# Patient Record
Sex: FEMALE | Race: WHITE | NOT HISPANIC OR LATINO | Employment: OTHER | URBAN - METROPOLITAN AREA
[De-identification: names, ages, dates, MRNs, and addresses within clinical notes are randomized per-mention and may not be internally consistent; named-entity substitution may affect disease eponyms.]

---

## 2017-03-22 ENCOUNTER — OFFICE VISIT (OUTPATIENT)
Dept: ORTHOPEDIC SURGERY | Facility: CLINIC | Age: 57
End: 2017-03-22

## 2017-03-22 DIAGNOSIS — M22.2X1 PATELLOFEMORAL STRESS SYNDROME OF RIGHT KNEE: ICD-10-CM

## 2017-03-22 DIAGNOSIS — M17.11 PRIMARY OSTEOARTHRITIS OF RIGHT KNEE: Primary | ICD-10-CM

## 2017-03-22 PROCEDURE — 20610 DRAIN/INJ JOINT/BURSA W/O US: CPT | Performed by: ORTHOPAEDIC SURGERY

## 2017-03-22 RX ORDER — MELOXICAM 7.5 MG/1
7.5 TABLET ORAL DAILY
Qty: 30 TABLET | Refills: 0 | Status: SHIPPED | OUTPATIENT
Start: 2017-03-22 | End: 2017-06-20 | Stop reason: SDUPTHER

## 2017-03-22 RX ADMIN — LIDOCAINE HYDROCHLORIDE 4 ML: 10 INJECTION, SOLUTION INFILTRATION; PERINEURAL at 15:26

## 2017-03-22 RX ADMIN — TRIAMCINOLONE ACETONIDE 80 MG: 40 INJECTION, SUSPENSION INTRA-ARTICULAR; INTRAMUSCULAR at 15:26

## 2017-03-22 RX ADMIN — BUPIVACAINE HYDROCHLORIDE 4 ML: 5 INJECTION, SOLUTION EPIDURAL; INTRACAUDAL at 15:26

## 2017-04-04 RX ORDER — BUPIVACAINE HYDROCHLORIDE 5 MG/ML
4 INJECTION, SOLUTION EPIDURAL; INTRACAUDAL
Status: COMPLETED | OUTPATIENT
Start: 2017-03-22 | End: 2017-03-22

## 2017-04-04 RX ORDER — LIDOCAINE HYDROCHLORIDE 10 MG/ML
4 INJECTION, SOLUTION INFILTRATION; PERINEURAL
Status: COMPLETED | OUTPATIENT
Start: 2017-03-22 | End: 2017-03-22

## 2017-04-04 RX ORDER — TRIAMCINOLONE ACETONIDE 40 MG/ML
80 INJECTION, SUSPENSION INTRA-ARTICULAR; INTRAMUSCULAR
Status: COMPLETED | OUTPATIENT
Start: 2017-03-22 | End: 2017-03-22

## 2017-04-19 ENCOUNTER — CLINICAL SUPPORT (OUTPATIENT)
Dept: ORTHOPEDIC SURGERY | Facility: CLINIC | Age: 57
End: 2017-04-19

## 2017-04-19 DIAGNOSIS — M17.11 PRIMARY OSTEOARTHRITIS OF RIGHT KNEE: Primary | ICD-10-CM

## 2017-04-19 PROCEDURE — 20610 DRAIN/INJ JOINT/BURSA W/O US: CPT | Performed by: ORTHOPAEDIC SURGERY

## 2017-04-19 NOTE — PROGRESS NOTES
Procedure   Large Joint Arthrocentesis  Date/Time: 4/19/2017 3:21 PM  Consent given by: patient  Site marked: site marked  Timeout: Immediately prior to procedure a time out was called to verify the correct patient, procedure, equipment, support staff and site/side marked as required   Supporting Documentation  Indications: pain   Procedure Details  Location: knee - R knee  Preparation: Patient was prepped and draped in the usual sterile fashion  Needle size: 22 G  Approach: superior  Medications administered: 30 mg Hyaluronan 30 MG/2ML  Patient tolerance: patient tolerated the procedure well with no immediate complications            Patient presents to clinic today for right knee viscosupplement injections.  This is the 1st injection of the series.  I explained details of injections as well as risks, benefits and alternatives with the patient today, had all questions answered, wished to proceed with injections.  I will see patient back in 1 week for repeat injection.  Patient was instructed to watch for signs or symptoms of infection including redness, swelling, warmth to the touch, or significant increased pain and to contact our office immediately if any of these issues were noted.

## 2017-04-26 ENCOUNTER — CLINICAL SUPPORT (OUTPATIENT)
Dept: ORTHOPEDIC SURGERY | Facility: CLINIC | Age: 57
End: 2017-04-26

## 2017-04-26 DIAGNOSIS — M17.11 PRIMARY OSTEOARTHRITIS OF RIGHT KNEE: Primary | ICD-10-CM

## 2017-04-26 DIAGNOSIS — M22.2X1 PATELLOFEMORAL STRESS SYNDROME OF RIGHT KNEE: ICD-10-CM

## 2017-04-26 PROCEDURE — 20610 DRAIN/INJ JOINT/BURSA W/O US: CPT | Performed by: ORTHOPAEDIC SURGERY

## 2017-04-26 NOTE — PROGRESS NOTES
Procedure   Large Joint Arthrocentesis  Date/Time: 4/26/2017 12:50 PM  Consent given by: patient  Site marked: site marked  Timeout: Immediately prior to procedure a time out was called to verify the correct patient, procedure, equipment, support staff and site/side marked as required   Supporting Documentation  Indications: pain   Procedure Details  Location: knee - R knee  Preparation: Patient was prepped and draped in the usual sterile fashion  Needle size: 22 G  Approach: superior  Medications administered: 30 mg Hyaluronan 30 MG/2ML  Patient tolerance: patient tolerated the procedure well with no immediate complications            Patient presents to clinic today for right knee viscosupplement injections.  This is the 2nd injection of the series.  I explained details of injections as well as risks, benefits and alternatives with the patient today, had all questions answered, wished to proceed with injections.  I will see patient back in 1 week for repeat injection.  Patient was instructed to watch for signs or symptoms of infection including redness, swelling, warmth to the touch, or significant increased pain and to contact our office immediately if any of these issues were noted.

## 2017-05-03 ENCOUNTER — CLINICAL SUPPORT (OUTPATIENT)
Dept: ORTHOPEDIC SURGERY | Facility: CLINIC | Age: 57
End: 2017-05-03

## 2017-05-03 DIAGNOSIS — M17.11 PRIMARY OSTEOARTHRITIS OF RIGHT KNEE: Primary | ICD-10-CM

## 2017-05-03 PROCEDURE — 20610 DRAIN/INJ JOINT/BURSA W/O US: CPT | Performed by: ORTHOPAEDIC SURGERY

## 2017-05-30 ENCOUNTER — APPOINTMENT (OUTPATIENT)
Dept: WOMENS IMAGING | Facility: HOSPITAL | Age: 57
End: 2017-05-30

## 2017-05-30 PROCEDURE — 77067 SCR MAMMO BI INCL CAD: CPT | Performed by: RADIOLOGY

## 2017-05-30 PROCEDURE — 77063 BREAST TOMOSYNTHESIS BI: CPT | Performed by: RADIOLOGY

## 2017-06-20 ENCOUNTER — OFFICE VISIT (OUTPATIENT)
Dept: ORTHOPEDIC SURGERY | Facility: CLINIC | Age: 57
End: 2017-06-20

## 2017-06-20 DIAGNOSIS — M17.11 PRIMARY OSTEOARTHRITIS OF RIGHT KNEE: Primary | ICD-10-CM

## 2017-06-20 PROCEDURE — 20610 DRAIN/INJ JOINT/BURSA W/O US: CPT | Performed by: ORTHOPAEDIC SURGERY

## 2017-06-20 RX ORDER — MELOXICAM 7.5 MG/1
7.5 TABLET ORAL DAILY
Qty: 30 TABLET | Refills: 0 | Status: SHIPPED | OUTPATIENT
Start: 2017-06-20 | End: 2018-03-29

## 2017-06-20 RX ADMIN — TRIAMCINOLONE ACETONIDE 80 MG: 40 INJECTION, SUSPENSION INTRA-ARTICULAR; INTRAMUSCULAR at 15:12

## 2017-06-20 RX ADMIN — LIDOCAINE HYDROCHLORIDE 4 ML: 10 INJECTION, SOLUTION EPIDURAL; INFILTRATION; INTRACAUDAL; PERINEURAL at 15:12

## 2017-06-20 RX ADMIN — BUPIVACAINE HYDROCHLORIDE 4 ML: 5 INJECTION, SOLUTION EPIDURAL; INTRACAUDAL at 15:12

## 2017-07-09 RX ORDER — TRIAMCINOLONE ACETONIDE 40 MG/ML
80 INJECTION, SUSPENSION INTRA-ARTICULAR; INTRAMUSCULAR
Status: COMPLETED | OUTPATIENT
Start: 2017-06-20 | End: 2017-06-20

## 2017-07-09 RX ORDER — LIDOCAINE HYDROCHLORIDE 10 MG/ML
4 INJECTION, SOLUTION EPIDURAL; INFILTRATION; INTRACAUDAL; PERINEURAL
Status: COMPLETED | OUTPATIENT
Start: 2017-06-20 | End: 2017-06-20

## 2017-07-09 RX ORDER — BUPIVACAINE HYDROCHLORIDE 5 MG/ML
4 INJECTION, SOLUTION EPIDURAL; INTRACAUDAL
Status: COMPLETED | OUTPATIENT
Start: 2017-06-20 | End: 2017-06-20

## 2017-08-29 ENCOUNTER — OFFICE VISIT (OUTPATIENT)
Dept: ORTHOPEDIC SURGERY | Facility: CLINIC | Age: 57
End: 2017-08-29

## 2017-08-29 DIAGNOSIS — M17.11 PRIMARY OSTEOARTHRITIS OF RIGHT KNEE: Primary | ICD-10-CM

## 2017-08-29 PROCEDURE — 20610 DRAIN/INJ JOINT/BURSA W/O US: CPT | Performed by: ORTHOPAEDIC SURGERY

## 2017-08-29 PROCEDURE — 99213 OFFICE O/P EST LOW 20 MIN: CPT | Performed by: ORTHOPAEDIC SURGERY

## 2017-08-29 RX ADMIN — TRIAMCINOLONE ACETONIDE 80 MG: 40 INJECTION, SUSPENSION INTRA-ARTICULAR; INTRAMUSCULAR at 12:06

## 2017-08-29 RX ADMIN — LIDOCAINE HYDROCHLORIDE 4 ML: 10 INJECTION, SOLUTION EPIDURAL; INFILTRATION; INTRACAUDAL; PERINEURAL at 12:06

## 2017-08-29 RX ADMIN — BUPIVACAINE HYDROCHLORIDE 4 ML: 5 INJECTION, SOLUTION EPIDURAL; INTRACAUDAL at 12:06

## 2017-09-07 RX ORDER — BUPIVACAINE HYDROCHLORIDE 5 MG/ML
4 INJECTION, SOLUTION EPIDURAL; INTRACAUDAL
Status: COMPLETED | OUTPATIENT
Start: 2017-08-29 | End: 2017-08-29

## 2017-09-07 RX ORDER — LIDOCAINE HYDROCHLORIDE 10 MG/ML
4 INJECTION, SOLUTION EPIDURAL; INFILTRATION; INTRACAUDAL; PERINEURAL
Status: COMPLETED | OUTPATIENT
Start: 2017-08-29 | End: 2017-08-29

## 2017-09-07 RX ORDER — TRIAMCINOLONE ACETONIDE 40 MG/ML
80 INJECTION, SUSPENSION INTRA-ARTICULAR; INTRAMUSCULAR
Status: COMPLETED | OUTPATIENT
Start: 2017-08-29 | End: 2017-08-29

## 2017-09-22 ENCOUNTER — TELEPHONE (OUTPATIENT)
Dept: GASTROENTEROLOGY | Facility: CLINIC | Age: 57
End: 2017-09-22

## 2017-09-22 RX ORDER — ONDANSETRON 4 MG/1
4 TABLET, ORALLY DISINTEGRATING ORAL EVERY 8 HOURS PRN
Qty: 25 TABLET | Refills: 3 | Status: SHIPPED | OUTPATIENT
Start: 2017-09-22 | End: 2019-01-03

## 2017-09-22 NOTE — TELEPHONE ENCOUNTER
"Called pt and pt states she has been doing well ,but does use the zofran 2-3x per year.  She is asking if she can have a refill of it to have on hand \"in case her stomach acts up\".  Advised would send message to Dr Arias.  Pt verb understanding.   "

## 2017-09-22 NOTE — TELEPHONE ENCOUNTER
----- Message from Trish Antonio sent at 2017  8:29 AM EDT -----  Regarding: PT CALLED - REFILL ON ZOFRAN   Contact: [phone]  PT REQUESTING REFILL.

## 2018-01-16 ENCOUNTER — OFFICE VISIT (OUTPATIENT)
Dept: ORTHOPEDIC SURGERY | Facility: CLINIC | Age: 58
End: 2018-01-16

## 2018-01-16 VITALS — HEIGHT: 67 IN | WEIGHT: 130 LBS | BODY MASS INDEX: 20.4 KG/M2

## 2018-01-16 DIAGNOSIS — M17.11 PRIMARY OSTEOARTHRITIS OF RIGHT KNEE: ICD-10-CM

## 2018-01-16 DIAGNOSIS — R52 PAIN: Primary | ICD-10-CM

## 2018-01-16 PROCEDURE — 20610 DRAIN/INJ JOINT/BURSA W/O US: CPT | Performed by: ORTHOPAEDIC SURGERY

## 2018-01-16 PROCEDURE — 99213 OFFICE O/P EST LOW 20 MIN: CPT | Performed by: ORTHOPAEDIC SURGERY

## 2018-01-16 PROCEDURE — 73562 X-RAY EXAM OF KNEE 3: CPT | Performed by: ORTHOPAEDIC SURGERY

## 2018-01-16 RX ADMIN — BUPIVACAINE HYDROCHLORIDE 4 ML: 5 INJECTION, SOLUTION EPIDURAL; INTRACAUDAL at 15:54

## 2018-01-16 RX ADMIN — LIDOCAINE HYDROCHLORIDE 4 ML: 10 INJECTION, SOLUTION EPIDURAL; INFILTRATION; INTRACAUDAL; PERINEURAL at 15:54

## 2018-01-16 RX ADMIN — TRIAMCINOLONE ACETONIDE 80 MG: 40 INJECTION, SUSPENSION INTRA-ARTICULAR; INTRAMUSCULAR at 15:54

## 2018-01-16 NOTE — PROGRESS NOTES
Subjective:     Patient ID: Zoila Delgado is a 57 y.o. female.    Chief Complaint:  Follow-up right knee pain, DJD  History of Present Illness  Zoila Delgado returns to clinic today for evaluation of right knee, knee had done very well for approximately 2-1/2-3 months, but over the last 2 weeks or so she's had increasing pain as well as significant swelling to the right knee that limits her flexion and mobility.  Pain localized primarily to the anterior and medial aspect of the right knee, denies any associated groin pain, denies radiation of pain.  Rates pain currently as a 6-7 out of 10 in aching in nature with occasional sharp pain noted.  Denies any edil locking of the knee.     Social History     Occupational History   • Not on file.     Social History Main Topics   • Smoking status: Never Smoker   • Smokeless tobacco: Never Used   • Alcohol use No   • Drug use: No   • Sexual activity: Defer      Past Medical History:   Diagnosis Date   • Ankle sprain    • Cervical disc disorder    • Diverticular disease    • Diverticulitis    • Dyspepsia    • Gastritis    • GERD (gastroesophageal reflux disease)    • HH (hiatus hernia)    • Hyperlipidemia    • Renal cyst     left   • Rotator cuff syndrome      Past Surgical History:   Procedure Laterality Date   • COLONOSCOPY  11/30/2015    tics,nibh   • ENDOMETRIAL ABLATION     • ENDOSCOPY  11/30/2015    HH, Z-line irregular, 42 cm. fromincisors, chronic inflammation   • TONSILECTOMY, ADENOIDECTOMY, BILATERAL MYRINGOTOMY AND TUBES         No family history on file.      Review of Systems   Constitutional: Negative for chills, diaphoresis, fever and unexpected weight change.   HENT: Negative for hearing loss, nosebleeds, sore throat and tinnitus.    Eyes: Negative for pain and visual disturbance.   Respiratory: Negative for cough, shortness of breath and wheezing.    Cardiovascular: Negative for chest pain and palpitations.   Gastrointestinal: Negative for abdominal pain,  "diarrhea, nausea and vomiting.   Endocrine: Negative for cold intolerance, heat intolerance and polydipsia.   Genitourinary: Negative for difficulty urinating, dysuria and hematuria.   Musculoskeletal: Positive for arthralgias and myalgias. Negative for joint swelling.        Rt knee pain   Skin: Negative for rash and wound.   Allergic/Immunologic: Negative for environmental allergies.   Neurological: Negative for dizziness, syncope and numbness.   Hematological: Does not bruise/bleed easily.   Psychiatric/Behavioral: Negative for dysphoric mood and sleep disturbance. The patient is not nervous/anxious.    All other systems reviewed and are negative.          Objective:  Vitals:    01/16/18 1517   Weight: 59 kg (130 lb)   Height: 170.2 cm (67\")     Last 2 weights    01/16/18  1517   Weight: 59 kg (130 lb)     Body mass index is 20.36 kg/(m^2).  General: No acute distress.  Resp: normal respiratory effort  Skin: no rashes or wounds; normal turgor  Psych: mood and affect appropriate; recent and remote memory intact         Ortho Exam    Right knee-1+ effusion noted, active range of motion 2-135°, 4+ out of 5 strength on flexion and extension, maximal tenderness palpation along medial joint line as well as medial and lateral patellar facet with positive active patellar compression test.  Mildly positive Lizz exam with pain along medial joint line, no click.  Stable to varus and valgus stress at 2 and 30°.  No right hip pain on logroll or Stinchfield exam.    Imaging:  Right Knee X-Ray  Indication: Pain    AP, Lateral, and [place] views    Findings:  No fracture  No bony lesion  Normal soft tissues  Moderate medial and patellofemoral compartment joint space narrowing with no evidence radiopaque intra-articular loose body    Compared to prior office x-rays.    Assessment:       1. Pain    2. Primary osteoarthritis of right knee          Plan:  Large Joint Arthrocentesis  Date/Time: 1/16/2018 3:54 PM  Consent given " by: patient  Site marked: site marked  Timeout: Immediately prior to procedure a time out was called to verify the correct patient, procedure, equipment, support staff and site/side marked as required   Supporting Documentation  Indications: pain   Procedure Details  Location: knee - R knee  Preparation: Patient was prepped and draped in the usual sterile fashion  Needle size: 22 G  Approach: superior  Medications administered: 4 mL lidocaine PF 1% 1 %; 4 mL bupivacaine (PF) 0.5 %; 80 mg triamcinolone acetonide 40 MG/ML  Patient tolerance: patient tolerated the procedure well with no immediate complications          RICHMOND query complete.          Discussed treatment options at length with patient at today's visit. Patient overall has done fairly well with activity modification, continue work on hip and core strengthening, and previous injections.  She does have significant recurrence swelling as well as pain at this point in time.  After reviewing options she elected today to proceed with intra-articular injection.    Zoila Delgado was in agreement with plan and had all questions answered.     Orders:  Orders Placed This Encounter   Procedures   • Large Joint Arthrocentesis   • XR Knee 3 View Right       Medications:  No orders of the defined types were placed in this encounter.      Followup:  No Follow-up on file.    Zoila was seen today for follow-up.    Diagnoses and all orders for this visit:    Pain  -     XR Knee 3 View Right    Primary osteoarthritis of right knee    Other orders  -     Large Joint Arthrocentesis        Dictated utilizing Dragon dictation

## 2018-01-25 RX ORDER — LIDOCAINE HYDROCHLORIDE 10 MG/ML
4 INJECTION, SOLUTION EPIDURAL; INFILTRATION; INTRACAUDAL; PERINEURAL
Status: COMPLETED | OUTPATIENT
Start: 2018-01-16 | End: 2018-01-16

## 2018-01-25 RX ORDER — BUPIVACAINE HYDROCHLORIDE 5 MG/ML
4 INJECTION, SOLUTION EPIDURAL; INTRACAUDAL
Status: COMPLETED | OUTPATIENT
Start: 2018-01-16 | End: 2018-01-16

## 2018-01-25 RX ORDER — TRIAMCINOLONE ACETONIDE 40 MG/ML
80 INJECTION, SUSPENSION INTRA-ARTICULAR; INTRAMUSCULAR
Status: COMPLETED | OUTPATIENT
Start: 2018-01-16 | End: 2018-01-16

## 2018-03-29 ENCOUNTER — OFFICE VISIT (OUTPATIENT)
Dept: ORTHOPEDIC SURGERY | Facility: CLINIC | Age: 58
End: 2018-03-29

## 2018-03-29 DIAGNOSIS — M17.11 PRIMARY OSTEOARTHRITIS OF RIGHT KNEE: Primary | ICD-10-CM

## 2018-03-29 PROCEDURE — 99213 OFFICE O/P EST LOW 20 MIN: CPT | Performed by: ORTHOPAEDIC SURGERY

## 2018-03-29 PROCEDURE — 20610 DRAIN/INJ JOINT/BURSA W/O US: CPT | Performed by: ORTHOPAEDIC SURGERY

## 2018-03-29 RX ADMIN — LIDOCAINE HYDROCHLORIDE 8 ML: 10 INJECTION, SOLUTION EPIDURAL; INFILTRATION; INTRACAUDAL; PERINEURAL at 15:59

## 2018-03-29 RX ADMIN — TRIAMCINOLONE ACETONIDE 80 MG: 40 INJECTION, SUSPENSION INTRA-ARTICULAR; INTRAMUSCULAR at 15:59

## 2018-04-16 RX ORDER — TRIAMCINOLONE ACETONIDE 40 MG/ML
80 INJECTION, SUSPENSION INTRA-ARTICULAR; INTRAMUSCULAR
Status: COMPLETED | OUTPATIENT
Start: 2018-03-29 | End: 2018-03-29

## 2018-04-16 RX ORDER — LIDOCAINE HYDROCHLORIDE 10 MG/ML
8 INJECTION, SOLUTION EPIDURAL; INFILTRATION; INTRACAUDAL; PERINEURAL
Status: COMPLETED | OUTPATIENT
Start: 2018-03-29 | End: 2018-03-29

## 2018-06-20 ENCOUNTER — APPOINTMENT (OUTPATIENT)
Dept: WOMENS IMAGING | Facility: HOSPITAL | Age: 58
End: 2018-06-20

## 2018-06-20 PROCEDURE — 77063 BREAST TOMOSYNTHESIS BI: CPT | Performed by: RADIOLOGY

## 2018-06-20 PROCEDURE — 77067 SCR MAMMO BI INCL CAD: CPT | Performed by: RADIOLOGY

## 2018-06-28 ENCOUNTER — OFFICE VISIT (OUTPATIENT)
Dept: ORTHOPEDIC SURGERY | Facility: CLINIC | Age: 58
End: 2018-06-28

## 2018-06-28 DIAGNOSIS — M17.11 PRIMARY OSTEOARTHRITIS OF RIGHT KNEE: Primary | ICD-10-CM

## 2018-06-28 PROCEDURE — 99213 OFFICE O/P EST LOW 20 MIN: CPT | Performed by: ORTHOPAEDIC SURGERY

## 2018-06-28 PROCEDURE — 20610 DRAIN/INJ JOINT/BURSA W/O US: CPT | Performed by: ORTHOPAEDIC SURGERY

## 2018-06-28 RX ADMIN — TRIAMCINOLONE ACETONIDE 80 MG: 40 INJECTION, SUSPENSION INTRA-ARTICULAR; INTRAMUSCULAR at 15:53

## 2018-06-28 RX ADMIN — LIDOCAINE HYDROCHLORIDE 8 ML: 10 INJECTION, SOLUTION EPIDURAL; INFILTRATION; INTRACAUDAL; PERINEURAL at 15:53

## 2018-07-02 RX ORDER — TRIAMCINOLONE ACETONIDE 40 MG/ML
80 INJECTION, SUSPENSION INTRA-ARTICULAR; INTRAMUSCULAR
Status: COMPLETED | OUTPATIENT
Start: 2018-06-28 | End: 2018-06-28

## 2018-07-02 RX ORDER — LIDOCAINE HYDROCHLORIDE 10 MG/ML
8 INJECTION, SOLUTION EPIDURAL; INFILTRATION; INTRACAUDAL; PERINEURAL
Status: COMPLETED | OUTPATIENT
Start: 2018-06-28 | End: 2018-06-28

## 2019-01-03 ENCOUNTER — OFFICE VISIT (OUTPATIENT)
Dept: ORTHOPEDIC SURGERY | Facility: CLINIC | Age: 59
End: 2019-01-03

## 2019-01-03 DIAGNOSIS — M17.11 PRIMARY OSTEOARTHRITIS OF RIGHT KNEE: Primary | ICD-10-CM

## 2019-01-03 PROCEDURE — 99213 OFFICE O/P EST LOW 20 MIN: CPT | Performed by: ORTHOPAEDIC SURGERY

## 2019-01-03 PROCEDURE — 20610 DRAIN/INJ JOINT/BURSA W/O US: CPT | Performed by: ORTHOPAEDIC SURGERY

## 2019-01-03 RX ORDER — TRIAMCINOLONE ACETONIDE 40 MG/ML
80 INJECTION, SUSPENSION INTRA-ARTICULAR; INTRAMUSCULAR
Status: COMPLETED | OUTPATIENT
Start: 2019-01-03 | End: 2019-01-03

## 2019-01-03 RX ORDER — LIDOCAINE HYDROCHLORIDE 20 MG/ML
8 INJECTION, SOLUTION EPIDURAL; INFILTRATION; INTRACAUDAL; PERINEURAL
Status: COMPLETED | OUTPATIENT
Start: 2019-01-03 | End: 2019-01-03

## 2019-01-03 RX ADMIN — TRIAMCINOLONE ACETONIDE 80 MG: 40 INJECTION, SUSPENSION INTRA-ARTICULAR; INTRAMUSCULAR at 13:42

## 2019-01-03 RX ADMIN — LIDOCAINE HYDROCHLORIDE 8 ML: 20 INJECTION, SOLUTION EPIDURAL; INFILTRATION; INTRACAUDAL; PERINEURAL at 13:42

## 2019-01-03 NOTE — PROGRESS NOTES
Subjective:     Patient ID: Zoila Delgado is a 58 y.o. female.    Chief Complaint:  Follow-up right knee pain, DJD  History of Present Illness  Zoila Delgado returns to clinic today for evaluation of right knee, states overall she did very well following her injection 6 months ago, over the last 4 weeks she has no sick gradual increase in pain over the anterior and medial aspect of her right knee with moderate tenderness along lateral joint line noted as well, denies a edil locking or catching, rates current level of pain as a 6 out of 10 in aching in nature.  Mild intermittent swelling does wax and wane, denies any radiation of pain, denies associated numbness or tingling.     Social History     Occupational History   • Not on file   Tobacco Use   • Smoking status: Never Smoker   • Smokeless tobacco: Never Used   Substance and Sexual Activity   • Alcohol use: No   • Drug use: No   • Sexual activity: Defer      Past Medical History:   Diagnosis Date   • Ankle sprain    • Cervical disc disorder    • Diverticular disease    • Diverticulitis    • Dyspepsia    • Gastritis    • GERD (gastroesophageal reflux disease)    • HH (hiatus hernia)    • Hyperlipidemia    • Renal cyst     left   • Rotator cuff syndrome      Past Surgical History:   Procedure Laterality Date   • COLONOSCOPY  11/30/2015    balbina mallory   • ENDOMETRIAL ABLATION     • ENDOSCOPY  11/30/2015    HH, Z-line irregular, 42 cm. fromincisors, chronic inflammation   • TONSILECTOMY, ADENOIDECTOMY, BILATERAL MYRINGOTOMY AND TUBES         Family History   Problem Relation Age of Onset   • No Known Problems Mother    • No Known Problems Father          Review of Systems   Constitutional: Negative for chills, diaphoresis, fever and unexpected weight change.   HENT: Negative for hearing loss, nosebleeds, sore throat and tinnitus.    Eyes: Negative for pain and visual disturbance.   Respiratory: Negative for cough, shortness of breath and wheezing.    Cardiovascular:  Negative for chest pain and palpitations.   Gastrointestinal: Negative for abdominal pain, diarrhea, nausea and vomiting.   Endocrine: Negative for cold intolerance, heat intolerance and polydipsia.   Genitourinary: Negative for difficulty urinating, dysuria and hematuria.   Musculoskeletal: Positive for arthralgias. Negative for joint swelling and myalgias.   Skin: Negative for rash and wound.   Allergic/Immunologic: Negative for environmental allergies.   Neurological: Negative for dizziness, syncope and numbness.   Hematological: Does not bruise/bleed easily.   Psychiatric/Behavioral: Negative for dysphoric mood and sleep disturbance. The patient is not nervous/anxious.            Objective:  There were no vitals filed for this visit.  There were no vitals filed for this visit.  There is no height or weight on file to calculate BMI.  General: No acute distress.  Resp: normal respiratory effort  Skin: no rashes or wounds; normal turgor  Psych: mood and affect appropriate; recent and remote memory intact          Ortho Exam     Right knee-active range of motion 0-135°, 4+ out of 5 strength on flexion and extension, mild effusion noted, maximal tenderness along medial and lateral patellar facet with moderate tenderness along lateral joint line, positive Morales exam with pain, no click, positive active patellar compression test.  Stable to varus and valgus stress at 0 and 30°.  Imaging:    Assessment:        1. Primary osteoarthritis of right knee           Plan:  Large Joint Arthrocentesis: R knee  Date/Time: 1/3/2019 1:42 PM  Consent given by: patient  Site marked: site marked  Timeout: Immediately prior to procedure a time out was called to verify the correct patient, procedure, equipment, support staff and site/side marked as required   Supporting Documentation  Indications: pain   Procedure Details  Location: knee - R knee  Preparation: Patient was prepped and draped in the usual sterile fashion  Needle size: 22  G  Approach: anterolateral  Medications administered: 8 mL lidocaine PF 2% 2 %; 80 mg triamcinolone acetonide 40 MG/ML  Patient tolerance: patient tolerated the procedure well with no immediate complications                Discussed treatment options at length with patient at today's visit.  Given significant recurrence of pain patient was to proceed with repeat injection today.    10 minutes out of 15 minutes face-to-face time was spent counseling patient extensively on exercise, opportunities for physical therapy and bracing, options in regards to underlying pathology including but not limited to surgical options including arthroplasty as well as injections.       Zoila Delgado was in agreement with plan and had all questions answered.     Orders:  Orders Placed This Encounter   Procedures   • Large Joint Arthrocentesis: R knee       Medications:  No orders of the defined types were placed in this encounter.      Followup:  Return if symptoms worsen or fail to improve.    Zoila was seen today for pain.    Diagnoses and all orders for this visit:    Primary osteoarthritis of right knee    Other orders  -     Large Joint Arthrocentesis: R knee          Dictated utilizing Dragon dictation

## 2019-02-08 ENCOUNTER — PREP FOR SURGERY (OUTPATIENT)
Dept: OTHER | Facility: HOSPITAL | Age: 59
End: 2019-02-08

## 2019-02-08 DIAGNOSIS — K22.70 BARRETT'S ESOPHAGUS WITHOUT DYSPLASIA: Primary | ICD-10-CM

## 2019-05-07 ENCOUNTER — ANESTHESIA (OUTPATIENT)
Dept: GASTROENTEROLOGY | Facility: HOSPITAL | Age: 59
End: 2019-05-07

## 2019-05-07 ENCOUNTER — ANESTHESIA EVENT (OUTPATIENT)
Dept: GASTROENTEROLOGY | Facility: HOSPITAL | Age: 59
End: 2019-05-07

## 2019-05-07 ENCOUNTER — HOSPITAL ENCOUNTER (OUTPATIENT)
Facility: HOSPITAL | Age: 59
Setting detail: HOSPITAL OUTPATIENT SURGERY
Discharge: HOME | End: 2019-05-07
Attending: INTERNAL MEDICINE | Admitting: INTERNAL MEDICINE

## 2019-05-07 VITALS
WEIGHT: 132.5 LBS | HEIGHT: 66 IN | TEMPERATURE: 98 F | DIASTOLIC BLOOD PRESSURE: 75 MMHG | RESPIRATION RATE: 16 BRPM | OXYGEN SATURATION: 98 % | BODY MASS INDEX: 21.29 KG/M2 | SYSTOLIC BLOOD PRESSURE: 105 MMHG | HEART RATE: 61 BPM

## 2019-05-07 DIAGNOSIS — K22.70 BARRETT'S ESOPHAGUS WITHOUT DYSPLASIA: ICD-10-CM

## 2019-05-07 PROCEDURE — 43239 EGD BIOPSY SINGLE/MULTIPLE: CPT | Performed by: INTERNAL MEDICINE

## 2019-05-07 PROCEDURE — 25010000002 PROPOFOL 10 MG/ML EMULSION: Performed by: ANESTHESIOLOGY

## 2019-05-07 PROCEDURE — S0260 H&P FOR SURGERY: HCPCS | Performed by: INTERNAL MEDICINE

## 2019-05-07 PROCEDURE — 88305 TISSUE EXAM BY PATHOLOGIST: CPT | Performed by: INTERNAL MEDICINE

## 2019-05-07 RX ORDER — LIDOCAINE HYDROCHLORIDE 20 MG/ML
INJECTION, SOLUTION INFILTRATION; PERINEURAL AS NEEDED
Status: DISCONTINUED | OUTPATIENT
Start: 2019-05-07 | End: 2019-05-07 | Stop reason: SURG

## 2019-05-07 RX ORDER — PROMETHAZINE HYDROCHLORIDE 25 MG/ML
12.5 INJECTION, SOLUTION INTRAMUSCULAR; INTRAVENOUS ONCE AS NEEDED
Status: DISCONTINUED | OUTPATIENT
Start: 2019-05-07 | End: 2019-05-07 | Stop reason: HOSPADM

## 2019-05-07 RX ORDER — SODIUM CHLORIDE 0.9 % (FLUSH) 0.9 %
3 SYRINGE (ML) INJECTION AS NEEDED
Status: DISCONTINUED | OUTPATIENT
Start: 2019-05-07 | End: 2019-05-07 | Stop reason: HOSPADM

## 2019-05-07 RX ORDER — PROPOFOL 10 MG/ML
VIAL (ML) INTRAVENOUS CONTINUOUS PRN
Status: DISCONTINUED | OUTPATIENT
Start: 2019-05-07 | End: 2019-05-07 | Stop reason: SURG

## 2019-05-07 RX ORDER — SODIUM CHLORIDE, SODIUM LACTATE, POTASSIUM CHLORIDE, CALCIUM CHLORIDE 600; 310; 30; 20 MG/100ML; MG/100ML; MG/100ML; MG/100ML
1000 INJECTION, SOLUTION INTRAVENOUS CONTINUOUS
Status: DISCONTINUED | OUTPATIENT
Start: 2019-05-07 | End: 2019-05-07 | Stop reason: HOSPADM

## 2019-05-07 RX ORDER — PROMETHAZINE HYDROCHLORIDE 25 MG/1
25 TABLET ORAL ONCE AS NEEDED
Status: DISCONTINUED | OUTPATIENT
Start: 2019-05-07 | End: 2019-05-07 | Stop reason: HOSPADM

## 2019-05-07 RX ORDER — PROPOFOL 10 MG/ML
VIAL (ML) INTRAVENOUS AS NEEDED
Status: DISCONTINUED | OUTPATIENT
Start: 2019-05-07 | End: 2019-05-07 | Stop reason: SURG

## 2019-05-07 RX ORDER — PROMETHAZINE HYDROCHLORIDE 25 MG/1
25 SUPPOSITORY RECTAL ONCE AS NEEDED
Status: DISCONTINUED | OUTPATIENT
Start: 2019-05-07 | End: 2019-05-07 | Stop reason: HOSPADM

## 2019-05-07 RX ORDER — ASCORBIC ACID 500 MG
500 TABLET ORAL 2 TIMES DAILY
COMMUNITY

## 2019-05-07 RX ADMIN — SODIUM CHLORIDE, POTASSIUM CHLORIDE, SODIUM LACTATE AND CALCIUM CHLORIDE 1000 ML: 600; 310; 30; 20 INJECTION, SOLUTION INTRAVENOUS at 07:41

## 2019-05-07 RX ADMIN — PROPOFOL 130 MG: 10 INJECTION, EMULSION INTRAVENOUS at 08:05

## 2019-05-07 RX ADMIN — PROPOFOL 200 MCG/KG/MIN: 10 INJECTION, EMULSION INTRAVENOUS at 08:05

## 2019-05-07 RX ADMIN — LIDOCAINE HYDROCHLORIDE 70 MG: 20 INJECTION, SOLUTION INFILTRATION; PERINEURAL at 08:04

## 2019-05-07 NOTE — ANESTHESIA POSTPROCEDURE EVALUATION
"Patient: Zoila Delgado    Procedure Summary     Date:  05/07/19 Room / Location:  [facility] ENDOSCOPY 10 /  [facility]    Anesthesia Start:  0802 Anesthesia Stop:  0814    Procedure:  ESOPHAGOGASTRODUODENOSCOPY WITH COLD BIOPSIES (N/A Esophagus) Diagnosis:       Monsivais's esophagus without dysplasia      (Monsivais's esophagus without dysplasia [K22.70])    Surgeon:  Archnaa Arias MD Provider:  Thomas Martin MD    Anesthesia Type:  MAC ASA Status:  2          Anesthesia Type: MAC  Last vitals  BP   104/71 (05/07/19 0826)   Temp   36.7 °C (98 °F) (05/07/19 0722)   Pulse   63 (05/07/19 0826)   Resp   16 (05/07/19 0826)     SpO2   99 % (05/07/19 0826)     Post Anesthesia Care and Evaluation    Patient location during evaluation: bedside  Patient participation: complete - patient participated  Level of consciousness: awake and alert  Pain management: adequate  Airway patency: patent  Anesthetic complications: No anesthetic complications  PONV Status: none  Cardiovascular status: acceptable  Respiratory status: acceptable  Hydration status: acceptable    Comments: /71 (BP Location: Left arm, Patient Position: Sitting)   Pulse 63   Temp 36.7 °C (98 °F) (Oral)   Resp 16   Ht 167.6 cm (66\")   Wt 60.1 kg (132 lb 8 oz)   SpO2 99%   BMI 21.39 kg/m²         "

## 2019-05-07 NOTE — H&P
[facility]  Pre Procedure History & Physical    Chief Complaint:   Gerd, foss's    Subjective     HPI:   58 yo with hx foss's esophagus on previous egd - last exam 2015.  gerd well controlled.    Past Medical History:   Past Medical History:   Diagnosis Date   • Ankle sprain    • Cervical disc disorder    • Diverticular disease    • Diverticulitis    • Dyspepsia    • Gastritis    • GERD (gastroesophageal reflux disease)    • HH (hiatus hernia)    • Hyperlipidemia    • Renal cyst     left   • Rotator cuff syndrome        Past Surgical History:  Past Surgical History:   Procedure Laterality Date   • COLONOSCOPY  11/30/2015    balbina mallory   • ENDOMETRIAL ABLATION     • ENDOSCOPY  11/30/2015    HH, Z-line irregular, 42 cm. fromincisors, chronic inflammation   • TONSILECTOMY, ADENOIDECTOMY, BILATERAL MYRINGOTOMY AND TUBES         Family History:  Family History   Problem Relation Age of Onset   • No Known Problems Mother    • No Known Problems Father        Social History:   reports that she has never smoked. She has never used smokeless tobacco. She reports that she does not drink alcohol or use drugs.    Medications:   Medications Prior to Admission   Medication Sig Dispense Refill Last Dose   • Calcium Carbonate (CALCIUM CHEW) 500 MG chewable tablet Chew 1 tablet every 6 (six) hours as needed.   5/6/2019 at Unknown time   • Inulin (FIBER CHOICE PO) Take  by mouth.   5/6/2019 at Unknown time   • Magnesium 100 MG tablet Take 1 tablet/day by mouth Daily.   5/6/2019 at Unknown time   • Multiple Vitamins-Calcium (VIACTIV MULTI-VITAMIN PO) Take  by mouth.   5/6/2019 at Unknown time   • Multiple Vitamins-Minerals (QC MULTI-MORENO PO) Take  by mouth.   5/6/2019 at Unknown time   • Omega-3 Fatty Acids (FISH OIL) 1000 MG capsule capsule Take  by mouth daily with breakfast.   5/6/2019 at Unknown time   • omeprazole (PriLOSEC) 20 MG capsule Take 20 mg by mouth daily.   5/6/2019 at Unknown time   •  "vitamin C (ASCORBIC ACID) 500 MG tablet Take 500 mg by mouth 2 (Two) Times a Day.   5/6/2019 at Unknown time       Allergies:  Patient has no known allergies.    ROS:    Pertinent items are noted in HPI, all other systems reviewed and negative     Objective     Blood pressure 105/74, pulse 64, temperature 98 °F (36.7 °C), temperature source Oral, resp. rate 17, height 167.6 cm (66\"), weight 60.1 kg (132 lb 8 oz), SpO2 99 %.    Physical Exam   Constitutional: Pt is oriented to person, place, and time and well-developed, well-nourished, and in no distress.   Mouth/Throat: Oropharynx is clear and moist.   Neck: Normal range of motion.   Cardiovascular: Normal rate, regular rhythm   Pulmonary/Chest: Effort normal   Abdominal: Soft. Nontender  Skin: Skin is warm and dry.   Psychiatric: Mood, memory, affect and judgment normal.     Assessment/Plan     Diagnosis:  Gerd, foss's esophagus    Anticipated Surgical Procedure:  egd with bx    The risks, benefits, and alternatives of this procedure have been discussed with the patient or the responsible party- the patient understands and agrees to proceed.                                                            "

## 2019-05-07 NOTE — ANESTHESIA PREPROCEDURE EVALUATION
Anesthesia Evaluation     Patient summary reviewed and Nursing notes reviewed   no history of anesthetic complications:  NPO Solid Status: > 8 hours  NPO Liquid Status: > 8 hours           Airway   Mallampati: II  TM distance: >3 FB  Neck ROM: full  No difficulty expected  Dental      Pulmonary    (-) sleep apnea, rhonchi, decreased breath sounds, wheezes, not a smoker  Cardiovascular   Exercise tolerance: good (4-7 METS)    Rhythm: regular  Rate: normal    (+) hyperlipidemia,   (-) hypertension, CAD, angina, SEGURA, murmur      Neuro/Psych  (-) seizures, CVA  GI/Hepatic/Renal/Endo    (+)  hiatal hernia, GERD,    (-)  obesity, no renal disease, diabetes    Musculoskeletal     Abdominal     Abdomen: soft.   Substance History      OB/GYN          Other   (+) arthritis                     Anesthesia Plan    ASA 2     MAC   total IV anesthesia  intravenous induction   Anesthetic plan, all risks, benefits, and alternatives have been provided, discussed and informed consent has been obtained with: patient.

## 2019-05-08 LAB
CYTO UR: NORMAL
LAB AP CASE REPORT: NORMAL
PATH REPORT.FINAL DX SPEC: NORMAL
PATH REPORT.GROSS SPEC: NORMAL

## 2019-05-10 ENCOUNTER — TELEPHONE (OUTPATIENT)
Dept: GASTROENTEROLOGY | Facility: CLINIC | Age: 59
End: 2019-05-10

## 2019-05-10 NOTE — TELEPHONE ENCOUNTER
Call to pt.  Advise per Dr Arias that mild chronic inflammation seen on bx.  No significant foss's change seen.  Repeat egd in 5 yrs.  Verb understanding.    EGD for 5/7/24 placed in recall.

## 2019-05-16 ENCOUNTER — CLINICAL SUPPORT (OUTPATIENT)
Dept: ORTHOPEDIC SURGERY | Facility: CLINIC | Age: 59
End: 2019-05-16

## 2019-05-16 DIAGNOSIS — M17.11 PRIMARY OSTEOARTHRITIS OF RIGHT KNEE: Primary | ICD-10-CM

## 2019-05-16 PROCEDURE — 20610 DRAIN/INJ JOINT/BURSA W/O US: CPT | Performed by: ORTHOPAEDIC SURGERY

## 2019-05-16 RX ORDER — TRIAMCINOLONE ACETONIDE 40 MG/ML
80 INJECTION, SUSPENSION INTRA-ARTICULAR; INTRAMUSCULAR
Status: COMPLETED | OUTPATIENT
Start: 2019-05-16 | End: 2019-05-16

## 2019-05-16 RX ORDER — LIDOCAINE HYDROCHLORIDE 10 MG/ML
8 INJECTION, SOLUTION EPIDURAL; INFILTRATION; INTRACAUDAL; PERINEURAL
Status: COMPLETED | OUTPATIENT
Start: 2019-05-16 | End: 2019-05-16

## 2019-05-16 RX ADMIN — LIDOCAINE HYDROCHLORIDE 8 ML: 10 INJECTION, SOLUTION EPIDURAL; INFILTRATION; INTRACAUDAL; PERINEURAL at 13:09

## 2019-05-16 RX ADMIN — TRIAMCINOLONE ACETONIDE 80 MG: 40 INJECTION, SUSPENSION INTRA-ARTICULAR; INTRAMUSCULAR at 13:09

## 2019-05-16 NOTE — PROGRESS NOTES
Large Joint Arthrocentesis: R knee  Date/Time: 5/16/2019 1:09 PM  Consent given by: patient  Site marked: site marked  Timeout: Immediately prior to procedure a time out was called to verify the correct patient, procedure, equipment, support staff and site/side marked as required   Supporting Documentation  Indications: pain   Procedure Details  Location: knee - R knee  Preparation: Patient was prepped and draped in the usual sterile fashion  Needle size: 22 G  Approach: superior  Medications administered: 8 mL lidocaine PF 1% 1 %; 80 mg triamcinolone acetonide 40 MG/ML  Patient tolerance: patient tolerated the procedure well with no immediate complications        CC: Right knee DJD    Patient presents today for intra-articular injection of the right knee.  She is tolerated these well, last was back in January.  She tolerated procedure well without difficulties.  Follow-up in 6 to 8 weeks with x-rays of right knee.  All questions answered

## 2019-06-25 ENCOUNTER — APPOINTMENT (OUTPATIENT)
Dept: WOMENS IMAGING | Facility: HOSPITAL | Age: 59
End: 2019-06-25

## 2019-06-25 PROCEDURE — 77063 BREAST TOMOSYNTHESIS BI: CPT | Performed by: RADIOLOGY

## 2019-06-25 PROCEDURE — 77067 SCR MAMMO BI INCL CAD: CPT | Performed by: RADIOLOGY

## 2019-06-27 ENCOUNTER — OFFICE VISIT (OUTPATIENT)
Dept: ORTHOPEDIC SURGERY | Facility: CLINIC | Age: 59
End: 2019-06-27

## 2019-06-27 VITALS — WEIGHT: 130 LBS | HEIGHT: 66 IN | BODY MASS INDEX: 20.89 KG/M2

## 2019-06-27 DIAGNOSIS — M17.11 PRIMARY OSTEOARTHRITIS OF RIGHT KNEE: ICD-10-CM

## 2019-06-27 DIAGNOSIS — R52 PAIN: Primary | ICD-10-CM

## 2019-06-27 DIAGNOSIS — M70.62 TROCHANTERIC BURSITIS OF LEFT HIP: ICD-10-CM

## 2019-06-27 PROCEDURE — 99214 OFFICE O/P EST MOD 30 MIN: CPT | Performed by: ORTHOPAEDIC SURGERY

## 2019-06-27 PROCEDURE — 20610 DRAIN/INJ JOINT/BURSA W/O US: CPT | Performed by: ORTHOPAEDIC SURGERY

## 2019-06-27 PROCEDURE — 73562 X-RAY EXAM OF KNEE 3: CPT | Performed by: ORTHOPAEDIC SURGERY

## 2019-06-27 RX ADMIN — TRIAMCINOLONE ACETONIDE 80 MG: 40 INJECTION, SUSPENSION INTRA-ARTICULAR; INTRAMUSCULAR at 08:59

## 2019-06-27 RX ADMIN — LIDOCAINE HYDROCHLORIDE 4 ML: 10 INJECTION, SOLUTION EPIDURAL; INFILTRATION; INTRACAUDAL; PERINEURAL at 08:59

## 2019-07-01 RX ORDER — LIDOCAINE HYDROCHLORIDE 10 MG/ML
4 INJECTION, SOLUTION EPIDURAL; INFILTRATION; INTRACAUDAL; PERINEURAL
Status: COMPLETED | OUTPATIENT
Start: 2019-06-27 | End: 2019-06-27

## 2019-07-01 RX ORDER — TRIAMCINOLONE ACETONIDE 40 MG/ML
80 INJECTION, SUSPENSION INTRA-ARTICULAR; INTRAMUSCULAR
Status: COMPLETED | OUTPATIENT
Start: 2019-06-27 | End: 2019-06-27

## 2019-07-15 ENCOUNTER — HOSPITAL ENCOUNTER (OUTPATIENT)
Dept: PHYSICAL THERAPY | Facility: HOSPITAL | Age: 59
Setting detail: THERAPIES SERIES
Discharge: HOME | End: 2019-07-15

## 2019-07-15 DIAGNOSIS — R53.1 WEAKNESS: ICD-10-CM

## 2019-07-15 DIAGNOSIS — R26.2 DIFFICULTY WALKING: ICD-10-CM

## 2019-07-15 DIAGNOSIS — M25.561 CHRONIC PAIN OF RIGHT KNEE: ICD-10-CM

## 2019-07-15 DIAGNOSIS — G89.29 CHRONIC PAIN OF RIGHT KNEE: ICD-10-CM

## 2019-07-15 DIAGNOSIS — M25.552 LEFT HIP PAIN: Primary | ICD-10-CM

## 2019-07-15 PROCEDURE — 97161 PT EVAL LOW COMPLEX 20 MIN: CPT

## 2019-07-15 PROCEDURE — 97110 THERAPEUTIC EXERCISES: CPT

## 2019-07-15 NOTE — THERAPY EVALUATION
Outpatient Physical Therapy Ortho Initial Evaluation  [facility]     Patient Name: Zoila Delgado  : 1960  MRN: [id number]  Today's Date: 7/15/2019      Visit Date: 07/15/2019    Patient Active Problem List   Diagnosis   • Patellofemoral pain syndrome   • Knee crepitus   • Chondromalacia, knee   • Gastroesophageal reflux disease without esophagitis   • Monsivais's esophagus without dysplasia   • Primary osteoarthritis of right knee   • Constipation   • Gastroparesis        Past Medical History:   Diagnosis Date   • Ankle sprain    • Cervical disc disorder    • Diverticular disease    • Diverticulitis    • Dyspepsia    • Gastritis    • GERD (gastroesophageal reflux disease)    • HH (hiatus hernia)    • Hyperlipidemia    • Renal cyst     left   • Rotator cuff syndrome         Past Surgical History:   Procedure Laterality Date   • COLONOSCOPY  2015    balbina mallory   • ENDOMETRIAL ABLATION     • ENDOSCOPY  2015    HH, Z-line irregular, 42 cm. fromincisors, chronic inflammation   • ENDOSCOPY N/A 2019    Procedure: ESOPHAGOGASTRODUODENOSCOPY WITH COLD BIOPSIES;  Surgeon: Archana Arias MD;  Location: [facility] ENDOSCOPY;  Service: Gastroenterology   • TONSILECTOMY, ADENOIDECTOMY, BILATERAL MYRINGOTOMY AND TUBES         Visit Dx:     ICD-10-CM ICD-9-CM   1. Left hip pain M25.552 719.45   2. Chronic pain of right knee M25.561 719.46    G89.29 338.29   3. Difficulty walking R26.2 719.7   4. Weakness R53.1 780.79         Patient History     Row Name 07/15/19 0800             History    Chief Complaint  Pain  -RS      Type of Pain  Hip pain;Knee pain  -RS      Date Current Problem(s) Began  05/15/19  -RS      Brief Description of Current Complaint  The pt is a 58 yo female who presents with L hip and R knee pain ( L hamstring) pain present a couple of months. She reports her hamstring and hip pain began during yoga when he was doing a runners stretch and she heard a pop then felt pain in her  hamstring. She enjoys cycling, gardening, hiking, walking, volunteering. She denies numbness or tingling in her leg, has numbness/ tingling in her feet at night. She has trouble with stairs/ hills with her L hamstring, her pain is worse with sitting.   -RS      Previous treatment for THIS PROBLEM  Injections;Massage  -RS      Patient/Caregiver Goals  Relieve pain;Return to prior level of function;Improve strength;Know what to do to help the symptoms  -RS      Hand Dominance  right-handed  -RS         Pain     Pain Location  Hip  -RS      Pain at Present  1  -RS      Pain at Best  0  -RS      Pain at Worst  8 pre injection  -RS      Is your sleep disturbed?  Yes  -RS      Difficulties at work?  retired  -RS      Difficulties with ADL's?  prolonged sitting  -RS      Difficulties with recreational activities?  unable to run, do yoga, hike hills  -RS         Daily Activities    Primary Language  English  -RS      How does patient learn best?  Reading  -RS      Pt Participated in POC and Goals  Yes  -RS         Safety    Are you being hurt, hit, or frightened by anyone at home or in your life?  No  -RS      Are you being neglected by a caregiver  No  -RS        User Key  (r) = Recorded By, (t) = Taken By, (c) = Cosigned By    Initials Name Provider Type    RS Maria Ines Carty, PT Physical Therapist          PT Ortho     Row Name 07/15/19 0800       Quarter Clearing    Quarter Clearing  Lower Quarter Clearing  -RS       DTR- Lower Quarter Clearing    Patellar tendon (L2-4)  Bilateral:;2- Normal response  -RS    Achilles tendon (S1-2)  Bilateral:;2- Normal response  -RS       Neural Tension Signs- Lower Quarter Clearing    Slump  Bilateral:;Negative  -RS       Lumbar ROM Screen- Lower Quarter Clearing    Lumbar Flexion  Impaired 75% WNL- no increased pain  -RS    Lumbar Extension  Normal  -RS    Lumbar Lateral Flexion  Normal  -RS       Special Tests/Palpation    Special Tests/Palpation  Lumbar/SI;Hip  -RS        Lumbosacral Accessory Motions    Lumbosacral Accessory Motions Tested?  Yes  -RS    PA Glide- L2  Left:;Hypomobile;Left pain  -RS    PA Glide- L3  Left:;Hypomobile;Left pain  -RS    PA Glide- L4  Left:;Hypomobile;Left pain  -RS       Lumbosacral Palpation    Lumbosacral Palpation?  Yes  -RS    Piriformis  Left:;Tender  -RS    Quadratus Lumborum  Left:;Tender  -RS    Hamstring  Left:;Tender  -RS       MMT (Manual Muscle Testing)    Rt Lower Ext  Rt Hip Extension;Rt Hip ABduction;Rt Knee Extension;Rt Knee Flexion;Rt Ankle Plantarflexion;Rt Ankle Dorsiflexion  -RS    Lt Lower Ext  Lt Hip Extension;Lt Hip ABduction;Lt Knee Extension;Lt Knee Flexion;Lt Ankle Plantarflexion;Lt Ankle Dorsiflexion  -RS       MMT Right Lower Ext    Rt Hip Extension MMT, Gross Movement  (4+/5) good plus  -RS    Rt Hip ABduction MMT, Gross Movement  (4/5) good  -RS    Rt Knee Extension MMT, Gross Movement  (5/5) normal  -RS    Rt Knee Flexion MMT, Gross Movement  (5/5) normal  -RS    Rt Ankle Plantarflexion MMT, Gross Movement  (5/5) normal  -RS    Rt Ankle Dorsiflexion MMT, Gross Movement  (5/5) normal  -RS       MMT Left Lower Ext    Lt Hip Extension MMT, Gross Movement  (4-/5) good minus  -RS    Lt Hip ABduction MMT, Gross Movement  (4-/5) good minus  -RS    Lt Knee Extension MMT, Gross Movement  (5/5) normal  -RS    Lt Knee Flexion MMT, Gross Movement  (4/5) good  -RS    Lt Ankle Plantarflexion MMT, Gross Movement  (5/5) normal  -RS    Lt Ankle Dorsiflexion MMT, Gross Movement  (5/5) normal  -RS       Sensation    Sensation WNL?  WNL  -RS      User Key  (r) = Recorded By, (t) = Taken By, (c) = Cosigned By    Initials Name Provider Type    RS Maria Ines Carty PT Physical Therapist                      Therapy Education  Education Details: Role of outpatient PT, POC, differential diagnosis, initial HEP, expectations, goals.  Given: HEP  Program: New  How Provided: Verbal, Demonstration, Written  Provided to: Patient  Level of  Understanding: Verbalized     PT OP Goals     Row Name 07/15/19 0900          PT Short Term Goals    STG Date to Achieve  07/30/19  -RS     STG 1  Patient will be independent with initial HEP.  -RS     STG 1 Progress  New  -RS     STG 2  The pt will report 50% reduction in pain during walking/ cycling for improved recreational activity performance.   -RS     STG 2 Progress  New  -RS        Long Term Goals    LTG Date to Achieve  09/08/19  -RS     LTG 1  Patient will be independent with progressive HEP for long term management of current condition.  -RS     LTG 1 Progress  New  -RS     LTG 2  Patient will score >/70/80 on the LEFS to indicate improved perceived performance with ADLs.  -RS     LTG 2 Progress  New  -RS     LTG 3  The pt will navigate a full flight of stairs reciprocally with pain no greater than 2/10 for improved community navigation.  -RS     LTG 3 Progress  New  -RS     LTG 4  The pt will demonstrate L hip and LLE strength to 5/5 for improved funcitonal strength.  -RS     LTG 4 Progress  New  -RS     LTG 5  The pt ezequiel return to premorbid gym routine with  and group fitness classes for return to [facility].  -RS     LTG 5 Progress  New  -RS        Time Calculation    PT Goal Re-Cert Due Date  10/13/19  -RS       User Key  (r) = Recorded By, (t) = Taken By, (c) = Cosigned By    Initials Name Provider Type    RS Maria Ines Carty, PT Physical Therapist          PT Assessment/Plan     Row Name 07/15/19 0900          PT Assessment    Functional Limitations  Impaired gait;Performance in leisure activities;Performance in sport activities  -RS     Impairments  Balance;Gait;Poor body mechanics;Pain;Muscle strength  -RS     Assessment Comments  Zoila Delgado is a 59 y.o. female referred to physical therapy for L hip pain/ right knee pain. She presents with a stable clinical presentation, along with no remarkable comorbidities or personal factors that may impact her progress in the plan of care. Pt presents  today with decreased L hamstring and hip strength, increased tenderness to palpation L QL, decreased pelvic girdle strength . her signs and symptoms are consistent with referring diagnosis. The previous impairments limit her ability to climb stairs/ hills, walk longer distance, perform gym routine. Pt will benefit from skilled PT to address the previous impairments and return to PLOF.  -RS     Please refer to paper survey for additional self-reported information  Yes  -RS     Rehab Potential  Good  -RS     Patient/caregiver participated in establishment of treatment plan and goals  Yes  -RS     Patient would benefit from skilled therapy intervention  Yes  -RS        PT Plan    PT Frequency  2x/week  -RS     Predicted Duration of Therapy Intervention (Therapy Eval)  8 weeks  -RS     Planned CPT's?  PT EVAL LOW COMPLEXITY: 81919;PT RE-EVAL: 49204;PT THER PROC EA 15 MIN: 07427;PT THER ACT EA 15 MIN: 35100;PT MANUAL THERAPY EA 15 MIN: 70308;PT NEUROMUSC RE-EDUCATION EA 15 MIN: 98542;PT GAIT TRAINING EA 15 MIN: 00766;PT SELF CARE/HOME MGMT/TRAIN EA 15: 44668;PT HOT OR COLD PACK TREAT MCARE;PT ELECTRICAL STIM UNATTEND: ;PT ULTRASOUND EA 15 MIN: 80128  -RS     Physical Therapy Interventions (Optional Details)  balance training;dry needling;home exercise program;lumbar stabilization;manual therapy techniques;stair training;strengthening;patient/family education  -RS     PT Plan Comments  Assess tolerance for initial HEP, progress as tolerated to include bike, eccentric hamstring strengthening, pelvic girdle strength, and single limb balance activities as tolerated.  -RS       User Key  (r) = Recorded By, (t) = Taken By, (c) = Cosigned By    Initials Name Provider Type    RS Maria Ines Carty, PT Physical Therapist            Exercises     Row Name 07/15/19 0900             Total Minutes    16570 - PT Therapeutic Exercise Minutes  15  -RS         Exercise 1    Exercise Name 1  PPT with TA  -RS      Cueing 1  Verbal  -RS       Reps 1  10  -RS      Time 1  5s  -RS         Exercise 2    Exercise Name 2  piriformis stretch  -RS      Cueing 2  Verbal  -RS      Reps 2  3  -RS      Time 2  20s  -RS         Exercise 3    Exercise Name 3  sidelying clamshell  -RS      Cueing 3  Verbal  -RS      Reps 3  20  -RS         Exercise 4    Exercise Name 4  bridge  -RS      Cueing 4  Verbal  -RS      Reps 4  10  -RS      Time 4  5s  -RS         Exercise 5    Exercise Name 5  90-90 HS stretch  -RS      Cueing 5  Verbal  -RS      Reps 5  3  -RS      Time 5  20s  -RS        User Key  (r) = Recorded By, (t) = Taken By, (c) = Cosigned By    Initials Name Provider Type    RS Maria Ines Carty, PT Physical Therapist                        Outcome Measure Options: Lower Extremity Functional Scale (LEFS), Knee Outcome Score- ADL(57/80)  Knee Outcome Score  Knee Outcome Score Comments: 57/80      Time Calculation:     Start Time: 0830  Stop Time: 0910  Time Calculation (min): 40 min     Therapy Charges for Today     Code Description Service Date Service Provider Modifiers Qty    14080404881 HC PT THER PROC EA 15 MIN 7/15/2019 Maria Ines Carty, PT GP 1    [id number]  PT EVAL LOW COMPLEXITY 2 7/15/2019 Maria Ines Carty, PT GP 1          PT G-Codes  Outcome Measure Options: Lower Extremity Functional Scale (LEFS), Knee Outcome Score- ADL(57/80)         Maria Ines Carty PT  7/15/2019

## 2019-07-24 ENCOUNTER — APPOINTMENT (OUTPATIENT)
Dept: PHYSICAL THERAPY | Facility: HOSPITAL | Age: 59
End: 2019-07-24

## 2019-07-26 ENCOUNTER — APPOINTMENT (OUTPATIENT)
Dept: PHYSICAL THERAPY | Facility: HOSPITAL | Age: 59
End: 2019-07-26

## 2019-07-31 ENCOUNTER — HOSPITAL ENCOUNTER (OUTPATIENT)
Dept: PHYSICAL THERAPY | Facility: HOSPITAL | Age: 59
Setting detail: THERAPIES SERIES
Discharge: HOME | End: 2019-07-31

## 2019-07-31 DIAGNOSIS — G89.29 CHRONIC PAIN OF RIGHT KNEE: ICD-10-CM

## 2019-07-31 DIAGNOSIS — R53.1 WEAKNESS: ICD-10-CM

## 2019-07-31 DIAGNOSIS — M25.561 CHRONIC PAIN OF RIGHT KNEE: ICD-10-CM

## 2019-07-31 DIAGNOSIS — R26.2 DIFFICULTY WALKING: ICD-10-CM

## 2019-07-31 DIAGNOSIS — M25.552 LEFT HIP PAIN: Primary | ICD-10-CM

## 2019-07-31 PROCEDURE — 97110 THERAPEUTIC EXERCISES: CPT

## 2019-08-02 ENCOUNTER — HOSPITAL ENCOUNTER (OUTPATIENT)
Dept: PHYSICAL THERAPY | Facility: HOSPITAL | Age: 59
Setting detail: THERAPIES SERIES
Discharge: HOME | End: 2019-08-02

## 2019-08-02 DIAGNOSIS — R26.2 DIFFICULTY WALKING: ICD-10-CM

## 2019-08-02 DIAGNOSIS — M25.552 LEFT HIP PAIN: Primary | ICD-10-CM

## 2019-08-02 DIAGNOSIS — R53.1 WEAKNESS: ICD-10-CM

## 2019-08-02 DIAGNOSIS — G89.29 CHRONIC PAIN OF RIGHT KNEE: ICD-10-CM

## 2019-08-02 DIAGNOSIS — M25.561 CHRONIC PAIN OF RIGHT KNEE: ICD-10-CM

## 2019-08-02 PROCEDURE — 97110 THERAPEUTIC EXERCISES: CPT | Performed by: PHYSICAL THERAPIST

## 2019-08-02 PROCEDURE — 97140 MANUAL THERAPY 1/> REGIONS: CPT | Performed by: PHYSICAL THERAPIST

## 2019-08-02 NOTE — THERAPY TREATMENT NOTE
Outpatient Physical Therapy Ortho Treatment Note  [facility]     Patient Name: Zoila Delgado  : 1960  MRN: [id number]  Today's Date: 2019      Visit Date: 2019    Visit Dx:    ICD-10-CM ICD-9-CM   1. Left hip pain M25.552 719.45   2. Chronic pain of right knee M25.561 719.46    G89.29 338.29   3. Difficulty walking R26.2 719.7   4. Weakness R53.1 780.79       Patient Active Problem List   Diagnosis   • Patellofemoral pain syndrome   • Knee crepitus   • Chondromalacia, knee   • Gastroesophageal reflux disease without esophagitis   • Monsivais's esophagus without dysplasia   • Primary osteoarthritis of right knee   • Constipation   • Gastroparesis        Past Medical History:   Diagnosis Date   • Ankle sprain    • Cervical disc disorder    • Diverticular disease    • Diverticulitis    • Dyspepsia    • Gastritis    • GERD (gastroesophageal reflux disease)    • HH (hiatus hernia)    • Hyperlipidemia    • Renal cyst     left   • Rotator cuff syndrome         Past Surgical History:   Procedure Laterality Date   • COLONOSCOPY  2015    balbina mallory   • ENDOMETRIAL ABLATION     • ENDOSCOPY  2015    HH, Z-line irregular, 42 cm. fromincisors, chronic inflammation   • ENDOSCOPY N/A 2019    Procedure: ESOPHAGOGASTRODUODENOSCOPY WITH COLD BIOPSIES;  Surgeon: Archana Arias MD;  Location: [facility];  Service: Gastroenterology   • TONSILECTOMY, ADENOIDECTOMY, BILATERAL MYRINGOTOMY AND TUBES                         PT Assessment/Plan     Row Name 19 1052          PT Assessment    Assessment Comments  Ms. Delgado returns today reporting no increase in pain following previous session.  She complains of pain with sitting.  In lieu of strengthening activities we worked on soft tissue work in her L glut/piriformis/proximal HS tissues which demonstrated moderate tautness/trigger points, which resolved somewhat post manual. She continues to be a good candidate for skilled physical  therapy.  She is going out of town and we will assess her condition and likely progress strengthening at that time.   -GJ        PT Plan    PT Plan Comments  Assess response to manual, progress HS strengthening and hip girdle strengthening as able.  consider SL RDL, manual resisted HS conc/ecc in supine with incresaed hip flex, 4 wahy hip with TB  -GJ       User Key  (r) = Recorded By, (t) = Taken By, (c) = Cosigned By    Initials Name Provider Type    Tr Shetty, PT Physical Therapist            Exercises     Row Name 08/02/19 0800 08/02/19 0750 08/02/19 0700       Subjective Comments    Subjective Comments  sitting is still uncomfortable.  I had no issues following last session.   -GJ  --  --       Subjective Pain    Pre-Treatment Pain Level  0  -GJ  --  --       Total Minutes    24884 - PT Therapeutic Exercise Minutes  --  12  -GJ  --    52090 - PT Manual Therapy Minutes  --  30  -GJ  --       Exercise 1    Exercise Name 1  --  --  recumbent bike  -GJ    Time 1  --  --  5 min  -GJ       Exercise 2    Exercise Name 2  --  --  piriformis stretch  -GJ    Cueing 2  --  --  Verbal  -GJ    Reps 2  --  --  3, each  -GJ    Time 2  --  --  20s  -GJ    Additional Comments  --  --  push/pull  -GJ       Exercise 5    Exercise Name 5  --  --  90-90 HS stretch  -GJ    Cueing 5  --  --  Verbal  -GJ    Reps 5  --  --  3  -GJ    Time 5  --  --  20s  -GJ      User Key  (r) = Recorded By, (t) = Taken By, (c) = Cosigned By    Initials Name Provider Type    Tr Shetty, PT Physical Therapist                      Manual Rx (last 36 hours)      Manual Treatments     Row Name 08/02/19 0750 08/02/19 0700          Total Minutes    49232 - PT Manual Therapy Minutes  30  -GJ  --        Manual Rx 1    Manual Rx 1 Location  --  STM L glut tissues, piriformis, proximal HS tissues,   -GJ     Manual Rx 1 Type  --  pt R SL with pillow between knees  -GJ       User Key  (r) = Recorded By, (t) = Taken By, (c) = Cosigned By     Initials Name Provider Type    Tr Shetty, PT Physical Therapist          PT OP Goals     Row Name 08/02/19 0700          PT Short Term Goals    STG Date to Achieve  07/30/19  -GJ     STG 1  Patient will be independent with initial HEP.  -GJ     STG 1 Progress  Met  -GJ     STG 2  The pt will report 50% reduction in pain during walking/ cycling for improved recreational activity performance.   -GJ     STG 2 Progress  Met  -GJ        Long Term Goals    LTG Date to Achieve  09/08/19  -GJ     LTG 1  Patient will be independent with progressive HEP for long term management of current condition.  -GJ     LTG 1 Progress  Ongoing  -GJ     LTG 2  Patient will score >/70/80 on the LEFS to indicate improved perceived performance with ADLs.  -GJ     LTG 2 Progress  Ongoing  -GJ     LTG 3  The pt will navigate a full flight of stairs reciprocally with pain no greater than 2/10 for improved community navigation.  -GJ     LTG 3 Progress  Ongoing  -GJ     LTG 4  The pt will demonstrate L hip and LLE strength to 5/5 for improved funcitonal strength.  -GJ     LTG 4 Progress  Ongoing  -GJ     LTG 5  The pt ezequiel return to premorbid gym routine with  and group fitness classes for return to [facility].  -GJ     LTG 5 Progress  Ongoing  -GJ       User Key  (r) = Recorded By, (t) = Taken By, (c) = Cosigned By    Initials Name Provider Type    Tr Shetty, PT Physical Therapist          Therapy Education  Education Details: educated in self trigger point release with tennis balls for her trip  Given: HEP, Symptoms/condition management, Pain management, Mobility training, Posture/body mechanics  Program: New, Reinforced, Progressed  How Provided: Verbal, Demonstration  Provided to: Patient  Level of Understanding: Teach back education performed, Verbalized, Demonstrated              Time Calculation:   Start Time: 0746  Stop Time: 0830  Time Calculation (min): 44 min  Therapy Charges for Today     Code Description Service  Date Service Provider Modifiers Qty    [id number]  PT THER PROC EA 15 MIN 8/2/2019 Tr Kahn, PT GP 1    [id number]  PT MANUAL THERAPY EA 15 MIN 8/2/2019 Tr Kahn, PT GP 2                    Tr Kahn, PT  8/2/2019

## 2019-08-07 ENCOUNTER — APPOINTMENT (OUTPATIENT)
Dept: PHYSICAL THERAPY | Facility: HOSPITAL | Age: 59
End: 2019-08-07

## 2019-08-09 ENCOUNTER — APPOINTMENT (OUTPATIENT)
Dept: PHYSICAL THERAPY | Facility: HOSPITAL | Age: 59
End: 2019-08-09

## 2019-08-14 ENCOUNTER — APPOINTMENT (OUTPATIENT)
Dept: PHYSICAL THERAPY | Facility: HOSPITAL | Age: 59
End: 2019-08-14

## 2019-08-16 ENCOUNTER — HOSPITAL ENCOUNTER (OUTPATIENT)
Dept: PHYSICAL THERAPY | Facility: HOSPITAL | Age: 59
Setting detail: THERAPIES SERIES
Discharge: HOME | End: 2019-08-16

## 2019-08-16 DIAGNOSIS — M25.552 LEFT HIP PAIN: Primary | ICD-10-CM

## 2019-08-16 PROCEDURE — 97110 THERAPEUTIC EXERCISES: CPT | Performed by: PHYSICAL THERAPIST

## 2019-08-16 NOTE — THERAPY TREATMENT NOTE
Outpatient Physical Therapy Ortho Treatment Note  [facility]     Patient Name: Zoila Delgado  : 1960  MRN: [id number]  Today's Date: 2019      Visit Date: 2019    Visit Dx:    ICD-10-CM ICD-9-CM   1. Left hip pain M25.552 719.45       Patient Active Problem List   Diagnosis   • Patellofemoral pain syndrome   • Knee crepitus   • Chondromalacia, knee   • Gastroesophageal reflux disease without esophagitis   • Monsivais's esophagus without dysplasia   • Primary osteoarthritis of right knee   • Constipation   • Gastroparesis        Past Medical History:   Diagnosis Date   • Ankle sprain    • Cervical disc disorder    • Diverticular disease    • Diverticulitis    • Dyspepsia    • Gastritis    • GERD (gastroesophageal reflux disease)    • HH (hiatus hernia)    • Hyperlipidemia    • Renal cyst     left   • Rotator cuff syndrome         Past Surgical History:   Procedure Laterality Date   • COLONOSCOPY  2015    mohamud,nibh   • ENDOMETRIAL ABLATION     • ENDOSCOPY  2015    HH, Z-line irregular, 42 cm. fromincisors, chronic inflammation   • ENDOSCOPY N/A 2019    Procedure: ESOPHAGOGASTRODUODENOSCOPY WITH COLD BIOPSIES;  Surgeon: Archana Arias MD;  Location: [facility] ENDOSCOPY;  Service: Gastroenterology   • TONSILECTOMY, ADENOIDECTOMY, BILATERAL MYRINGOTOMY AND TUBES                         PT Assessment/Plan     Row Name 19 1024 19 1020       PT Assessment    Assessment Comments  Ms. Delgado wished to proceed with trial of DDN.  We needled her L glut medius/piriformis tissue with several moderate LTR's.  NO immediate adverse response.   -GJ  Ms. Delgado returns following her trip out [place].  OVeall she reports improvement of her L HS injury, just slow.  Most aggravating wtih prolonged sitting and with start up of hiking/stairs.  We progressed her strengthening program slightly today.  She reported cramping in her L gastroc with HS isometrics and L hip adductors with gastroc  stretch.  Overall, Ms. Delgado is progressing toward functional goals.  I believe she is likely over doing daily activities that may slow her progress.   -GJ       PT Plan    PT Plan Comments  assess repsonse to DDN of L glut med/piriformis tissue  -GJ  assess reponse to resuming strengthening activities.  Progress program with 4 way hip, manual resisted HS con/ecc in supine  -GJ      User Key  (r) = Recorded By, (t) = Taken By, (c) = Cosigned By    Initials Name Provider Type    Tr Shetty, PT Physical Therapist            Exercises     Row Name 08/16/19 0744 08/16/19 0700          Subjective Comments    Subjective Comments  --  i had pain with prolonged sitting, i do think the massage helped.  I would have pain with starting hiking.  I 'm getting bbetter, just slowly. I do have an appointment to get a shot in my R knee next week  -GJ        Subjective Pain    Pre-Treatment Pain Level  --  0  -GJ        Total Minutes    04689 - PT Therapeutic Exercise Minutes  30  -GJ  --        Exercise 1    Exercise Name 1  --  eliptical  -GJ     Time 1  --  5 min  -GJ        Exercise 4    Exercise Name 4  --  bridge on swiss ball  -GJ     Cueing 4  --  Verbal  -GJ     Sets 4  --  2  -GJ     Reps 4  --  10  -GJ     Time 4  --  5s  -GJ     Additional Comments  --  legs straight  -GJ        Exercise 7    Exercise Name 7  --  HS iso on swiss ball  -GJ     Cueing 7  --  Verbal;Demo  -GJ     Reps 7  --  5  -GJ     Time 7  --  30s   -GJ     Additional Comments  --  pt started to develolp gastroc cramping with this activity, reports this is not abnormal  -GJ        Exercise 8    Exercise Name 8  --  prone HS curl  -GJ     Cueing 8  --  Verbal;Demo  -GJ     Sets 8  --  2  -GJ     Reps 8  --  10  -GJ     Time 8  --  3#  -GJ     Additional Comments  --  5 second eccentric phasae  -GJ        Exercise 9    Exercise Name 9  --  SL hip abd  -GJ     Cueing 9  --  Verbal  -GJ     Sets 9  --  2  -GJ     Reps 9  --  10  -GJ     Time 9  --   2#  -GJ        Exercise 10    Exercise Name 10  --  HL hip add  -GJ     Cueing 10  --  Verbal  -GJ     Sets 10  --  2  -GJ     Reps 10  --  10  -GJ     Time 10  --  5s  -GJ        Exercise 11    Exercise Name 11  --  AR Press  -GJ     Cueing 11  --  Verbal  -GJ     Sets 11  --  2  -GJ     Reps 11  --  10  -GJ     Time 11  --  RTB  -GJ     Additional Comments  --  with TA activation   -GJ        Exercise 13    Exercise Name 13  --  L SL RDL  -GJ     Cueing 13  --  Verbal;Demo  -GJ     Reps 13  --  10  -GJ        Exercise 14    Exercise Name 14  --  standing HS curl, bilatearl   -GJ     Cueing 14  --  Verbal;Demo  -GJ     Sets 14  --  2  -GJ     Reps 14  --  10  -GJ     Additional Comments  --  2#  -GJ        Exercise 15    Exercise Name 15  --  gastroc stretch, froms tep  -GJ     Cueing 15  --  Verbal;Demo  -GJ     Reps 15  --  3  -GJ     Time 15  --  20 s  -GJ     Additional Comments  --  this created cramping of L hip adductor muscles  -       User Key  (r) = Recorded By, (t) = Taken By, (c) = Cosigned By    Initials Name Provider Type     Tr Kahn, PT Physical Therapist                      Manual Rx (last 36 hours)      Manual Treatments     Row Name 08/16/19 0900             Manual Rx 2    Manual Rx 2 Location  intramuscular manual therapy   - Assessed pt. for Dry Needling and intramuscular manual therapy. Discussed risks/benefits of Dry Needling with pt, including but not limited to muscle soreness, bruising, vasovagal response, pneumothorax, nerve injury. Patient signed written consent to proceed with treatment.    Patient position during treatment: R SLwith pillow between knees  Muscles treated: L glut medius, L piriformis tissue  Response to treatment: several moderate LTR's. No immediate adverse response.     palpation used before, during, and after to facilitate assessment Clean needle technique observed at all times, precautions for lung fields, neurovascular structures observed.    DDN  performed by Tr Kahn II, PT, DPT     Manual Rx 2 Grade  L glut med/piriformis tissue  -GJ        User Key  (r) = Recorded By, (t) = Taken By, (c) = Cosigned By    Initials Name Provider Type    Tr Shetty, PT Physical Therapist          PT OP Goals     Row Name 08/16/19 0700          PT Short Term Goals    STG Date to Achieve  07/30/19  -GJ     STG 1  Patient will be independent with initial HEP.  -GJ     STG 1 Progress  Met  -GJ     STG 2  The pt will report 50% reduction in pain during walking/ cycling for improved recreational activity performance.   -GJ     STG 2 Progress  Met  -GJ        Long Term Goals    LTG Date to Achieve  09/08/19  -GJ     LTG 1  Patient will be independent with progressive HEP for long term management of current condition.  -GJ     LTG 1 Progress  Ongoing  -GJ     LTG 2  Patient will score >/70/80 on the LEFS to indicate improved perceived performance with ADLs.  -GJ     LTG 2 Progress  Ongoing  -GJ     LTG 3  The pt will navigate a full flight of stairs reciprocally with pain no greater than 2/10 for improved community navigation.  -GJ     LTG 3 Progress  Partially Met  -GJ     LTG 3 Progress Comments  reportintg pain with start up, will continue to assess  -GJ     LTG 4  The pt will demonstrate L hip and LLE strength to 5/5 for improved funcitonal strength.  -GJ     LTG 4 Progress  Ongoing  -GJ     LTG 5  The pt ezequiel return to premorbid gym routine with  and group fitness classes for return to [employer].  -GJ     LTG 5 Progress  Ongoing  -GJ       User Key  (r) = Recorded By, (t) = Taken By, (c) = Cosigned By    Initials Name Provider Type    Tr Shetty, PT Physical Therapist          Therapy Education  Education Details: reviewed risks/benefits of DDN, pt. gave verbal/written consent              Time Calculation:   Start Time: 0810  Stop Time: 0830  Time Calculation (min): 20 min  Therapy Charges for Today     Code Description Service Date Service Provider  Modifiers Qty    19964357696  PT SELF PAY DRY NEEDLING SESSION 8/16/2019 Tr Kahn, PT  1                    Tr Kahn, PT  8/16/2019

## 2019-08-16 NOTE — THERAPY TREATMENT NOTE
Outpatient Physical Therapy Ortho Treatment Note  [facility]     Patient Name: Zoila Delgado  : 1960  MRN: [id number]  Today's Date: 2019      Visit Date: 2019    Visit Dx:    ICD-10-CM ICD-9-CM   1. Left hip pain M25.552 719.45       Patient Active Problem List   Diagnosis   • Patellofemoral pain syndrome   • Knee crepitus   • Chondromalacia, knee   • Gastroesophageal reflux disease without esophagitis   • Monsivais's esophagus without dysplasia   • Primary osteoarthritis of right knee   • Constipation   • Gastroparesis        Past Medical History:   Diagnosis Date   • Ankle sprain    • Cervical disc disorder    • Diverticular disease    • Diverticulitis    • Dyspepsia    • Gastritis    • GERD (gastroesophageal reflux disease)    • HH (hiatus hernia)    • Hyperlipidemia    • Renal cyst     left   • Rotator cuff syndrome         Past Surgical History:   Procedure Laterality Date   • COLONOSCOPY  2015    mohamud,nirc   • ENDOMETRIAL ABLATION     • ENDOSCOPY  2015    HH, Z-line irregular, 42 cm. fromincisors, chronic inflammation   • ENDOSCOPY N/A 2019    Procedure: ESOPHAGOGASTRODUODENOSCOPY WITH COLD BIOPSIES;  Surgeon: Archana Arias MD;  Location: [facility] ENDOSCOPY;  Service: Gastroenterology   • TONSILECTOMY, ADENOIDECTOMY, BILATERAL MYRINGOTOMY AND TUBES                         PT Assessment/Plan     Row Name 19 1020          PT Assessment    Assessment Comments  Ms. Delgado returns following her trip out [place].  OVeall she reports improvement of her L HS injury, just slow.  Most aggravating wtih prolonged sitting and with start up of hiking/stairs.  We progressed her strengthening program slightly today.  She reported cramping in her L gastroc with HS isometrics and L hip adductors with gastroc stretch.  Overall, Ms. Delgado is progressing toward functional goals.  I believe she is likely over doing daily activities that may slow her progress.   -GJ        PT Plan     PT Plan Comments  assess reponse to resuming strengthening activities.  Progress program with 4 way hip, manual resisted HS con/ecc in supine  -GJ       User Key  (r) = Recorded By, (t) = Taken By, (c) = Cosigned By    Initials Name Provider Type    Tr Shetty, PT Physical Therapist            Exercises     Row Name 08/16/19 0744 08/16/19 0700          Subjective Comments    Subjective Comments  --  i had pain with prolonged sitting, i do think the massage helped.  I would have pain with starting hiking.  I 'm getting bbetter, just slowly. I do have an appointment to get a shot in my R knee next week  -GJ        Subjective Pain    Pre-Treatment Pain Level  --  0  -GJ        Total Minutes    03644 - PT Therapeutic Exercise Minutes  30  -GJ  --        Exercise 1    Exercise Name 1  --  eliptical  -GJ     Time 1  --  5 min  -GJ        Exercise 4    Exercise Name 4  --  bridge on swiss ball  -GJ     Cueing 4  --  Verbal  -GJ     Sets 4  --  2  -GJ     Reps 4  --  10  -GJ     Time 4  --  5s  -GJ     Additional Comments  --  legs straight  -GJ        Exercise 7    Exercise Name 7  --  HS iso on swiss ball  -GJ     Cueing 7  --  Verbal;Demo  -GJ     Reps 7  --  5  -GJ     Time 7  --  30s   -GJ     Additional Comments  --  pt started to develolp gastroc cramping with this activity, reports this is not abnormal  -GJ        Exercise 8    Exercise Name 8  --  prone HS curl  -GJ     Cueing 8  --  Verbal;Demo  -GJ     Sets 8  --  2  -GJ     Reps 8  --  10  -GJ     Time 8  --  3#  -GJ     Additional Comments  --  5 second eccentric phasae  -GJ        Exercise 9    Exercise Name 9  --  SL hip abd  -GJ     Cueing 9  --  Verbal  -GJ     Sets 9  --  2  -GJ     Reps 9  --  10  -GJ     Time 9  --  2#  -GJ        Exercise 10    Exercise Name 10  --  HL hip add  -GJ     Cueing 10  --  Verbal  -GJ     Sets 10  --  2  -GJ     Reps 10  --  10  -GJ     Time 10  --  5s  -GJ        Exercise 11    Exercise Name 11  --  AR Press  -GJ      Cueing 11  --  Verbal  -GJ     Sets 11  --  2  -GJ     Reps 11  --  10  -GJ     Time 11  --  RTB  -GJ     Additional Comments  --  with TA activation   -GJ        Exercise 13    Exercise Name 13  --  L SL RDL  -GJ     Cueing 13  --  Verbal;Demo  -GJ     Reps 13  --  10  -GJ        Exercise 14    Exercise Name 14  --  standing HS curl, bilatearl   -GJ     Cueing 14  --  Verbal;Demo  -GJ     Sets 14  --  2  -GJ     Reps 14  --  10  -GJ     Additional Comments  --  2#  -GJ        Exercise 15    Exercise Name 15  --  gastroc stretch, froms tep  -GJ     Cueing 15  --  Verbal;Demo  -GJ     Reps 15  --  3  -GJ     Time 15  --  20 s  -GJ     Additional Comments  --  this created cramping of L hip adductor muscles  -GJ       User Key  (r) = Recorded By, (t) = Taken By, (c) = Cosigned By    Initials Name Provider Type    Tr Shetty, PT Physical Therapist                       PT OP Goals     Row Name 08/16/19 0700          PT Short Term Goals    STG Date to Achieve  07/30/19  -GJ     STG 1  Patient will be independent with initial HEP.  -GJ     STG 1 Progress  Met  -GJ     STG 2  The pt will report 50% reduction in pain during walking/ cycling for improved recreational activity performance.   -GJ     STG 2 Progress  Met  -GJ        Long Term Goals    LTG Date to Achieve  09/08/19  -GJ     LTG 1  Patient will be independent with progressive HEP for long term management of current condition.  -GJ     LTG 1 Progress  Ongoing  -GJ     LTG 2  Patient will score >/70/80 on the LEFS to indicate improved perceived performance with ADLs.  -GJ     LTG 2 Progress  Ongoing  -GJ     LTG 3  The pt will navigate a full flight of stairs reciprocally with pain no greater than 2/10 for improved community navigation.  -GJ     LTG 3 Progress  Partially Met  -GJ     LTG 3 Progress Comments  reportintg pain with start up, will continue to assess  -GJ     LTG 4  The pt will demonstrate L hip and LLE strength to 5/5 for improved funcitonal  strength.  -     LTG 4 Progress  Ongoing  -     LTG 5  The pt ezequiel return to premorbid gym routine with  and group fitness classes for return to [facility].  -     LTG 5 Progress  Ongoing  -       User Key  (r) = Recorded By, (t) = Taken By, (c) = Cosigned By    Initials Name Provider Type     Tr Kahn, PT Physical Therapist          Therapy Education  Education Details: discussed activity modification and cautioned pt. to not over do it today while helping friend trim shrubs/bushes.  Discussed progress to date, physiology and healing of tissues, specificually relevant time frames.   Given: HEP, Symptoms/condition management, Pain management, Mobility training, Posture/body mechanics  Program: Reinforced, New  How Provided: Verbal, Demonstration  Provided to: Patient  Level of Understanding: Verbalized, Teach back education performed              Time Calculation:   Start Time: 0740  Stop Time: 0810  Time Calculation (min): 30 min  Therapy Charges for Today     Code Description Service Date Service Provider Modifiers Qty    59743079408 HC PT THER PROC EA 15 MIN 8/16/2019 Tr Kahn, PT GP 2                    Tr Kahn, PT  8/16/2019

## 2019-08-22 ENCOUNTER — HOSPITAL ENCOUNTER (OUTPATIENT)
Dept: PHYSICAL THERAPY | Facility: HOSPITAL | Age: 59
Setting detail: THERAPIES SERIES
Discharge: HOME | End: 2019-08-22

## 2019-08-22 ENCOUNTER — CLINICAL SUPPORT (OUTPATIENT)
Dept: ORTHOPEDIC SURGERY | Facility: CLINIC | Age: 59
End: 2019-08-22

## 2019-08-22 VITALS — BODY MASS INDEX: 20.89 KG/M2 | WEIGHT: 130 LBS | HEIGHT: 66 IN

## 2019-08-22 DIAGNOSIS — M25.552 LEFT HIP PAIN: Primary | ICD-10-CM

## 2019-08-22 DIAGNOSIS — M17.11 PRIMARY OSTEOARTHRITIS OF RIGHT KNEE: Primary | ICD-10-CM

## 2019-08-22 DIAGNOSIS — G89.29 CHRONIC PAIN OF RIGHT KNEE: ICD-10-CM

## 2019-08-22 DIAGNOSIS — M25.561 CHRONIC PAIN OF RIGHT KNEE: ICD-10-CM

## 2019-08-22 DIAGNOSIS — R53.1 WEAKNESS: ICD-10-CM

## 2019-08-22 DIAGNOSIS — R26.2 DIFFICULTY WALKING: ICD-10-CM

## 2019-08-22 PROCEDURE — 20610 DRAIN/INJ JOINT/BURSA W/O US: CPT | Performed by: ORTHOPAEDIC SURGERY

## 2019-08-22 PROCEDURE — 97110 THERAPEUTIC EXERCISES: CPT

## 2019-08-22 RX ORDER — LIDOCAINE HYDROCHLORIDE 10 MG/ML
8 INJECTION, SOLUTION EPIDURAL; INFILTRATION; INTRACAUDAL; PERINEURAL
Status: COMPLETED | OUTPATIENT
Start: 2019-08-22 | End: 2019-08-22

## 2019-08-22 RX ORDER — TRIAMCINOLONE ACETONIDE 40 MG/ML
80 INJECTION, SUSPENSION INTRA-ARTICULAR; INTRAMUSCULAR
Status: COMPLETED | OUTPATIENT
Start: 2019-08-22 | End: 2019-08-22

## 2019-08-22 RX ADMIN — TRIAMCINOLONE ACETONIDE 80 MG: 40 INJECTION, SUSPENSION INTRA-ARTICULAR; INTRAMUSCULAR at 15:25

## 2019-08-22 RX ADMIN — LIDOCAINE HYDROCHLORIDE 8 ML: 10 INJECTION, SOLUTION EPIDURAL; INFILTRATION; INTRACAUDAL; PERINEURAL at 15:25

## 2019-08-22 NOTE — PROGRESS NOTES
Procedure   Large Joint Arthrocentesis: R knee  Date/Time: 8/22/2019 3:25 PM  Consent given by: patient  Site marked: site marked  Timeout: Immediately prior to procedure a time out was called to verify the correct patient, procedure, equipment, support staff and site/side marked as required   Supporting Documentation  Indications: pain   Procedure Details  Location: knee - R knee  Preparation: Patient was prepped and draped in the usual sterile fashion  Needle size: 22 G  Approach: superior (LATERAL)  Medications administered: 8 mL lidocaine PF 1% 1 %; 80 mg triamcinolone acetonide 40 MG/ML  Patient tolerance: patient tolerated the procedure well with no immediate complications          Cc: Right knee DJD    Patient presents to clinic today for right knee intra-articular cortisone injection(s). I explained details of injections as well as risks, benefits and alternatives with the patient today, had all questions answered, wished to proceed with injections. Patient was instructed to watch for signs or symptoms of infection including redness, swelling, warmth to the touch, or significant increased pain and to contact our office immediately if any of these issues were noted.

## 2019-08-22 NOTE — THERAPY TREATMENT NOTE
Outpatient Physical Therapy Ortho Treatment Note  [facility]     Patient Name: Zoila Delgado  : 1960  MRN: [id number]  Today's Date: 2019      Visit Date: 2019    Visit Dx:    ICD-10-CM ICD-9-CM   1. Left hip pain M25.552 719.45   2. Chronic pain of right knee M25.561 719.46    G89.29 338.29   3. Difficulty walking R26.2 719.7   4. Weakness R53.1 780.79       Patient Active Problem List   Diagnosis   • Patellofemoral pain syndrome   • Knee crepitus   • Chondromalacia, knee   • Gastroesophageal reflux disease without esophagitis   • Monsivais's esophagus without dysplasia   • Primary osteoarthritis of right knee   • Constipation   • Gastroparesis        Past Medical History:   Diagnosis Date   • Ankle sprain    • Cervical disc disorder    • Diverticular disease    • Diverticulitis    • Dyspepsia    • Gastritis    • GERD (gastroesophageal reflux disease)    • HH (hiatus hernia)    • Hyperlipidemia    • Renal cyst     left   • Rotator cuff syndrome         Past Surgical History:   Procedure Laterality Date   • COLONOSCOPY  2015    balbina mallory   • ENDOMETRIAL ABLATION     • ENDOSCOPY  2015    HH, Z-line irregular, 42 cm. fromincisors, chronic inflammation   • ENDOSCOPY N/A 2019    Procedure: ESOPHAGOGASTRODUODENOSCOPY WITH COLD BIOPSIES;  Surgeon: Archana Arias MD;  Location: [facility] ENDOSCOPY;  Service: Gastroenterology   • TONSILECTOMY, ADENOIDECTOMY, BILATERAL MYRINGOTOMY AND TUBES                         PT Assessment/Plan     Row Name 19 1300          PT Assessment    Assessment Comments  Ms. Delgado returns today reporting pain relief following DDN. Continued today with core stability, hip abductor and adductor strengthening . Pt requires frequent cues for form on SL RDL. Reports muscle fatigue at end of session today.  -CA        PT Plan    PT Plan Comments  Assess response to last session. Consider addition of nordic HS curl.  -CA       User Key  (r) = Recorded By,  "(t) = Taken By, (c) = Cosigned By    Initials Name Provider Type    CA Alexa Maynard, PT Physical Therapist            Exercises     Row Name 08/22/19 1300             Subjective Comments    Subjective Comments  feeling good today, had some soreness after the dry needling but then it felt better  -CA         Subjective Pain    Able to rate subjective pain?  yes  -CA      Pre-Treatment Pain Level  0  -CA         Total Minutes    36356 - PT Therapeutic Exercise Minutes  40  -CA         Exercise 1    Exercise Name 1  eliptical  -CA      Time 1  5 min  -CA         Exercise 2    Exercise Name 2  B side plank  -CA      Sets 2  3  -CA      Reps 2  10s ea  -CA      Additional Comments  bottom knee bend and abducting into table, top leg straight maintaining neutral hip abd  -CA         Exercise 3    Exercise Name 3  hip abd iso into wall  -CA      Cueing 3  Verbal  -CA      Reps 3  5 B  -CA      Time 3  10s  -CA      Additional Comments  \"storks\"  -CA         Exercise 4    Exercise Name 4  bridge w/ HS curl on swiss ball  -CA      Cueing 4  Verbal  -CA      Sets 4  2  -CA      Reps 4  10  -CA      Additional Comments  legs straight, maintain bridge, roll ball in/out, and then come down to table  -CA         Exercise 5    Exercise Name 5  manual resisted HS curl in supine with full hip flexion conc and ecc  -CA      Cueing 5  Verbal  -CA      Reps 5  10  -CA      Time 5  5s ecc phase  -CA         Exercise 6    Exercise Name 6  coppenhagen plank  -CA      Cueing 6  Verbal  -CA      Reps 6  2 B  -CA      Time 6  10s  -CA         Exercise 7    Exercise Name 7  banded side steps  -CA      Cueing 7  Verbal  -CA      Reps 7  3 laps at ballet bar  -CA      Time 7  RTB  -CA         Exercise 11    Exercise Name 11  AR Press  -CA      Cueing 11  Verbal  -CA      Sets 11  2  -CA      Reps 11  10  -CA      Time 11  RTB  -CA      Additional Comments  therapist applied lateral to medial force applied at iliac crest  -CA         Exercise " 12    Exercise Name 12  prone quad/hip flex stretch w/ strap  -CA      Cueing 12  Verbal  -CA      Reps 12  3  -CA      Time 12  20s  -CA         Exercise 13    Exercise Name 13  SL RDL  -CA      Cueing 13  Verbal;Demo  -CA      Reps 13  10 B  -CA      Additional Comments  holding ballet bar  -CA         Exercise 14    Exercise Name 14  standing HS curl, bilatearl   -CA      Cueing 14  Verbal;Demo  -CA      Sets 14  2  -CA      Reps 14  10  -CA      Time 14  2#  -CA        User Key  (r) = Recorded By, (t) = Taken By, (c) = Cosigned By    Initials Name Provider Type    CA Alexa Maynard, PT Physical Therapist                                          Time Calculation:   Start Time: 1300  Stop Time: 1340  Time Calculation (min): 40 min  Total Timed Code Minutes- PT: 40 minute(s)  Therapy Charges for Today     Code Description Service Date Service Provider Modifiers Qty    16262227919 HC PT THER PROC EA 15 MIN 8/22/2019 Alexa Maynard, PT GP 3                    Alexa Maynard, PT  8/22/2019

## 2019-08-30 ENCOUNTER — APPOINTMENT (OUTPATIENT)
Dept: PHYSICAL THERAPY | Facility: HOSPITAL | Age: 59
End: 2019-08-30

## 2019-09-04 ENCOUNTER — HOSPITAL ENCOUNTER (OUTPATIENT)
Dept: PHYSICAL THERAPY | Facility: HOSPITAL | Age: 59
Setting detail: THERAPIES SERIES
Discharge: HOME | End: 2019-09-04

## 2019-09-04 DIAGNOSIS — R26.2 DIFFICULTY WALKING: ICD-10-CM

## 2019-09-04 DIAGNOSIS — G89.29 CHRONIC PAIN OF RIGHT KNEE: ICD-10-CM

## 2019-09-04 DIAGNOSIS — M25.561 CHRONIC PAIN OF RIGHT KNEE: ICD-10-CM

## 2019-09-04 DIAGNOSIS — M25.552 LEFT HIP PAIN: Primary | ICD-10-CM

## 2019-09-04 DIAGNOSIS — R53.1 WEAKNESS: ICD-10-CM

## 2019-09-04 PROCEDURE — 97110 THERAPEUTIC EXERCISES: CPT | Performed by: PHYSICAL THERAPIST

## 2019-09-04 NOTE — THERAPY PROGRESS REPORT/RE-CERT
Outpatient Physical Therapy Ortho Progress Note  [facility]     Patient Name: Zoila Delgado  : 1960  MRN: [id number]  Today's Date: 2019      Visit Date: 2019    Visit Dx:    ICD-10-CM ICD-9-CM   1. Left hip pain M25.552 719.45   2. Chronic pain of right knee M25.561 719.46    G89.29 338.29   3. Difficulty walking R26.2 719.7   4. Weakness R53.1 780.79       Patient Active Problem List   Diagnosis   • Patellofemoral pain syndrome   • Knee crepitus   • Chondromalacia, knee   • Gastroesophageal reflux disease without esophagitis   • Monsivais's esophagus without dysplasia   • Primary osteoarthritis of right knee   • Constipation   • Gastroparesis        Past Medical History:   Diagnosis Date   • Ankle sprain    • Cervical disc disorder    • Diverticular disease    • Diverticulitis    • Dyspepsia    • Gastritis    • GERD (gastroesophageal reflux disease)    • HH (hiatus hernia)    • Hyperlipidemia    • Renal cyst     left   • Rotator cuff syndrome         Past Surgical History:   Procedure Laterality Date   • COLONOSCOPY  2015    balbina mallory   • ENDOMETRIAL ABLATION     • ENDOSCOPY  2015    HH, Z-line irregular, 42 cm. fromincisors, chronic inflammation   • ENDOSCOPY N/A 2019    Procedure: ESOPHAGOGASTRODUODENOSCOPY WITH COLD BIOPSIES;  Surgeon: Archana Arias MD;  Location: [facility];  Service: Gastroenterology   • TONSILECTOMY, ADENOIDECTOMY, BILATERAL MYRINGOTOMY AND TUBES         PT Ortho     Row Name 19 6795       Subjective Comments    Subjective Comments  Feeling much better.  Sitting is greatest difficulty- sits on cushion in car for distances but sits on couch painfree now.Able to work out with , walk without pain.  Reports running about 1 block but not interested in resuming further running due to history of knee pain.  -JS       Subjective Pain    Able to rate subjective pain?  yes  -JS       MMT Right Lower Ext    Rt Hip Extension MMT Gross  Movement  (4+/5) good plus  -JS    Rt Hip ABduction MMT, Gross Movement  (4/5) good  -JS    Rt Knee Extension MMT, Gross Movement  (5/5) normal  -JS    Rt Knee Flexion MMT, Gross Movement  (5/5) normal  -JS    Rt Ankle Plantarflexion MMT, Gross Movement  (5/5) normal  -JS    Rt Ankle Dorsiflexion MMT, Gross Movement  (5/5) normal  -JS       MMT Left Lower Ext    Lt Hip Extension MMT, Gross Movement  (4/5) good  -JS    Lt Hip ABduction MMT, Gross Movement  (4-/5) good minus  -JS    Lt Knee Extension MMT, Gross Movement  (5/5) normal  -JS    Lt Knee Flexion MMT, Gross Movement  (5/5) normal pulling L proximal hamstring with resistance  -JS    Lt Ankle Plantarflexion MMT, Gross Movement  (5/5) normal  -JS    Lt Ankle Dorsiflexion MMT, Gross Movement  (5/5) normal  -JS       Flexibility    Flexibility Tested?  Lower Extremity  -JS       Lower Extremity Flexibility    Hamstrings  Right:;Left:;WNL;Moderately limited  -JS      User Key  (r) = Recorded By, (t) = Taken By, (c) = Cosigned By    Initials Name Provider Type    Cecilia Hoffman, PT Physical Therapist                      PT Assessment/Plan     Row Name 09/04/19 1045          PT Assessment    Assessment Comments  Zoila Delgado has been seen for 7 physical therapy sessions for L hip, R knee pain.  Treatment has included therapeutic exercise. Progress to physical therapy goals is good.  She will benefit from 1-2 visits of continued skilled physical therapy to address remaining impairments and functional limitations.  -JS     Please refer to paper survey for additional self-reported information  Yes  -JS     Rehab Potential  Good  -JS     Patient/caregiver participated in establishment of treatment plan and goals  Yes  -JS     Patient would benefit from skilled therapy intervention  Yes  -JS        PT Plan    Predicted Duration of Therapy Intervention (Therapy Eval)  1-2 visits  -JS     PT Plan Comments  Continue PT x 1-2 visits to ensure independence in HEP,  progression of exercises and progress toward goals.  -JS       User Key  (r) = Recorded By, (t) = Taken By, (c) = Cosigned By    Initials Name Provider Type    JS Cecilia Patel, PT Physical Therapist            OP Exercises     Row Name 09/04/19 1045             Subjective Comments    Subjective Comments  Feeling much better.  Sitting is greatest difficulty- sits on cushion in car for distances but sits on couch painfree now.Able to work out with , walk without pain.  Reports running about 1 block but not interested in resuming further running due to history of knee pain.  -JS         Subjective Pain    Able to rate subjective pain?  yes  -JS         Total Minutes    79875 - PT Therapeutic Exercise Minutes  45 including education/review of current gym ex  -JS         Exercise 1    Exercise Name 1  eliptical  -JS      Cueing 1  Verbal  -JS      Reps 1  10  -JS      Time 1  5 min  -JS         Exercise 2    Exercise Name 2  B side plank  -JS      Cueing 2  Verbal  -JS      Sets 2  3  -JS      Reps 2  10s ea  -JS         Exercise 4    Exercise Name 4  bridge w/ HS curl on swiss ball  -JS      Cueing 4  Verbal  -JS      Sets 4  2  -JS      Reps 4  10  -JS      Time 4  5s  -JS      Additional Comments  legs straight, maintain bridge roll in & out, then return to table  -JS         Exercise 6    Exercise Name 6  coppenhagen plank  -JS      Cueing 6  Verbal  -JS      Sets 6  2  -JS      Reps 6  2 B  -JS      Time 6  10s  -JS         Exercise 7    Exercise Name 7  banded side steps  -JS      Cueing 7  Verbal  -JS      Reps 7  3 laps at ballet bar  -JS      Time 7  GTB  -JS         Exercise 15    Exercise Name 15  gastroc stretch, from step  -JS      Cueing 15  Verbal;Demo  -JS      Reps 15  3  -JS      Time 15  20 s  -JS         Exercise 16    Exercise Name 16  monster walk with green theraband  -JS      Reps 16  3 laps 8' each forward & backward  -JS        User Key  (r) = Recorded By, (t) = Taken By, (c) = Cosigned By     Initials Name Provider Type    Cecilia Hoffman, PT Physical Therapist                       PT OP Goals     Row Name 09/04/19 1045          PT Short Term Goals    STG Date to Achieve  07/30/19  -JS     STG 1  Patient will be independent with initial HEP.  -JS     STG 1 Progress  Met  -JS     STG 2  The pt will report 50% reduction in pain during walking/ cycling for improved recreational activity performance.   -JS     STG 2 Progress  Met  -JS     STG 2 Progress Comments  Currently 90% improved  -JS        Long Term Goals    LTG Date to Achieve  09/08/19  -JS     LTG 1  Patient will be independent with progressive HEP for long term management of current condition.  -JS     LTG 1 Progress  Ongoing;Progressing  -JS     LTG 1 Progress Comments  Ongoing for progression of exercises  -JS     LTG 2  Patient will score >/70/80 on the LEFS to indicate improved perceived performance with ADLs.  -JS     LTG 2 Progress  Met  -JS     LTG 2 Progress Comments  Currently 77/80  -JS     LTG 3  The pt will navigate a full flight of stairs reciprocally with pain no greater than 2/10 for improved community navigation.  -JS     LTG 3 Progress  Met  -JS     LTG 4  The pt will demonstrate L hip and LLE strength to 5/5 for improved funcitonal strength.  -JS     LTG 4 Progress  Partially Met;Ongoing  -JS     LTG 4 Progress Comments  knee strength 5/5, glut med 4-5 & glut max 4/5  -JS     LTG 5  The pt ezequiel return to premorbid gym routine with  and group fitness classes for return to [facility].  -JS     LTG 5 Progress  Partially Met;Ongoing  -JS     LTG 5 Progress Comments  Has returned to work with .  Will reassess return to fitness classes  -       User Key  (r) = Recorded By, (t) = Taken By, (c) = Cosigned By    Initials Name Provider Type    Cecilia Hoffman, PT Physical Therapist          Therapy Education  Education Details: Reviewed HEP, appropriate gym ex & progression of standing theraband exercises  Given: HEP  Program:  Reinforced, New(denies need for written instruction)  How Provided: Verbal  Provided to: Patient  Level of Understanding: Teach back education performed, Demonstrated, Verbalized              Time Calculation:   Start Time: 1045  Stop Time: 1130  Time Calculation (min): 45 min  Therapy Charges for Today     Code Description Service Date Service Provider Modifiers Qty    02875764725 HC PT THER PROC EA 15 MIN 9/4/2019 Cecilia Patel, PT GP 3                    Cecilia Patel PT  9/4/2019

## 2019-09-06 ENCOUNTER — APPOINTMENT (OUTPATIENT)
Dept: PHYSICAL THERAPY | Facility: HOSPITAL | Age: 59
End: 2019-09-06

## 2019-09-09 ENCOUNTER — HOSPITAL ENCOUNTER (OUTPATIENT)
Dept: PHYSICAL THERAPY | Facility: HOSPITAL | Age: 59
Setting detail: THERAPIES SERIES
Discharge: HOME | End: 2019-09-09

## 2019-09-09 DIAGNOSIS — M25.561 CHRONIC PAIN OF RIGHT KNEE: ICD-10-CM

## 2019-09-09 DIAGNOSIS — G89.29 CHRONIC PAIN OF RIGHT KNEE: ICD-10-CM

## 2019-09-09 DIAGNOSIS — R53.1 WEAKNESS: ICD-10-CM

## 2019-09-09 DIAGNOSIS — M25.552 LEFT HIP PAIN: Primary | ICD-10-CM

## 2019-09-09 DIAGNOSIS — R26.2 DIFFICULTY WALKING: ICD-10-CM

## 2019-09-09 PROCEDURE — 97110 THERAPEUTIC EXERCISES: CPT

## 2019-09-09 NOTE — THERAPY DISCHARGE NOTE
Outpatient Physical Therapy Ortho Treatment Note/Discharge Summary  [facility]     Patient Name: Zoila Delgado  : 1960  MRN: [id number]  Today's Date: 2019      Visit Date: 2019    Visit Dx:    ICD-10-CM ICD-9-CM   1. Left hip pain M25.552 719.45   2. Chronic pain of right knee M25.561 719.46    G89.29 338.29   3. Difficulty walking R26.2 719.7   4. Weakness R53.1 780.79       Patient Active Problem List   Diagnosis   • Patellofemoral pain syndrome   • Knee crepitus   • Chondromalacia, knee   • Gastroesophageal reflux disease without esophagitis   • Monsivais's esophagus without dysplasia   • Primary osteoarthritis of right knee   • Constipation   • Gastroparesis        Past Medical History:   Diagnosis Date   • Ankle sprain    • Cervical disc disorder    • Diverticular disease    • Diverticulitis    • Dyspepsia    • Gastritis    • GERD (gastroesophageal reflux disease)    • HH (hiatus hernia)    • Hyperlipidemia    • Renal cyst     left   • Rotator cuff syndrome         Past Surgical History:   Procedure Laterality Date   • COLONOSCOPY  2015    balbina mallory   • ENDOMETRIAL ABLATION     • ENDOSCOPY  2015    HH, Z-line irregular, 42 cm. fromincisors, chronic inflammation   • ENDOSCOPY N/A 2019    Procedure: ESOPHAGOGASTRODUODENOSCOPY WITH COLD BIOPSIES;  Surgeon: Archana Arias MD;  [facility]: [facility] ENDOSCOPY;  Service: Gastroenterology   • TONSILECTOMY, ADENOIDECTOMY, BILATERAL MYRINGOTOMY AND TUBES         PT Ortho     Row Name 19 0900       MMT Right Lower Ext    Rt Hip Extension MMT, Gross Movement  (5/5) normal  -RS    Rt Hip ABduction MMT, Gross Movement  (5/5) normal  -RS    Rt Knee Extension MMT, Gross Movement  (5/5) normal  -RS    Rt Knee Flexion MMT, Gross Movement  (5/5) normal  -RS    Rt Ankle Plantarflexion MMT, Gross Movement  (5/5) normal  -RS    Rt Ankle Dorsiflexion MMT, Gross Movement  (5/5) normal  -RS       MMT Left Lower Ext    Lt Hip Extension MMT,  Gross Movement  (5/5) normal  -RS    Lt Hip ABduction MMT, Gross Movement  (5/5) normal  -RS    Lt Knee Extension MMT, Gross Movement  (5/5) normal  -RS    Lt Knee Flexion MMT, Gross Movement  (5/5) normal  -RS    Lt Ankle Plantarflexion MMT, Gross Movement  (5/5) normal  -RS    Lt Ankle Dorsiflexion MMT, Gross Movement  (5/5) normal  -RS      User Key  (r) = Recorded By, (t) = Taken By, (c) = Cosigned By    Initials Name Provider Type    RS Maria Ines Carty, PT Physical Therapist                              OP Exercises     Row Name 09/09/19 0900             Subjective Comments    Subjective Comments  Pt reports feeling a l;ot better, thinks she can be done with PT, [place].  -RS         Subjective Pain    Able to rate subjective pain?  yes  -RS      Pre-Treatment Pain Level  0  -RS         Total Minutes    98228 - PT Therapeutic Exercise Minutes  33  -RS         Exercise 1    Exercise Name 1  eliptical  -RS      Cueing 1  Verbal  -RS      Reps 1  --  -RS      Time 1  5 min  -RS         Exercise 2    Exercise Name 2  B side plank  -RS      Cueing 2  Verbal  -RS      Sets 2  3  -RS      Reps 2  10s ea  -RS         Exercise 3    Exercise Name 3  hip abd iso into wall  -RS      Cueing 3  Verbal  -RS      Reps 3  5 B  -RS      Time 3  10s  -RS         Exercise 4    Exercise Name 4  bridge w/ HS curl on swiss ball  -RS      Cueing 4  Verbal  -RS      Sets 4  2  -RS      Reps 4  10  -RS      Time 4  5s  -RS      Additional Comments  legs straight, maintain bridge roll in & out, then return to table  -RS         Exercise 6    Exercise Name 6  coppenhagen plank  -RS      Cueing 6  Verbal  -RS      Sets 6  2  -RS      Reps 6  2 B  -RS      Time 6  10s  -RS         Exercise 7    Exercise Name 7  banded side steps  -RS      Cueing 7  Verbal  -RS      Reps 7  3 laps at ballet bar  -RS      Time 7  GTB  -RS         Exercise 15    Exercise Name 15  gastroc stretch, from step  -RS      Cueing 15   Verbal;Demo  -RS      Reps 15  3  -RS      Time 15  20 s  -RS         Exercise 16    Exercise Name 16  monster walk with green theraband  -RS      Reps 16  3 laps 8' each forward & backward  -RS        User Key  (r) = Recorded By, (t) = Taken By, (c) = Cosigned By    Initials Name Provider Type    RS Maria Ines Carty PT Physical Therapist                         PT OP Goals     Row Name 09/09/19 1000          PT Short Term Goals    STG Date to Achieve  07/30/19  -RS     STG 1  Patient will be independent with initial HEP.  -RS     STG 1 Progress  Met  -RS     STG 2  The pt will report 50% reduction in pain during walking/ cycling for improved recreational activity performance.   -RS     STG 2 Progress  Met  -RS        Long Term Goals    LTG Date to Achieve  09/08/19  -RS     LTG 1  Patient will be independent with progressive HEP for long term management of current condition.  -RS     LTG 1 Progress  Met  -RS     LTG 2  Patient will score >/70/80 on the LEFS to indicate improved perceived performance with ADLs.  -RS     LTG 2 Progress  Met  -RS     LTG 3  The pt will navigate a full flight of stairs reciprocally with pain no greater than 2/10 for improved community navigation.  -RS     LTG 3 Progress  Met  -RS     LTG 4  The pt will demonstrate L hip and LLE strength to 5/5 for improved funcitonal strength.  -RS     LTG 4 Progress  Met  -RS     LTG 5  The pt ezequiel return to premorbid gym routine with  and group fitness classes for return to [facility].  -RS     LTG 5 Progress  Met  -RS       User Key  (r) = Recorded By, (t) = Taken By, (c) = Cosigned By    Initials Name Provider Type    Maria Ines Cordova PT Physical Therapist          Therapy Education  Education Details: DC plan, HEP, receoomendations for gym              Time Calculation:   Start Time: 1000  Stop Time: 1035  Time Calculation (min): 35 min  Therapy Charges for Today     Code Description Service Date Service Provider Modifiers Qty     [id number]  PT THER PROC EA 15 MIN 9/9/2019 Maria Ines Carty, PT GP 2                OP PT Discharge Summary  Date of Discharge: 09/09/19  Reason for Discharge: All goals achieved  Outcomes Achieved: Able to achieve all goals within established timeline  Discharge Destination: Home with home program  Discharge Instructions/Additional Comments: Ms. Delgado is discharged from skilled PT after 7 sessions for hip pain. She reports return to all premorbid function with little to no pain. She is able to sit in the car up to 2 hours without pain and walks/hikes/ works out at the gym 6 days per week without increased pain. She demonstrates improved LE strength as well as decreased TTP compared to start of PT. She has met all functional goals. SHe is appropriate for and agreeable to DC/ Reviewed DC plan and HEP with good understanding.       Maria Ines Carty, PT  9/9/2019

## 2019-09-12 ENCOUNTER — APPOINTMENT (OUTPATIENT)
Dept: PHYSICAL THERAPY | Facility: HOSPITAL | Age: 59
End: 2019-09-12

## 2019-11-13 ENCOUNTER — OFFICE VISIT (OUTPATIENT)
Dept: GASTROENTEROLOGY | Facility: CLINIC | Age: 59
End: 2019-11-13

## 2019-11-13 VITALS
BODY MASS INDEX: 21.02 KG/M2 | TEMPERATURE: 98.2 F | DIASTOLIC BLOOD PRESSURE: 82 MMHG | HEIGHT: 66 IN | SYSTOLIC BLOOD PRESSURE: 130 MMHG | WEIGHT: 130.8 LBS

## 2019-11-13 DIAGNOSIS — K59.09 OTHER CONSTIPATION: ICD-10-CM

## 2019-11-13 DIAGNOSIS — R20.2 NUMBNESS AND TINGLING OF UPPER AND LOWER EXTREMITIES OF BOTH SIDES: ICD-10-CM

## 2019-11-13 DIAGNOSIS — R20.0 NUMBNESS AND TINGLING OF UPPER AND LOWER EXTREMITIES OF BOTH SIDES: ICD-10-CM

## 2019-11-13 DIAGNOSIS — R10.13 EPIGASTRIC PAIN: Primary | ICD-10-CM

## 2019-11-13 PROCEDURE — 99214 OFFICE O/P EST MOD 30 MIN: CPT | Performed by: INTERNAL MEDICINE

## 2019-11-13 NOTE — PROGRESS NOTES
Subjective   Chief Complaint   Patient presents with   • Abdominal Pain& bloating   • Heartburn       Zoila Delgado is a  59 y.o. female here for a follow up visit for abdominal pain and bloating, heartburn.     Symptoms seem to be progressively worsening over the past year.  She complains of bloating.  Intolerance of multiple foods which cause worsening heartburn.  She moved her omeprazole to the evening and she still has issues.  She is avoiding a lot of foods as a result.  She reports that she often times has to sit up to fall asleep at night and then wakes in the middle the night with a lot of bowel noise and discomfort.  There is no cramping.  She definitely feels better when she has a bowel movement.  She is traveling more frequently because she is been retired for about a year.  She goes out of town she becomes significantly constipated which impairs her to the point that she can enjoy her vacation.  She takes 2 fiber twice daily.  She is eating a high-fiber diet and she is very active.  She reports that at night she has numbness and tingling of her upper and lower extremities associated with the abdominal discomfort    Patient was last seen in May 2019 for regular surveillance endoscopy due to history of Monsivais's esophagus.  She had 1 cm salmon-colored mucosa as well as a small hiatal hernia.  Biopsies did not show intestinal metaplasia.  Mild chronic inflammation was seen.  She is on daily omeprazole.  Intestinal metaplasia was seen on her EGD in 2015.  HPI  Past Medical History:   Diagnosis Date   • Ankle sprain    • Cervical disc disorder    • Diverticular disease    • Diverticulitis    • Dyspepsia    • Gastritis    • GERD (gastroesophageal reflux disease)    • HH (hiatus hernia)    • Hyperlipidemia    • Renal cyst     left   • Rotator cuff syndrome      Past Surgical History:   Procedure Laterality Date   • COLONOSCOPY  11/30/2015    balbina mallory   • ENDOMETRIAL ABLATION     • ENDOSCOPY  11/30/2015    MARCELLA  Z-line irregular, 42 cm. fromincisors, chronic inflammation   • ENDOSCOPY N/A 5/7/2019    Monsivais's, HH   • TONSILECTOMY, ADENOIDECTOMY, BILATERAL MYRINGOTOMY AND TUBES         Current Outpatient Medications:   •  Calcium Carbonate (CALCIUM CHEW) 500 MG chewable tablet, Chew 1 tablet every 6 (six) hours as needed., Disp: , Rfl:   •  Inulin (FIBER CHOICE PO), Take  by mouth., Disp: , Rfl:   •  Magnesium 100 MG tablet, Take 1 tablet/day by mouth Daily., Disp: , Rfl:   •  Multiple Vitamins-Calcium (VIACTIV MULTI-VITAMIN PO), Take  by mouth., Disp: , Rfl:   •  Multiple Vitamins-Minerals (QC MULTI-MORENO PO), Take  by mouth., Disp: , Rfl:   •  Omega-3 Fatty Acids (FISH OIL) 1000 MG capsule capsule, Take  by mouth daily with breakfast., Disp: , Rfl:   •  omeprazole (PriLOSEC) 20 MG capsule, Take 20 mg by mouth daily., Disp: , Rfl:   •  vitamin C (ASCORBIC ACID) 500 MG tablet, Take 500 mg by mouth 2 (Two) Times a Day., Disp: , Rfl:   PRN Meds:.  No Known Allergies  Social History     Socioeconomic History   • Marital status: Single     Spouse name: Not on file   • Number of children: Not on file   • Years of education: Not on file   • Highest education level: Not on file   Tobacco Use   • Smoking status: Never Smoker   • Smokeless tobacco: Never Used   Substance and Sexual Activity   • Alcohol use: No   • Drug use: No   • Sexual activity: Defer     Family History   Problem Relation Age of Onset   • No Known Problems Mother    • No Known Problems Father      Review of Systems   Constitutional: Negative for appetite change and unexpected weight change.   Gastrointestinal: Positive for abdominal distention, abdominal pain and constipation.   Psychiatric/Behavioral: Positive for sleep disturbance.   All other systems reviewed and are negative.    Vitals:    11/13/19 1352   BP: 130/82   Temp: 98.2 °F (36.8 °C)         11/13/19  1352   Weight: 59.3 kg (130 lb 12.8 oz)       Objective   Physical Exam   Constitutional: She appears  well-developed and well-nourished.   HENT:   Head: Normocephalic and atraumatic.   Eyes: No scleral icterus.   Abdominal: Soft. She exhibits no distension and no mass. There is no tenderness.   Neurological: She is alert.   Skin: Skin is warm and dry.   Psychiatric: She has a normal mood and affect.     No images are attached to the encounter.    Assessment/Plan   Diagnoses and all orders for this visit:    Epigastric pain  -     CBC & Differential    Other constipation  -     TSH  -     Basic Metabolic Panel  -     Calcium    Numbness and tingling of upper and lower extremities of both sides  -     Vitamin B12  -     Basic Metabolic Panel      Plan:  · Given her symptoms, we will check some labs today including her calcium level and B12  · She definitely exhibits some symptoms of constipation-we will start a fiber supplement daily.  Recommend using MiraLAX when she travels.  She will contact me in a few weeks and let me know how she is doing if she is not improving.  May need to consider stronger medication if no significant improvement.  · I suspect that her heartburn is worse related to her lower GI issues-if she does not continue to improve we will consider alternative PPI.  She had a fairly normal EGD earlier this year.

## 2019-11-14 LAB
BASOPHILS # BLD AUTO: (no result) 10*3/UL
BASOPHILS # BLD MANUAL: 0 10*3/MM3 (ref 0–0.2)
BASOPHILS NFR BLD MANUAL: 0 % (ref 0–1.5)
BUN SERPL-MCNC: 16 MG/DL (ref 8–23)
BUN/CREAT SERPL: 20.5 (ref 7–25)
CALCIUM SERPL-MCNC: 9.7 MG/DL (ref 8.6–10.5)
CHLORIDE SERPL-SCNC: 101 MMOL/L (ref 98–107)
CO2 SERPL-SCNC: 26.2 MMOL/L (ref 22–29)
CREAT SERPL-MCNC: 0.78 MG/DL (ref 0.57–1)
DIFFERENTIAL COMMENT: NORMAL
EOSINOPHIL # BLD AUTO: (no result) 10*3/UL
EOSINOPHIL # BLD MANUAL: 0.07 10*3/MM3 (ref 0–0.4)
EOSINOPHIL NFR BLD AUTO: (no result) %
EOSINOPHIL NFR BLD MANUAL: 1 % (ref 0.3–6.2)
ERYTHROCYTE [DISTWIDTH] IN BLOOD BY AUTOMATED COUNT: 11.9 % (ref 12.3–15.4)
GLUCOSE SERPL-MCNC: 107 MG/DL (ref 65–99)
HCT VFR BLD AUTO: 43.9 % (ref 34–46.6)
HGB BLD-MCNC: 13.9 G/DL (ref 12–15.9)
LYMPHOCYTES # BLD AUTO: (no result) 10*3/UL
LYMPHOCYTES # BLD MANUAL: 1.97 10*3/MM3 (ref 0.7–3.1)
LYMPHOCYTES NFR BLD AUTO: (no result) %
LYMPHOCYTES NFR BLD MANUAL: 30 % (ref 19.6–45.3)
MCH RBC QN AUTO: 31.9 PG (ref 26.6–33)
MCHC RBC AUTO-ENTMCNC: 31.7 G/DL (ref 31.5–35.7)
MCV RBC AUTO: 100.7 FL (ref 79–97)
MONOCYTES # BLD MANUAL: 0.59 10*3/MM3 (ref 0.1–0.9)
MONOCYTES NFR BLD AUTO: (no result) %
MONOCYTES NFR BLD MANUAL: 9 % (ref 5–12)
NEUTROPHILS # BLD MANUAL: 3.94 10*3/MM3 (ref 1.7–7)
NEUTROPHILS NFR BLD AUTO: (no result) %
NEUTROPHILS NFR BLD MANUAL: 60 % (ref 42.7–76)
PLATELET # BLD AUTO: 245 10*3/MM3 (ref 140–450)
PLATELET BLD QL SMEAR: NORMAL
POTASSIUM SERPL-SCNC: 4.2 MMOL/L (ref 3.5–5.2)
RBC # BLD AUTO: 4.36 10*6/MM3 (ref 3.77–5.28)
RBC MORPH BLD: NORMAL
SODIUM SERPL-SCNC: 142 MMOL/L (ref 136–145)
TSH SERPL DL<=0.005 MIU/L-ACNC: 2.87 UIU/ML (ref 0.27–4.2)
VIT B12 SERPL-MCNC: 753 PG/ML (ref 211–946)
WBC # BLD AUTO: 6.56 10*3/MM3 (ref 3.4–10.8)

## 2019-11-19 ENCOUNTER — TELEPHONE (OUTPATIENT)
Dept: GASTROENTEROLOGY | Facility: CLINIC | Age: 59
End: 2019-11-19

## 2019-11-19 NOTE — TELEPHONE ENCOUNTER
Called pt and advised per Dr Arias that her recent labs show normal thyroid , b12, electrolytes and blood counts. Pt verb understanding.

## 2019-11-19 NOTE — TELEPHONE ENCOUNTER
----- Message from Archana Arias MD sent at 11/18/2019  7:32 PM EST -----  Recent labs show normal thyroid, B12, electrolytes and blood counts

## 2020-03-31 ENCOUNTER — CLINICAL SUPPORT (OUTPATIENT)
Dept: ORTHOPEDIC SURGERY | Facility: CLINIC | Age: 60
End: 2020-03-31

## 2020-03-31 DIAGNOSIS — M17.11 PRIMARY OSTEOARTHRITIS OF RIGHT KNEE: ICD-10-CM

## 2020-03-31 DIAGNOSIS — M70.62 TROCHANTERIC BURSITIS OF LEFT HIP: ICD-10-CM

## 2020-03-31 DIAGNOSIS — R52 PAIN: Primary | ICD-10-CM

## 2020-03-31 PROCEDURE — 20610 DRAIN/INJ JOINT/BURSA W/O US: CPT | Performed by: ORTHOPAEDIC SURGERY

## 2020-03-31 RX ORDER — TRIAMCINOLONE ACETONIDE 40 MG/ML
80 INJECTION, SUSPENSION INTRA-ARTICULAR; INTRAMUSCULAR
Status: COMPLETED | OUTPATIENT
Start: 2020-03-31 | End: 2020-03-31

## 2020-03-31 RX ORDER — LIDOCAINE HYDROCHLORIDE 10 MG/ML
8 INJECTION, SOLUTION EPIDURAL; INFILTRATION; INTRACAUDAL; PERINEURAL
Status: COMPLETED | OUTPATIENT
Start: 2020-03-31 | End: 2020-03-31

## 2020-03-31 RX ORDER — LIDOCAINE HYDROCHLORIDE 10 MG/ML
4 INJECTION, SOLUTION EPIDURAL; INFILTRATION; INTRACAUDAL; PERINEURAL
Status: COMPLETED | OUTPATIENT
Start: 2020-03-31 | End: 2020-03-31

## 2020-03-31 RX ADMIN — TRIAMCINOLONE ACETONIDE 80 MG: 40 INJECTION, SUSPENSION INTRA-ARTICULAR; INTRAMUSCULAR at 08:35

## 2020-03-31 RX ADMIN — LIDOCAINE HYDROCHLORIDE 8 ML: 10 INJECTION, SOLUTION EPIDURAL; INFILTRATION; INTRACAUDAL; PERINEURAL at 08:32

## 2020-03-31 RX ADMIN — LIDOCAINE HYDROCHLORIDE 4 ML: 10 INJECTION, SOLUTION EPIDURAL; INFILTRATION; INTRACAUDAL; PERINEURAL at 08:35

## 2020-03-31 RX ADMIN — TRIAMCINOLONE ACETONIDE 80 MG: 40 INJECTION, SUSPENSION INTRA-ARTICULAR; INTRAMUSCULAR at 08:32

## 2020-03-31 NOTE — PROGRESS NOTES
Large Joint Arthrocentesis: R knee  Date/Time: 3/31/2020 8:32 AM  Consent given by: patient  Site marked: site marked  Timeout: Immediately prior to procedure a time out was called to verify the correct patient, procedure, equipment, support staff and site/side marked as required   Supporting Documentation  Indications: pain   Procedure Details  Location: knee - R knee  Preparation: Patient was prepped and draped in the usual sterile fashion  Needle size: 22 G  Approach: superior  Medications administered: 8 mL lidocaine PF 1% 1 %; 80 mg triamcinolone acetonide 40 MG/ML  Patient tolerance: patient tolerated the procedure well with no immediate complications    Large Joint Arthrocentesis: L hip joint  Date/Time: 3/31/2020 8:35 AM  Consent given by: patient  Site marked: site marked  Timeout: Immediately prior to procedure a time out was called to verify the correct patient, procedure, equipment, support staff and site/side marked as required   Supporting Documentation  Indications: pain   Procedure Details  Location: hip - L hip joint  Preparation: Patient was prepped and draped in the usual sterile fashion  Needle size: 22 G  Approach: superior (lateral)  Medications administered: 4 mL lidocaine PF 1% 1 %; 80 mg triamcinolone acetonide 40 MG/ML  Patient tolerance: patient tolerated the procedure well with no immediate complications        Cc: Right knee DJD, left hip trochanteric bursitis    Patient presents to clinic today for right knee intra-articular cortisone injection in left hip trochanteric bursa injection. I explained details of injections as well as risks, benefits and alternatives with the patient today, had all questions answered, wished to proceed with injections. Patient was instructed to watch for signs or symptoms of infection including redness, swelling, warmth to the touch, or significant increased pain and to contact our office immediately if any of these issues were noted.

## 2020-07-01 ENCOUNTER — APPOINTMENT (OUTPATIENT)
Dept: WOMENS IMAGING | Facility: HOSPITAL | Age: 60
End: 2020-07-01

## 2020-07-01 PROCEDURE — 77067 SCR MAMMO BI INCL CAD: CPT | Performed by: RADIOLOGY

## 2020-07-01 PROCEDURE — 77063 BREAST TOMOSYNTHESIS BI: CPT | Performed by: RADIOLOGY

## 2020-07-02 ENCOUNTER — CLINICAL SUPPORT (OUTPATIENT)
Dept: ORTHOPEDIC SURGERY | Facility: CLINIC | Age: 60
End: 2020-07-02

## 2020-07-02 VITALS — BODY MASS INDEX: 20.4 KG/M2 | WEIGHT: 130 LBS | HEIGHT: 67 IN

## 2020-07-02 DIAGNOSIS — M22.2X1 PATELLOFEMORAL PAIN SYNDROME OF RIGHT KNEE: Primary | ICD-10-CM

## 2020-07-02 DIAGNOSIS — M17.11 PRIMARY OSTEOARTHRITIS OF RIGHT KNEE: ICD-10-CM

## 2020-07-02 PROCEDURE — 20610 DRAIN/INJ JOINT/BURSA W/O US: CPT | Performed by: ORTHOPAEDIC SURGERY

## 2020-07-02 RX ORDER — LIDOCAINE HYDROCHLORIDE 10 MG/ML
8 INJECTION, SOLUTION EPIDURAL; INFILTRATION; INTRACAUDAL; PERINEURAL
Status: COMPLETED | OUTPATIENT
Start: 2020-07-02 | End: 2020-07-02

## 2020-07-02 RX ORDER — TRIAMCINOLONE ACETONIDE 40 MG/ML
80 INJECTION, SUSPENSION INTRA-ARTICULAR; INTRAMUSCULAR
Status: COMPLETED | OUTPATIENT
Start: 2020-07-02 | End: 2020-07-02

## 2020-07-02 RX ADMIN — LIDOCAINE HYDROCHLORIDE 8 ML: 10 INJECTION, SOLUTION EPIDURAL; INFILTRATION; INTRACAUDAL; PERINEURAL at 12:57

## 2020-07-02 RX ADMIN — TRIAMCINOLONE ACETONIDE 80 MG: 40 INJECTION, SUSPENSION INTRA-ARTICULAR; INTRAMUSCULAR at 12:57

## 2020-07-02 NOTE — PROGRESS NOTES
Cc: Right knee DJD    Patient presents to clinic today for right knee intra-articular cortisone injection(s). I explained details of injections as well as risks, benefits and alternatives with the patient today, had all questions answered, wished to proceed with injections. Patient was instructed to watch for signs or symptoms of infection including redness, swelling, warmth to the touch, or significant increased pain and to contact our office immediately if any of these issues were noted.    Large Joint Arthrocentesis: R knee  Date/Time: 7/2/2020 12:57 PM  Consent given by: patient  Site marked: site marked  Timeout: Immediately prior to procedure a time out was called to verify the correct patient, procedure, equipment, support staff and site/side marked as required   Supporting Documentation  Indications: pain   Procedure Details  Location: knee - R knee  Preparation: Patient was prepped and draped in the usual sterile fashion  Needle size: 22 G  Approach: superior (LATERAL)  Medications administered: 80 mg triamcinolone acetonide 40 MG/ML; 8 mL lidocaine PF 1% 1 %  Patient tolerance: patient tolerated the procedure well with no immediate complications

## 2020-09-24 ENCOUNTER — CLINICAL SUPPORT (OUTPATIENT)
Dept: ORTHOPEDIC SURGERY | Facility: CLINIC | Age: 60
End: 2020-09-24

## 2020-09-24 VITALS — BODY MASS INDEX: 20.4 KG/M2 | HEIGHT: 67 IN | WEIGHT: 130 LBS

## 2020-09-24 DIAGNOSIS — M17.11 PRIMARY OSTEOARTHRITIS OF RIGHT KNEE: Primary | ICD-10-CM

## 2020-09-24 PROCEDURE — 20610 DRAIN/INJ JOINT/BURSA W/O US: CPT | Performed by: ORTHOPAEDIC SURGERY

## 2020-09-24 RX ORDER — LIDOCAINE HYDROCHLORIDE 10 MG/ML
8 INJECTION, SOLUTION EPIDURAL; INFILTRATION; INTRACAUDAL; PERINEURAL
Status: COMPLETED | OUTPATIENT
Start: 2020-09-24 | End: 2020-09-24

## 2020-09-24 RX ORDER — TRIAMCINOLONE ACETONIDE 40 MG/ML
80 INJECTION, SUSPENSION INTRA-ARTICULAR; INTRAMUSCULAR
Status: COMPLETED | OUTPATIENT
Start: 2020-09-24 | End: 2020-09-24

## 2020-09-24 RX ADMIN — LIDOCAINE HYDROCHLORIDE 8 ML: 10 INJECTION, SOLUTION EPIDURAL; INFILTRATION; INTRACAUDAL; PERINEURAL at 13:59

## 2020-09-24 RX ADMIN — TRIAMCINOLONE ACETONIDE 80 MG: 40 INJECTION, SUSPENSION INTRA-ARTICULAR; INTRAMUSCULAR at 13:59

## 2020-09-24 NOTE — PROGRESS NOTES
Procedure   Large Joint Arthrocentesis: R knee  Date/Time: 9/24/2020 1:59 PM  Consent given by: patient  Site marked: site marked  Timeout: Immediately prior to procedure a time out was called to verify the correct patient, procedure, equipment, support staff and site/side marked as required   Supporting Documentation  Indications: pain   Procedure Details  Location: knee - R knee  Preparation: Patient was prepped and draped in the usual sterile fashion  Needle size: 22 G  Approach: superior (LATERAL)  Medications administered: 8 mL lidocaine PF 1% 1 %; 80 mg triamcinolone acetonide 40 MG/ML  Patient tolerance: patient tolerated the procedure well with no immediate complications            Cc: Right knee DJD    Patient presents to clinic today for right knee intra-articular cortisone injection(s). I explained details of injections as well as risks, benefits and alternatives with the patient today, had all questions answered, wished to proceed with injections. Patient was instructed to watch for signs or symptoms of infection including redness, swelling, warmth to the touch, or significant increased pain and to contact our office immediately if any of these issues were noted.

## 2021-03-03 ENCOUNTER — IMMUNIZATION (OUTPATIENT)
Dept: VACCINE CLINIC | Facility: HOSPITAL | Age: 61
End: 2021-03-03

## 2021-03-03 PROCEDURE — 0001A: CPT | Performed by: INTERNAL MEDICINE

## 2021-03-03 PROCEDURE — 91300 HC SARSCOV02 VAC 30MCG/0.3ML IM: CPT | Performed by: INTERNAL MEDICINE

## 2021-03-18 ENCOUNTER — CLINICAL SUPPORT (OUTPATIENT)
Dept: ORTHOPEDIC SURGERY | Facility: CLINIC | Age: 61
End: 2021-03-18

## 2021-03-18 VITALS — HEIGHT: 67 IN | BODY MASS INDEX: 20.4 KG/M2 | WEIGHT: 130 LBS

## 2021-03-18 DIAGNOSIS — M17.11 PRIMARY OSTEOARTHRITIS OF RIGHT KNEE: Primary | ICD-10-CM

## 2021-03-18 PROCEDURE — 20610 DRAIN/INJ JOINT/BURSA W/O US: CPT | Performed by: ORTHOPAEDIC SURGERY

## 2021-03-18 RX ORDER — LIDOCAINE HYDROCHLORIDE 10 MG/ML
8 INJECTION, SOLUTION EPIDURAL; INFILTRATION; INTRACAUDAL; PERINEURAL
Status: DISCONTINUED | OUTPATIENT
Start: 2021-03-18 | End: 2021-03-18 | Stop reason: HOSPADM

## 2021-03-18 RX ORDER — TRIAMCINOLONE ACETONIDE 40 MG/ML
80 INJECTION, SUSPENSION INTRA-ARTICULAR; INTRAMUSCULAR
Status: DISCONTINUED | OUTPATIENT
Start: 2021-03-18 | End: 2021-03-18 | Stop reason: HOSPADM

## 2021-03-18 RX ADMIN — LIDOCAINE HYDROCHLORIDE 8 ML: 10 INJECTION, SOLUTION EPIDURAL; INFILTRATION; INTRACAUDAL; PERINEURAL at 15:38

## 2021-03-18 RX ADMIN — TRIAMCINOLONE ACETONIDE 80 MG: 40 INJECTION, SUSPENSION INTRA-ARTICULAR; INTRAMUSCULAR at 15:38

## 2021-03-18 NOTE — PROGRESS NOTES
Procedure   Large Joint Arthrocentesis: R knee  Date/Time: 3/18/2021 3:38 PM  Consent given by: patient  Site marked: site marked  Timeout: Immediately prior to procedure a time out was called to verify the correct patient, procedure, equipment, support staff and site/side marked as required   Supporting Documentation  Indications: pain   Procedure Details  Location: knee - R knee  Preparation: Patient was prepped and draped in the usual sterile fashion  Needle size: 22 G  Approach: superior (LATERAL)  Medications administered: 80 mg triamcinolone acetonide 40 MG/ML; 8 mL lidocaine PF 1% 1 %  Patient tolerance: patient tolerated the procedure well with no immediate complications              Cc: Right knee DJD    Patient presents to clinic today for right knee intra-articular cortisone injection(s). I explained details of injections as well as risks, benefits and alternatives with the patient today, had all questions answered, wished to proceed with injections. Patient was instructed to watch for signs or symptoms of infection including redness, swelling, warmth to the touch, or significant increased pain and to contact our office immediately if any of these issues were noted.

## 2021-03-24 ENCOUNTER — IMMUNIZATION (OUTPATIENT)
Dept: VACCINE CLINIC | Facility: HOSPITAL | Age: 61
End: 2021-03-24

## 2021-03-24 PROCEDURE — 91300 HC SARSCOV02 VAC 30MCG/0.3ML IM: CPT | Performed by: INTERNAL MEDICINE

## 2021-03-24 PROCEDURE — 0002A: CPT | Performed by: INTERNAL MEDICINE

## 2021-05-18 ENCOUNTER — OFFICE VISIT (OUTPATIENT)
Dept: CARDIOLOGY | Facility: CLINIC | Age: 61
End: 2021-05-18

## 2021-05-18 VITALS
HEIGHT: 67 IN | BODY MASS INDEX: 20.7 KG/M2 | WEIGHT: 131.9 LBS | HEART RATE: 68 BPM | DIASTOLIC BLOOD PRESSURE: 82 MMHG | SYSTOLIC BLOOD PRESSURE: 122 MMHG

## 2021-05-18 DIAGNOSIS — E78.5 ELEVATED LIPIDS: ICD-10-CM

## 2021-05-18 DIAGNOSIS — R00.2 PALPITATIONS: Primary | ICD-10-CM

## 2021-05-18 DIAGNOSIS — R03.0 ELEVATED BLOOD PRESSURE READING WITHOUT DIAGNOSIS OF HYPERTENSION: ICD-10-CM

## 2021-05-18 DIAGNOSIS — R07.89 CHEST PAIN, ATYPICAL: ICD-10-CM

## 2021-05-18 PROCEDURE — 99204 OFFICE O/P NEW MOD 45 MIN: CPT | Performed by: INTERNAL MEDICINE

## 2021-05-18 PROCEDURE — 93000 ELECTROCARDIOGRAM COMPLETE: CPT | Performed by: INTERNAL MEDICINE

## 2021-05-18 RX ORDER — CHLORAL HYDRATE 500 MG
CAPSULE ORAL DAILY
COMMUNITY
Start: 2021-04-01 | End: 2022-11-03

## 2021-05-18 RX ORDER — UBIDECARENONE 30 MG
CAPSULE ORAL DAILY
COMMUNITY
Start: 2021-02-01

## 2021-05-18 NOTE — PROGRESS NOTES
Date of Office Visit: 21  Encounter Provider: Marty Alvarez MD  Place of Service: [facility] CARDIOLOGY  Patient Name: Zoila Delgado  :1960    Chief Complaint   Patient presents with   • Establish Care   :     HPI:     Ms. Delgado is 61 y.o. and presents today for the evaluation of palpitations and atypical chest pain.    She is a very healthy woman; she has never had a diagnosis of hypertension or diabetes. She has no family history of heart disease in her first degree relatives. She has not smoked. She has had mildly elevated LDL, but her HDL is high so she's not been on a statin. She's very physically active.    She reports intermittent palpitations that are brief and feel like a flip flop; they especially occur in the evenings at rest. She has not had sustained symptoms or tachycardia. She denies lightheadedness or syncope. She has noted that her BP has been mildly elevated over the last two years as well, 130s/80s. She has a lot of GI symptoms, and sometimes wakes up at night with chest tightness. She has not had exertional chest pain or dyspnea. She denies leg swelling or orthopnea.     Past Medical History:   Diagnosis Date   • Ankle sprain    • Monsivais esophagus    • Cervical disc disorder    • Diverticular disease    • Diverticulitis    • Dyspepsia    • Gastritis    • GERD (gastroesophageal reflux disease)    • HH (hiatus hernia)    • Hyperlipidemia    • Perirectal abscess    • Renal cyst     left   • Rotator cuff syndrome      Past Surgical History:   Procedure Laterality Date   • COLONOSCOPY  2015    tics,nibh   • ENDOMETRIAL ABLATION     • ENDOSCOPY  2015    HH, Z-line irregular, 42 cm. fromincisors, chronic inflammation   • ENDOSCOPY N/A 2019    Monsivais's, HH   • TONSILECTOMY, ADENOIDECTOMY, BILATERAL MYRINGOTOMY AND TUBES       Social History     Socioeconomic History   • Marital status: Single     Spouse name: Not on file   • Number of  "children: Not on file   • Years of education: Not on file   • Highest education level: Not on file   Tobacco Use   • Smoking status: Never Smoker   • Smokeless tobacco: Never Used   Vaping Use   • Vaping Use: Never used   Substance and Sexual Activity   • Alcohol use: No   • Drug use: No   • Sexual activity: Defer       Family History   Problem Relation Age of Onset   • No Known Problems Mother    • No Known Problems Father    • Breast cancer Sister    • Stroke Maternal Grandmother        Review of Systems   Constitutional: Positive for malaise/fatigue.   Cardiovascular: Positive for chest pain and palpitations.   Gastrointestinal: Positive for bloating, abdominal pain, change in bowel habit, constipation, heartburn and hemorrhoids.   All other systems reviewed and are negative.      No Known Allergies      Current Outpatient Medications:   •  Calcium Carbonate (CALCIUM CHEW) 500 MG chewable tablet, Chew 1 tablet every 6 (six) hours as needed., Disp: , Rfl:   •  Inulin (FIBER CHOICE PO), Take  by mouth., Disp: , Rfl:   •  Magnesium 100 MG tablet, Take 1 tablet/day by mouth Daily., Disp: , Rfl:   •  Multiple Vitamins-Minerals (QC MULTI-MORENO PO), Take  by mouth., Disp: , Rfl:   •  Omega-3 Fatty Acids (fish oil) 1000 MG capsule capsule, Daily., Disp: , Rfl:   •  omeprazole (PriLOSEC) 20 MG capsule, Take 20 mg by mouth daily., Disp: , Rfl:   •  Potassium Gluconate 550 MG tablet, Daily., Disp: , Rfl:   •  vitamin C (ASCORBIC ACID) 500 MG tablet, Take 500 mg by mouth 2 (Two) Times a Day., Disp: , Rfl:       Objective:     Vitals:    05/18/21 1122 05/18/21 1202   BP: 138/80 122/82   BP Location: Left arm    Pulse: 68    Weight: 59.8 kg (131 lb 14.4 oz)    Height: 168.9 cm (66.5\")      Body mass index is 20.97 kg/m².    Vitals reviewed.   Constitutional:       Appearance: Healthy appearance. Well-developed and not in distress.   Eyes:      Conjunctiva/sclera: Conjunctivae normal.      Pupils: Pupils are equal, round, and " reactive to light.   HENT:      Head: Normocephalic.      Nose: Nose normal.         Comments: Masked  Neck:      Vascular: No JVD. JVD normal.      Lymphadenopathy: No cervical adenopathy.   Pulmonary:      Effort: Pulmonary effort is normal.      Breath sounds: Normal breath sounds.   Cardiovascular:      Normal rate. Regular rhythm.      Murmurs: There is no murmur.   Pulses:     Intact distal pulses.   Edema:     Peripheral edema absent.   Abdominal:      Palpations: Abdomen is soft.      Tenderness: There is no abdominal tenderness.   Musculoskeletal: Normal range of motion.      Cervical back: Normal range of motion. Skin:     General: Skin is warm and dry.      Findings: No rash.   Neurological:      General: No focal deficit present.      Mental Status: Alert and oriented to person, place, and time.      Cranial Nerves: No cranial nerve deficit.   Psychiatric:         Behavior: Behavior normal.         Thought Content: Thought content normal.         Judgment: Judgment normal.         ECG 12 Lead    Date/Time: 5/18/2021 12:34 PM  Performed by: Marty Alvarez MD  Authorized by: Marty Alvarez MD   Comparison: compared with previous ECG   Similar to previous ECG  Rhythm: sinus rhythm  Conduction: conduction normal  ST Segments: ST segments normal  T Waves: T waves normal  QRS axis: normal  Other: no other findings    Clinical impression: normal ECG            Assessment:       Diagnosis Plan   1. Palpitations  Adult Transthoracic Echo Complete W/ Cont if Necessary Per Protocol    Treadmill Stress Test   2. Chest pain, atypical  Adult Transthoracic Echo Complete W/ Cont if Necessary Per Protocol    Treadmill Stress Test   3. Elevated lipids     4. Elevated blood pressure reading without diagnosis of hypertension            Plan:       1. Her palpitations sound most consistent with benign premature ventricular contractions. I'll check an echo; as long as that's normal, I don't get too stressed about these. I did  recommend that she switch from magnesium stearate to magnesium oxide, 400-800mg daily.   I don't feel she needs a Holter.    2. Her pain is nocturnal and occurs when lying flat. I suspect it's due to a GI cause.  However, her LDL is elevated (157, HDL 62).  I will get a treadmill stress test. She may benefit from CACS in the future.    3.  I rechecked her BP and got 122/82, which is borderline elevated. It's been 130s/80s at other times.  She may eventually need a tiny dose of an antihypertensive. She is already doing everything from a lifestyle standpoint (she is very fit and eats well and is active).    Sincerely,       Marty Alvarez MD

## 2021-06-17 ENCOUNTER — OFFICE VISIT (OUTPATIENT)
Dept: ORTHOPEDIC SURGERY | Facility: CLINIC | Age: 61
End: 2021-06-17

## 2021-06-17 VITALS — WEIGHT: 131 LBS | BODY MASS INDEX: 20.56 KG/M2 | HEIGHT: 67 IN

## 2021-06-17 DIAGNOSIS — M25.561 CHRONIC PAIN OF RIGHT KNEE: Primary | ICD-10-CM

## 2021-06-17 DIAGNOSIS — M19.042 ARTHRITIS OF FINGER OF LEFT HAND: ICD-10-CM

## 2021-06-17 DIAGNOSIS — M17.11 PRIMARY OSTEOARTHRITIS OF RIGHT KNEE: ICD-10-CM

## 2021-06-17 DIAGNOSIS — M19.041 ARTHRITIS OF FINGER OF RIGHT HAND: ICD-10-CM

## 2021-06-17 DIAGNOSIS — G89.29 CHRONIC PAIN OF RIGHT KNEE: Primary | ICD-10-CM

## 2021-06-17 PROCEDURE — 73562 X-RAY EXAM OF KNEE 3: CPT | Performed by: ORTHOPAEDIC SURGERY

## 2021-06-17 PROCEDURE — 20610 DRAIN/INJ JOINT/BURSA W/O US: CPT | Performed by: ORTHOPAEDIC SURGERY

## 2021-06-17 PROCEDURE — 99213 OFFICE O/P EST LOW 20 MIN: CPT | Performed by: ORTHOPAEDIC SURGERY

## 2021-06-17 RX ADMIN — LIDOCAINE HYDROCHLORIDE 8 ML: 10 INJECTION, SOLUTION EPIDURAL; INFILTRATION; INTRACAUDAL; PERINEURAL at 14:48

## 2021-06-17 RX ADMIN — TRIAMCINOLONE ACETONIDE 80 MG: 40 INJECTION, SUSPENSION INTRA-ARTICULAR; INTRAMUSCULAR at 14:48

## 2021-06-17 NOTE — PROGRESS NOTES
Subjective:     Patient ID: Zoila Delgado is a 61 y.o. female.    Chief Complaint: Right knee pain, follow-up   Last cortisone injection right knee 3/18/2021    History of Present Illness  Zoila Delgado returns to clinic today for evaluation of right knee pain status post cortisone injection. The injection provided 70% relief persisting for 8 weeks. She has some intermittent knee pain and swelling. Patient reports some potentially diminishing returns with cortisone injections. The pain is localized to the anterolateral and medial with radiation into the tibia. Her pain is rated 4/10 in severity today and is aching and sharp in quality.  The pain is aggravated by stairs, squatting, and kneeling but alleviated by rest and injections. Denies swelling, tingling, or numbness. Denies injury. She reports a new problem of right hand long finger pain without injury. Denies triggering or locking. Pain is localized along the right long finger MCP joint.        Social History     Occupational History   • Occupation: RETIRED    Tobacco Use   • Smoking status: Never Smoker   • Smokeless tobacco: Never Used   Vaping Use   • Vaping Use: Never used   Substance and Sexual Activity   • Alcohol use: No   • Drug use: No   • Sexual activity: Defer      Past Medical History:   Diagnosis Date   • Ankle sprain    • Monsivais esophagus    • Cervical disc disorder    • Diverticular disease    • Diverticulitis    • Dyspepsia    • Gastritis    • GERD (gastroesophageal reflux disease)    • HH (hiatus hernia)    • Hyperlipidemia    • Perirectal abscess    • Renal cyst     left   • Rotator cuff syndrome      Past Surgical History:   Procedure Laterality Date   • COLONOSCOPY  11/30/2015    tics,nibh   • ENDOMETRIAL ABLATION     • ENDOSCOPY  11/30/2015    HH, Z-line irregular, 42 cm. fromincisors, chronic inflammation   • ENDOSCOPY N/A 5/7/2019    Monsivais's, HH   • TONSILECTOMY, ADENOIDECTOMY, BILATERAL MYRINGOTOMY AND TUBES         Family History  "  Problem Relation Age of Onset   • No Known Problems Mother    • No Known Problems Father    • Breast cancer Sister    • Stroke Maternal Grandmother          Review of Systems        Objective:  Vitals:    06/17/21 1332   Weight: 59.4 kg (131 lb)   Height: 168.9 cm (66.5\")         06/17/21  1332   Weight: 59.4 kg (131 lb)     Body mass index is 20.83 kg/m².  General: No acute distress.  Resp: normal respiratory effort  Skin: no rashes or wounds; normal turgor  Psych: mood and affect appropriate; recent and remote memory intact          Ortho Exam     Right Knee-    ROM 2-135 degrees  4+/5 on flexion  4+/5 on extension  Maximal tenderness lateral patellofemoral joint    Effusion- moderate   Stable opening on varus and valgus stress at 0 and 30  Active patellar compression test- positive     Positive sensation light tough all distributions symmetric to contralateral side  Brisk cap refill all digits  1+ dorsalis pedis pulse          Imaging:  Right Knee X-Ray  Indication: Pain    AP, Lateral, and Erda views    Findings:  No fracture  No bony lesion  Normal soft tissues  Moderate medial and lateral compartment joint space narrowing with some evidence reactive osteophyte formation, advanced patellofemoral compartment joint space narrowing with bone-on-bone articulation of the lateral patellofemoral joint.    Compared to prior office x-rays      Assessment:        1. Chronic pain of right knee    2. Primary osteoarthritis of right knee           Plan:  Large Joint Arthrocentesis: R knee  Date/Time: 6/17/2021 2:48 PM  Consent given by: patient  Site marked: site marked  Timeout: Immediately prior to procedure a time out was called to verify the correct patient, procedure, equipment, support staff and site/side marked as required   Supporting Documentation  Indications: pain   Procedure Details  Location: knee - R knee  Preparation: Patient was prepped and draped in the usual sterile fashion  Needle size: 22 " G  Approach: SUPERIOR LATERAL.  Medications administered: 8 mL lidocaine PF 1% 1 %; 80 mg triamcinolone acetonide 40 MG/ML  Patient tolerance: patient tolerated the procedure well with no immediate complications                  1. Discussed treatment options at length with patient at today's visit including bracing, oral anti-inflammatories, cortisone injections, visco injections, or total knee arthroplasty.   2. Patient would like to proceed with cortisone injection today to the right knee. Recommended limited use of affected extremity for the next 24 hours to only essential activites other than work on general active and passive motion. Recommended supplementing with ice and soft tissue massage. Discussed with patient that they should see results in 5-7 days, if no improvement in 5-6 weeks I have asked them to call the office to review other options. Patient should call office immediately if they notice redness, warmth, fevers, chills, or residual numbness or tingling for greater than 6 hours after injection.   3. Submit for visco injection of the right knee.  4. Referred for right long finger pain evaluation with hand surgery  5. Follow-up with visco injections once available.      Zoila Delgado was in agreement with plan and had all questions answered.     Orders:  Orders Placed This Encounter   Procedures   • XR Knee 3+ View With [place] Right       Medications:  No orders of the defined types were placed in this encounter.      Followup:  Return for visco injection once available.    Diagnoses and all orders for this visit:    1. Chronic pain of right knee (Primary)  -     XR Knee 3+ View With [place] Right    2. Primary osteoarthritis of right knee        SCRIBE ATTESTATION:  Alex HAN, attest that all medical record entries for this patient were documented by me acting as a medical scribe for Mariano Yee MD.    PROVIDER ATTESTATION:  Mariano HAN MD, personally performed the services  described in this documentation. All medical record entries made by the scribe were at my direction and in my presence. I have reviewed the chart and discharge instructions and agree that the record reflects my personal performance and is accurate and complete.  Mariano Yee MD.    Electronically signed: Mariano Yee MD 6/17/2021 14:37 EDT       Dictated utilizing Dragon dictation

## 2021-06-21 RX ORDER — TRIAMCINOLONE ACETONIDE 40 MG/ML
80 INJECTION, SUSPENSION INTRA-ARTICULAR; INTRAMUSCULAR
Status: COMPLETED | OUTPATIENT
Start: 2021-06-17 | End: 2021-06-17

## 2021-06-21 RX ORDER — LIDOCAINE HYDROCHLORIDE 10 MG/ML
8 INJECTION, SOLUTION EPIDURAL; INFILTRATION; INTRACAUDAL; PERINEURAL
Status: COMPLETED | OUTPATIENT
Start: 2021-06-17 | End: 2021-06-17

## 2021-07-06 ENCOUNTER — HOSPITAL ENCOUNTER (OUTPATIENT)
Dept: CARDIOLOGY | Facility: HOSPITAL | Age: 61
Discharge: HOME | End: 2021-07-06

## 2021-07-06 VITALS
BODY MASS INDEX: 20.9 KG/M2 | DIASTOLIC BLOOD PRESSURE: 84 MMHG | WEIGHT: 130.07 LBS | SYSTOLIC BLOOD PRESSURE: 128 MMHG | HEART RATE: 68 BPM | HEIGHT: 66 IN

## 2021-07-06 DIAGNOSIS — R07.89 CHEST PAIN, ATYPICAL: ICD-10-CM

## 2021-07-06 DIAGNOSIS — R00.2 PALPITATIONS: ICD-10-CM

## 2021-07-06 LAB
BH CV ECHO MEAS - AO MAX PG: 5 MMHG
BH CV ECHO MEAS - AO MEAN PG (FULL): 2 MMHG
BH CV ECHO MEAS - AO MEAN PG: 3 MMHG
BH CV ECHO MEAS - AO ROOT AREA (BSA CORRECTED): 1.9
BH CV ECHO MEAS - AO ROOT AREA: 8 CM^2
BH CV ECHO MEAS - AO ROOT DIAM: 3.2 CM
BH CV ECHO MEAS - AO V2 MAX: 112 CM/SEC
BH CV ECHO MEAS - AO V2 MEAN: 77.8 CM/SEC
BH CV ECHO MEAS - AO V2 VTI: 23.9 CM
BH CV ECHO MEAS - ASC AORTA: 3 CM
BH CV ECHO MEAS - AVA(I,A): 2 CM^2
BH CV ECHO MEAS - AVA(I,D): 2 CM^2
BH CV ECHO MEAS - BSA(HAYCOCK): 1.7 M^2
BH CV ECHO MEAS - BSA: 1.7 M^2
BH CV ECHO MEAS - BZI_BMI: 20.9 KILOGRAMS/M^2
BH CV ECHO MEAS - BZI_METRIC_HEIGHT: 168 CM
BH CV ECHO MEAS - BZI_METRIC_WEIGHT: 59 KG
BH CV ECHO MEAS - EDV(CUBED): 78.4 ML
BH CV ECHO MEAS - EDV(MOD-SP2): 91.9 ML
BH CV ECHO MEAS - EDV(MOD-SP4): 97.9 ML
BH CV ECHO MEAS - EDV(TEICH): 82.2 ML
BH CV ECHO MEAS - EF(CUBED): 74 %
BH CV ECHO MEAS - EF(MOD-BP): 59.9 %
BH CV ECHO MEAS - EF(MOD-SP2): 64.9 %
BH CV ECHO MEAS - EF(MOD-SP4): 60.3 %
BH CV ECHO MEAS - EF(TEICH): 66.2 %
BH CV ECHO MEAS - ESV(CUBED): 20.3 ML
BH CV ECHO MEAS - ESV(MOD-SP2): 32.3 ML
BH CV ECHO MEAS - ESV(MOD-SP4): 38.9 ML
BH CV ECHO MEAS - ESV(TEICH): 27.8 ML
BH CV ECHO MEAS - FS: 36.2 %
BH CV ECHO MEAS - IVS/LVPW: 1.1
BH CV ECHO MEAS - IVSD: 0.84 CM
BH CV ECHO MEAS - LAT PEAK E' VEL: 9.3 CM/SEC
BH CV ECHO MEAS - LV DIASTOLIC VOL/BSA (35-75): 58.7 ML/M^2
BH CV ECHO MEAS - LV MASS(C)D: 105.3 GRAMS
BH CV ECHO MEAS - LV MASS(C)DI: 63.1 GRAMS/M^2
BH CV ECHO MEAS - LV MAX PG: 2.6 MMHG
BH CV ECHO MEAS - LV MEAN PG: 1 MMHG
BH CV ECHO MEAS - LV SYSTOLIC VOL/BSA (12-30): 23.3 ML/M^2
BH CV ECHO MEAS - LV V1 MAX: 80.1 CM/SEC
BH CV ECHO MEAS - LV V1 MEAN: 46.1 CM/SEC
BH CV ECHO MEAS - LV V1 VTI: 15.4 CM
BH CV ECHO MEAS - LVIDD: 4.3 CM
BH CV ECHO MEAS - LVIDS: 2.7 CM
BH CV ECHO MEAS - LVLD AP2: 6.9 CM
BH CV ECHO MEAS - LVLD AP4: 7.2 CM
BH CV ECHO MEAS - LVLS AP2: 5.4 CM
BH CV ECHO MEAS - LVLS AP4: 6.6 CM
BH CV ECHO MEAS - LVOT AREA (M): 3.1 CM^2
BH CV ECHO MEAS - LVOT AREA: 3.1 CM^2
BH CV ECHO MEAS - LVOT DIAM: 2 CM
BH CV ECHO MEAS - LVPWD: 0.77 CM
BH CV ECHO MEAS - MED PEAK E' VEL: 8.9 CM/SEC
BH CV ECHO MEAS - MV A DUR: 153 SEC
BH CV ECHO MEAS - MV A MAX VEL: 67.7 CM/SEC
BH CV ECHO MEAS - MV DEC SLOPE: 225 CM/SEC^2
BH CV ECHO MEAS - MV DEC TIME: 206 SEC
BH CV ECHO MEAS - MV E MAX VEL: 56.1 CM/SEC
BH CV ECHO MEAS - MV E/A: 0.83
BH CV ECHO MEAS - MV MEAN PG: 1 MMHG
BH CV ECHO MEAS - MV P1/2T MAX VEL: 68.4 CM/SEC
BH CV ECHO MEAS - MV P1/2T: 89 MSEC
BH CV ECHO MEAS - MV V2 MEAN: 42.8 CM/SEC
BH CV ECHO MEAS - MV V2 VTI: 19.8 CM
BH CV ECHO MEAS - MVA P1/2T LCG: 3.2 CM^2
BH CV ECHO MEAS - MVA(P1/2T): 2.5 CM^2
BH CV ECHO MEAS - MVA(VTI): 2.4 CM^2
BH CV ECHO MEAS - PA ACC TIME: 0.08 SEC
BH CV ECHO MEAS - PA MAX PG: 2.5 MMHG
BH CV ECHO MEAS - PA PR(ACCEL): 44.4 MMHG
BH CV ECHO MEAS - PA V2 MAX: 79.8 CM/SEC
BH CV ECHO MEAS - QP/QS: 1.8
BH CV ECHO MEAS - RAP SYSTOLE: 8 MMHG
BH CV ECHO MEAS - RV MEAN PG: 1 MMHG
BH CV ECHO MEAS - RV V1 MEAN: 43.1 CM/SEC
BH CV ECHO MEAS - RV V1 VTI: 13.1 CM
BH CV ECHO MEAS - RVOT AREA: 6.6 CM^2
BH CV ECHO MEAS - RVOT DIAM: 2.9 CM
BH CV ECHO MEAS - RVSP: 23 MMHG
BH CV ECHO MEAS - SI(AO): 115.2 ML/M^2
BH CV ECHO MEAS - SI(CUBED): 34.8 ML/M^2
BH CV ECHO MEAS - SI(LVOT): 29 ML/M^2
BH CV ECHO MEAS - SI(MOD-SP2): 35.7 ML/M^2
BH CV ECHO MEAS - SI(MOD-SP4): 35.4 ML/M^2
BH CV ECHO MEAS - SI(TEICH): 32.6 ML/M^2
BH CV ECHO MEAS - SV(AO): 192.2 ML
BH CV ECHO MEAS - SV(CUBED): 58.1 ML
BH CV ECHO MEAS - SV(LVOT): 48.4 ML
BH CV ECHO MEAS - SV(MOD-SP2): 59.6 ML
BH CV ECHO MEAS - SV(MOD-SP4): 59 ML
BH CV ECHO MEAS - SV(RVOT): 86.5 ML
BH CV ECHO MEAS - SV(TEICH): 54.4 ML
BH CV ECHO MEAS - TAPSE (>1.6): 2.7 CM
BH CV ECHO MEAS - TR MAX PG: 15 MMHG
BH CV ECHO MEAS - TR MAX VEL: 195 CM/SEC
BH CV ECHO MEASUREMENTS AVERAGE E/E' RATIO: 6.16
BH CV STRESS BP STAGE 1: NORMAL
BH CV STRESS BP STAGE 2: NORMAL
BH CV STRESS BP STAGE 3: NORMAL
BH CV STRESS DURATION MIN STAGE 1: 3
BH CV STRESS DURATION MIN STAGE 2: 3
BH CV STRESS DURATION MIN STAGE 3: 2
BH CV STRESS DURATION SEC STAGE 1: 0
BH CV STRESS DURATION SEC STAGE 2: 0
BH CV STRESS DURATION SEC STAGE 3: 3
BH CV STRESS GRADE STAGE 1: 10
BH CV STRESS GRADE STAGE 2: 12
BH CV STRESS GRADE STAGE 3: 14
BH CV STRESS HR STAGE 1: 103
BH CV STRESS HR STAGE 2: 133
BH CV STRESS HR STAGE 3: 160
BH CV STRESS METS STAGE 1: 5
BH CV STRESS METS STAGE 2: 7.5
BH CV STRESS METS STAGE 3: 10
BH CV STRESS PROTOCOL 1: NORMAL
BH CV STRESS RECOVERY BP: NORMAL MMHG
BH CV STRESS RECOVERY HR: 80 BPM
BH CV STRESS SPEED STAGE 1: 1.7
BH CV STRESS SPEED STAGE 2: 2.5
BH CV STRESS SPEED STAGE 3: 3.4
BH CV STRESS STAGE 1: 1
BH CV STRESS STAGE 2: 2
BH CV STRESS STAGE 3: 3
BH CV XLRA - RV BASE: 3.5 CM
BH CV XLRA - RV LENGTH: 6.6 CM
BH CV XLRA - RV MID: 2.3 CM
BH CV XLRA - TDI S': 15.1 CM/SEC
LEFT ATRIUM VOLUME INDEX: 13.4 ML/M2
LV EF 2D ECHO EST: 60 %
MAXIMAL PREDICTED HEART RATE: 157 BPM
MAXIMAL PREDICTED HEART RATE: 157 BPM
PERCENT MAX PREDICTED HR: 101.91 %
STRESS BASELINE BP: NORMAL MMHG
STRESS O2 SAT REST: 97 %
STRESS PERCENT HR: 120 %
STRESS POST ESTIMATED WORKLOAD: 10.2 METS
STRESS POST EXERCISE DUR MIN: 8 MIN
STRESS POST EXERCISE DUR SEC: 3 SEC
STRESS POST O2 SAT PEAK: 97 %
STRESS POST PEAK BP: NORMAL MMHG
STRESS POST PEAK HR: 160 BPM
STRESS TARGET HR: 133 BPM
STRESS TARGET HR: 133 BPM

## 2021-07-06 PROCEDURE — 93017 CV STRESS TEST TRACING ONLY: CPT

## 2021-07-06 PROCEDURE — 93306 TTE W/DOPPLER COMPLETE: CPT

## 2021-07-06 PROCEDURE — 93018 CV STRESS TEST I&R ONLY: CPT | Performed by: INTERNAL MEDICINE

## 2021-07-06 PROCEDURE — 25010000002 PERFLUTREN (DEFINITY) 8.476 MG IN SODIUM CHLORIDE (PF) 0.9 % 10 ML INJECTION: Performed by: INTERNAL MEDICINE

## 2021-07-06 PROCEDURE — 93016 CV STRESS TEST SUPVJ ONLY: CPT | Performed by: INTERNAL MEDICINE

## 2021-07-06 PROCEDURE — 93306 TTE W/DOPPLER COMPLETE: CPT | Performed by: INTERNAL MEDICINE

## 2021-07-06 RX ADMIN — SODIUM CHLORIDE 2 ML: 9 INJECTION INTRAMUSCULAR; INTRAVENOUS; SUBCUTANEOUS at 14:27

## 2021-07-07 ENCOUNTER — APPOINTMENT (OUTPATIENT)
Dept: WOMENS IMAGING | Facility: HOSPITAL | Age: 61
End: 2021-07-07

## 2021-07-07 ENCOUNTER — TELEPHONE (OUTPATIENT)
Dept: CARDIOLOGY | Facility: CLINIC | Age: 61
End: 2021-07-07

## 2021-07-07 PROCEDURE — 77063 BREAST TOMOSYNTHESIS BI: CPT | Performed by: RADIOLOGY

## 2021-07-07 PROCEDURE — 77067 SCR MAMMO BI INCL CAD: CPT | Performed by: RADIOLOGY

## 2021-07-07 NOTE — TELEPHONE ENCOUNTER
----- Message from SYD Uribe sent at 7/7/2021  6:18 AM EDT -----  Please call patient.  Her treadmill stress test was normal.  During the stress test did she have any palpitations?  She was noted to have some occasional PACs and rare PVCs which were benign.    Her echocardiogram showed a strong heart with a normal ejection fraction, grade 1 diastolic dysfunction (impaired relaxation), and trace mitral and tricuspid regurgitation.  Overall, very stable heart we would recommend just really good blood pressure control.  Please call if she continues to have palpitations.

## 2021-07-07 NOTE — TELEPHONE ENCOUNTER
Krystin,    Called and spoke with pt. Went over results and recommendations. She verbalized understanding. She did not have any palpitations during stress test.    Thank you,    Holly Cordoba, RN  Triage [facility]

## 2021-07-08 NOTE — TELEPHONE ENCOUNTER
Noted.  Dr. Alvarez-this is your patient and I was just covering your in basket.  When you return, please let Irasema know if you need patient to follow-up in our office.  Thank you.

## 2021-07-12 NOTE — TELEPHONE ENCOUNTER
I spoke with the patient and reviewed her results.  She did start taking the magnesium.  She still has occasional palpitations but states they are much improved.    Thank you,  Mary Rust RN  [facility]  Triage

## 2021-07-12 NOTE — TELEPHONE ENCOUNTER
Please let her know that I reviewed her results and I consider them all WNL.  I had recommended OTC magnesium at her visit -- did she start that, and if so, did it help with palps?    MAYE

## 2021-11-24 ENCOUNTER — IMMUNIZATION (OUTPATIENT)
Dept: VACCINE CLINIC | Facility: HOSPITAL | Age: 61
End: 2021-11-24

## 2021-11-24 PROCEDURE — 91300 HC SARSCOV02 VAC 30MCG/0.3ML IM: CPT | Performed by: INTERNAL MEDICINE

## 2021-11-24 PROCEDURE — 0004A ADM SARSCOV2 30MCG/0.3ML BOOSTER: CPT | Performed by: INTERNAL MEDICINE

## 2021-12-16 ENCOUNTER — CLINICAL SUPPORT (OUTPATIENT)
Dept: ORTHOPEDIC SURGERY | Facility: CLINIC | Age: 61
End: 2021-12-16

## 2021-12-16 VITALS — BODY MASS INDEX: 20.89 KG/M2 | WEIGHT: 130 LBS | HEIGHT: 66 IN

## 2021-12-16 DIAGNOSIS — M17.11 PRIMARY OSTEOARTHRITIS OF RIGHT KNEE: Primary | ICD-10-CM

## 2021-12-16 DIAGNOSIS — M70.62 TROCHANTERIC BURSITIS OF LEFT HIP: ICD-10-CM

## 2021-12-16 PROCEDURE — 20610 DRAIN/INJ JOINT/BURSA W/O US: CPT | Performed by: ORTHOPAEDIC SURGERY

## 2021-12-16 RX ADMIN — TRIAMCINOLONE ACETONIDE 80 MG: 40 INJECTION, SUSPENSION INTRA-ARTICULAR; INTRAMUSCULAR at 12:44

## 2021-12-16 RX ADMIN — LIDOCAINE HYDROCHLORIDE 4 ML: 10 INJECTION, SOLUTION EPIDURAL; INFILTRATION; INTRACAUDAL; PERINEURAL at 12:45

## 2021-12-16 RX ADMIN — LIDOCAINE HYDROCHLORIDE 8 ML: 10 INJECTION, SOLUTION INFILTRATION; PERINEURAL at 12:44

## 2021-12-16 RX ADMIN — TRIAMCINOLONE ACETONIDE 80 MG: 40 INJECTION, SUSPENSION INTRA-ARTICULAR; INTRAMUSCULAR at 12:45

## 2021-12-16 NOTE — PROGRESS NOTES
Procedure   Large Joint Arthrocentesis: R knee  Date/Time: 12/16/2021 12:44 PM  Consent given by: patient  Site marked: site marked  Timeout: Immediately prior to procedure a time out was called to verify the correct patient, procedure, equipment, support staff and site/side marked as required   Supporting Documentation  Indications: pain   Procedure Details  Location: knee - R knee  Preparation: Patient was prepped and draped in the usual sterile fashion  Needle size: 22 G  Approach: superior (lateral)  Medications administered: 80 mg triamcinolone acetonide 40 MG/ML; 8 mL lidocaine 1 %  Patient tolerance: patient tolerated the procedure well with no immediate complications    Large Joint Arthrocentesis: L greater trochanteric bursa  Date/Time: 12/16/2021 12:45 PM  Consent given by: patient  Site marked: site marked  Timeout: Immediately prior to procedure a time out was called to verify the correct patient, procedure, equipment, support staff and site/side marked as required   Supporting Documentation  Indications: pain   Procedure Details  Location: hip - L greater trochanteric bursa  Preparation: Patient was prepped and draped in the usual sterile fashion  Needle size: 22 G  Approach: lateral  Medications administered: 80 mg triamcinolone acetonide 40 MG/ML; 4 mL lidocaine PF 1% 1 %  Patient tolerance: patient tolerated the procedure well with no immediate complications              Cc: Right knee DJD, left hip trochanteric bursitis    Patient presents to clinic today for right knee intra-articular cortisone injection and left hip trochanteric bursa injection. I explained details of injections as well as risks, benefits and alternatives with the patient today, had all questions answered, wished to proceed with injections. Patient was instructed to watch for signs or symptoms of infection including redness, swelling, warmth to the touch, or significant increased pain and to contact our office immediately if any  of these issues were noted.

## 2021-12-19 RX ORDER — TRIAMCINOLONE ACETONIDE 40 MG/ML
80 INJECTION, SUSPENSION INTRA-ARTICULAR; INTRAMUSCULAR
Status: COMPLETED | OUTPATIENT
Start: 2021-12-16 | End: 2021-12-16

## 2021-12-19 RX ORDER — LIDOCAINE HYDROCHLORIDE 10 MG/ML
4 INJECTION, SOLUTION EPIDURAL; INFILTRATION; INTRACAUDAL; PERINEURAL
Status: COMPLETED | OUTPATIENT
Start: 2021-12-16 | End: 2021-12-16

## 2021-12-19 RX ORDER — LIDOCAINE HYDROCHLORIDE 10 MG/ML
8 INJECTION, SOLUTION INFILTRATION; PERINEURAL
Status: COMPLETED | OUTPATIENT
Start: 2021-12-16 | End: 2021-12-16

## 2022-01-10 DIAGNOSIS — M54.50 LUMBAR PAIN: Primary | ICD-10-CM

## 2022-01-13 ENCOUNTER — OFFICE VISIT (OUTPATIENT)
Dept: SPORTS MEDICINE | Facility: CLINIC | Age: 62
End: 2022-01-13

## 2022-01-13 VITALS
RESPIRATION RATE: 16 BRPM | WEIGHT: 128 LBS | HEART RATE: 104 BPM | SYSTOLIC BLOOD PRESSURE: 115 MMHG | HEIGHT: 66 IN | TEMPERATURE: 97.5 F | BODY MASS INDEX: 20.57 KG/M2 | OXYGEN SATURATION: 99 % | DIASTOLIC BLOOD PRESSURE: 60 MMHG

## 2022-01-13 DIAGNOSIS — M54.42 CHRONIC BILATERAL LOW BACK PAIN WITH BILATERAL SCIATICA: Primary | ICD-10-CM

## 2022-01-13 DIAGNOSIS — M47.816 FACET ARTHROPATHY, LUMBAR: ICD-10-CM

## 2022-01-13 DIAGNOSIS — M51.36 DDD (DEGENERATIVE DISC DISEASE), LUMBAR: ICD-10-CM

## 2022-01-13 DIAGNOSIS — M54.41 CHRONIC BILATERAL LOW BACK PAIN WITH BILATERAL SCIATICA: Primary | ICD-10-CM

## 2022-01-13 DIAGNOSIS — G89.29 CHRONIC BILATERAL LOW BACK PAIN WITH BILATERAL SCIATICA: Primary | ICD-10-CM

## 2022-01-13 DIAGNOSIS — M43.16 SPONDYLOLISTHESIS, LUMBAR REGION: ICD-10-CM

## 2022-01-13 PROCEDURE — 72100 X-RAY EXAM L-S SPINE 2/3 VWS: CPT | Performed by: FAMILY MEDICINE

## 2022-01-13 PROCEDURE — 99214 OFFICE O/P EST MOD 30 MIN: CPT | Performed by: FAMILY MEDICINE

## 2022-01-13 RX ORDER — METHOCARBAMOL 500 MG/1
500 TABLET, FILM COATED ORAL NIGHTLY PRN
Qty: 30 TABLET | Refills: 0 | Status: SHIPPED | OUTPATIENT
Start: 2022-01-13 | End: 2022-10-02 | Stop reason: SDUPTHER

## 2022-01-13 RX ORDER — VITAMIN B COMPLEX
CAPSULE ORAL DAILY
COMMUNITY

## 2022-01-13 NOTE — PROGRESS NOTES
"Zoila is a 61 y.o. year old female presents to [facility]    Chief Complaint   Patient presents with   • Back Pain     Referral from Dr. Yee for eval of LBP, chronic pain no recent direct injuries - pain worsened over the last few years - increased activities make pain worse at times, carrying heavy objects, etc. otherwise she is very active - reports having numbness in her feet at times, no other sxs reported - here today with new x-rays for further evaluation and treatment        History of Present Illness  Chronic.  No known injury.  Waxes and wanes.  She was running but had to stop due to the back pain.  She enjoys gardening.  Was involved with a strength  a few years ago but stopped with the onset of the pandemic.  She thinks that this helped with core stabilization and reducing her back pain.  Was going to chiropractor years ago for regular basis but did not see significant benefit from this.  She associates spasms, primarily along the left lumbar region.  Also associates nighttime numbness of both feet on the ball of her feet.  She is planning to walk 200 miles, 15 miles a day over in [place] in June 2022.    Review of systems negative except for following: MSK positive for back pain.  Neuro positive for numbness in bilateral feet.    I have reviewed the patient's medical, family, and social history in detail and updated the computerized patient record.    /60 (BP Location: Right arm, Patient Position: Sitting, Cuff Size: Adult)   Pulse 104   Temp 97.5 °F (36.4 °C) (Temporal)   Resp 16   Ht 167.6 cm (66\")   Wt 58.1 kg (128 lb)   SpO2 99%   BMI 20.66 kg/m²      Physical Exam    Mask worn thru encounter  Vital signs reviewed.   General: No acute distress.  Eyes: conjunctiva clear; pupils equally round and reactive  ENT: external ears atraumatic  CV: no peripheral edema  Resp: normal respiratory effort, no use of accessory muscles  Skin: no rashes or " wounds; normal turgor  Psych: mood and affect appropriate; recent and remote memory intact  Neuro: sensation to light touch intact; 2+ DTR L4, S1 bilateral    MSK Exam  Lumbar spine demonstrates left paraspinal tenderness.  No somatic dysfunction.  Negative straight leg raise bilateral.  Negative Stinchfield.  Negative MAEVE FADIR.  Positive stork sign positive Trendelenburg on the left.    Lumbar Spine X-Ray  Indication: Pain  Views: AP and lateral    Findings:  No fracture  No bony lesion  Normal soft tissues  Multilevel degenerative disc disease, most notable at L4-5.  There is also corresponding spondylolisthesis of L4 on 5.  Levoscoliosis with apex of the curve at L4.    No prior studies were available for comparison.             Diagnoses and all orders for this visit:    Chronic bilateral low back pain with bilateral sciatica  -     XR Spine Lumbar 2 or 3 View  -     Ambulatory Referral to Physical Therapy  -     methocarbamol (Robaxin) 500 MG tablet; Take 1 tablet by mouth At Night As Needed for Muscle Spasms.    Spondylolisthesis, lumbar region  -     Ambulatory Referral to Physical Therapy  -     methocarbamol (Robaxin) 500 MG tablet; Take 1 tablet by mouth At Night As Needed for Muscle Spasms.    DDD (degenerative disc disease), lumbar  -     Ambulatory Referral to Physical Therapy  -     methocarbamol (Robaxin) 500 MG tablet; Take 1 tablet by mouth At Night As Needed for Muscle Spasms.    Facet arthropathy, lumbar  -     Ambulatory Referral to Physical Therapy  -     methocarbamol (Robaxin) 500 MG tablet; Take 1 tablet by mouth At Night As Needed for Muscle Spasms.    Other orders  -     B Complex Vitamins (vitamin b complex) capsule capsule; Take  by mouth Daily.    Discussed exam, x-ray findings with patient.  She does fit pattern of neuropathic pain secondary to I suspect degenerative disc with foraminal stenosis/facet arthropathy at the L4 level impinging on the L5 nerve root.  Symptoms are more  significant on the left side.  Did discuss management conservatively to start with physical therapy.  Muscle laxer nightly as needed.  Follow-up in 6 weeks.  Consider advanced imaging.      Follow Up   Return in about 6 weeks (around 2/24/2022) for Recheck.  Patient was given instructions and counseling regarding her condition or for health maintenance advice. Please see specific information pulled into the AVS if appropriate.     EMR Dragon/Transcription disclaimer:    Much of this encounter note is an electronic transcription/translation of spoken language to printed text.  The electronic translation of spoken language may permit erroneous, or at times, nonsensical words or phrases to be inadvertently transcribed.  Although I have reviewed the note for such errors some may still exist.

## 2022-01-14 ENCOUNTER — PATIENT ROUNDING (BHMG ONLY) (OUTPATIENT)
Dept: SPORTS MEDICINE | Facility: CLINIC | Age: 62
End: 2022-01-14

## 2022-01-14 NOTE — PROGRESS NOTES
January 14, 2022    A [employer] message has been sent to the patient for PATIENT ROUNDING with [facility]

## 2022-02-14 ENCOUNTER — HOSPITAL ENCOUNTER (OUTPATIENT)
Dept: PHYSICAL THERAPY | Facility: HOSPITAL | Age: 62
Setting detail: THERAPIES SERIES
Discharge: HOME | End: 2022-02-14

## 2022-02-14 DIAGNOSIS — M54.50 CHRONIC LOW BACK PAIN, UNSPECIFIED BACK PAIN LATERALITY, UNSPECIFIED WHETHER SCIATICA PRESENT: Primary | ICD-10-CM

## 2022-02-14 DIAGNOSIS — G89.29 CHRONIC LOW BACK PAIN, UNSPECIFIED BACK PAIN LATERALITY, UNSPECIFIED WHETHER SCIATICA PRESENT: Primary | ICD-10-CM

## 2022-02-14 PROCEDURE — 97110 THERAPEUTIC EXERCISES: CPT | Performed by: PHYSICAL THERAPIST

## 2022-02-14 PROCEDURE — 97161 PT EVAL LOW COMPLEX 20 MIN: CPT | Performed by: PHYSICAL THERAPIST

## 2022-02-14 NOTE — THERAPY EVALUATION
Outpatient Physical Therapy Ortho Initial Evaluation  [facility]     Patient Name: Zoila Delgado  : 1960  MRN: [id number]  Today's Date: 2022      Visit Date: 2022    Patient Active Problem List   Diagnosis   • Patellofemoral pain syndrome   • Knee crepitus   • Chondromalacia, knee   • Gastroesophageal reflux disease without esophagitis   • Monsivais's esophagus without dysplasia   • Primary osteoarthritis of right knee   • Constipation   • Gastroparesis        Past Medical History:   Diagnosis Date   • Ankle sprain    • Monsivais esophagus    • Cervical disc disorder    • Diverticular disease    • Diverticulitis    • Dyspepsia    • Gastritis    • GERD (gastroesophageal reflux disease)    • HH (hiatus hernia)    • Hyperlipidemia    • Perirectal abscess    • Renal cyst     left   • Rotator cuff syndrome         Past Surgical History:   Procedure Laterality Date   • COLONOSCOPY  2015    balbina mallory   • ENDOMETRIAL ABLATION     • ENDOSCOPY  2015    HH, Z-line irregular, 42 cm. fromincisors, chronic inflammation   • ENDOSCOPY N/A 2019    Monsivais's, HH   • TONSILECTOMY, ADENOIDECTOMY, BILATERAL MYRINGOTOMY AND TUBES         Visit Dx:     ICD-10-CM ICD-9-CM   1. Chronic low back pain, unspecified back pain laterality, unspecified whether sciatica present  M54.50 724.2    G89.29 338.29          Patient History     Row Name 22 0900             History    Chief Complaint Muscle tenderness; Numbness; Tightness; Tinglings  -GJ      Type of Pain Back pain  -GJ      Date Current Problem(s) Began --  chronic  -GJ      Brief Description of Current Complaint Ms. Delgado is a 61 y/o active female. She reports chronic LBP, which sometimes results in N/T of bilateral feet, mostly at night. She reports some spasms of her L sided low back, intermittent in nature.  She does have R knee pain for which she is having injections as well as L hip pain, for which she receives injections as well.  No  recent treatment. Plain films taken at [facility], see his notes.  She reports a trip to [place] coming up in June in which she will go on 10-15 mile day hikes.  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  She reports difficulty with sit to/from stand transfer dutch after prolonged sitting. She reports difficulty with lifting things and gardening.  Prolonged standing can be aggravating. She denies recent traumas/falls.  -GJ      Previous treatment for THIS PROBLEM --  nothing recently chiro in past (2015)  -GJ      Patient/Caregiver Goals Relieve pain  -GJ      Hand Dominance right-handed  -GJ      Occupation/sports/leisure activities Walking, [employer], gardening, & hiking.  -GJ      What clinical tests have you had for this problem? X-ray  -GJ      Results of Clinical Tests see MD note  -GJ      Are you or can you be pregnant No  -GJ              Pain     Pain Location Back  -GJ      What Performance Factors Make the Current Problem(s) WORSE? standing, sti to stand transition, lifitng, mulching  -GJ      What Performance Factors Make the Current Problem(s) BETTER? change of position  -GJ              Fall Risk Assessment    Any falls in the past year: No  -GJ              Daily Activities    Primary Language English  -GJ      Are you able to read Yes  -GJ      Are you able to write Yes  -GJ      How does patient learn best? Reading; Demonstration  -GJ      Teaching needs identified Home Exercise Program; Management of Condition  -GJ      Patient is concerned about/has problems with Flexibility; Transfers (getting out of a chair, bed); Performing sports, recreation, and play activities; Performing home management (household chores, shopping, care of dependents); Performing job responsibilities/community activities (work, school,  -GJ      Barriers to learning None  -GJ      Pt Participated in POC and Goals Yes   -GJ              Safety    Are you being hurt, hit, or frightened by anyone at home or in your life? No  -GJ      Are you being neglected by a caregiver No  -GJ            User Key  (r) = Recorded By, (t) = Taken By, (c) = Cosigned By    Initials Name Provider Type    Tr Shetty, PT Physical Therapist                 PT Ortho     Row Name 02/14/22 1000       Posture/Observations    Alignment Options Scoliosis; Genu valgus; Lumbar lordosis  -GJ    Scoliosis Mild  -GJ    Lumbar lordosis Decreased  -GJ    Genu valgus Right:; Moderate; Left:; Mild  -GJ       Quarter Clearing    Quarter Clearing Lower Quarter Clearing  -GJ       DTR- Lower Quarter Clearing    Patellar tendon (L2-4) Bilateral:; 2- Normal response  -GJ    Achilles tendon (S1-2) Bilateral:; 2- Normal response  -GJ       Neural Tension Signs- Lower Quarter Clearing    Slump Bilateral:; Negative  -GJ    SLR Bilateral:; Negative  -GJ    Prone knee flexion Bilateral:; Negative  -GJ       Pathological Reflexes- Lower Quarter Clearing    Clonus Bilateral:; Negative  -GJ       Myotomal Screen- Lower Quarter Clearing    Hip flexion (L2) Bilateral:; 4 (Good)  -GJ    Knee extension (L3) Bilateral:; 5 (Normal)  -GJ    Ankle DF (L4) Bilateral:; 5 (Normal)  -GJ    Great toe extension (L5) Bilateral:; 5 (Normal)  -GJ    Ankle PF (S1) Bilateral:; 4+ (Good +)  -GJ    Knee flexion (S2) Bilateral:; 5 (Normal)  -GJ       Lumbar ROM Screen- Lower Quarter Clearing    Lumbar Flexion Impaired  25% impaired, no pain  -GJ    Lumbar Extension Impaired  impaird by 50%, pain at end range  -GJ    Lumbar Lateral Flexion Normal  -GJ    Lumbar Rotation Normal  -GJ       SI/Hip Screen- Lower Quarter Clearing    Parish's/Gianfranco's test Bilateral:; Negative  however noted limited range bilaterally  -GJ    Posterior thigh sheer Bilateral:; Negative  -GJ       Special Tests/Palpation    Special Tests/Palpation Lumbar/SI; Hip  -GJ       Lumbar/SI Special Tests    Thigh Thrust/Posterior  Shear (SI Dysfunction) Bilateral:; Negative  -GJ    S[place] Test (SI Dysfunction) Negative  -GJ       Lumbosacral Palpation    Lumbosacral Palpation? Yes  -GJ    SI --  no pain bilaterally  -GJ    Piriformis Left:; Guarded/taut  -GJ    Erector Spinae (Paraspinals) Bilateral:; Guarded/taut  -GJ       Hip/Thigh Palpation    Hip/Thigh Palpation? Yes  -GJ    Gluteus Medius Left:; Guarded/taut  -GJ       Hip Special Tests    Trendelenberg sign (gluteus medius weakness) Bilateral:; Negative  -GJ    Ely’s test (rectus femoris tightness) Bilateral:; Positive  -GJ    Hip scour test (labral vs hip pathology) Bilateral:; Negative  -GJ       General ROM    GENERAL ROM COMMENTS grossly noted PROM IR bilateral hips to 10 degrees, bilateral knee AROM grossly WFL and equal  -GJ       MMT (Manual Muscle Testing)    Rt Lower Ext Rt Hip ABduction  -GJ    Lt Lower Ext Lt Hip ABduction  -GJ       MMT Right Lower Ext    Rt Hip ABduction MMT, Gross Movement (4+/5) good plus  -GJ       MMT Left Lower Ext    Lt Hip ABduction MMT, Gross Movement (4+/5) good plus  -GJ       Flexibility    Flexibility Tested? Lower Extremity; Upper Extremity  -GJ       Upper Extremity Flexibility    Latissimus Dorsi Bilateral:; Mildly limited; Moderately limited  -GJ       Lower Extremity Flexibility    Hamstrings Bilateral:; WNL; Mildly limited  -GJ    Hip Flexors Bilateral:; Mildly limited; Moderately limited  -GJ    Quadriceps Bilateral:; Moderately limited  -GJ    Hip External Rotators Bilateral:; Moderately limited  -GJ    Hip Internal Rotators Bilateral:; Moderately limited  -GJ    Gastrocnemius Bilateral:; Moderately limited  -GJ       Balance Skills Training    SLS able to bilaterally 5+ seconds  -GJ          User Key  (r) = Recorded By, (t) = Taken By, (c) = Cosigned By    Initials Name Provider Type    GJ Tr Kahn, PT Physical Therapist                            Therapy Education  Education Details: discussed dx, px, poc, discussed  anatomy of the spine  and physiology of healing, discussed realistic expectations and time frames for therapy. Discussed warming up dynamicall (demosntrated for her) prior to walking, discussed stretching regularly dutch after walking, discussed activity modifications and ergonomiic considerations dutch with her volunteer job, gardening, and with caring for her great gill  Given: HEP, Symptoms/condition management, Pain management, Posture/body mechanics, Mobility training  Program: New  How Provided: Verbal, Demonstration, Written  Provided to: Patient  Level of Understanding: Teach back education performed, Verbalized, Demonstrated      PT OP Goals     Row Name 02/14/22 0900          PT Short Term Goals    STG Date to Achieve 03/14/22  -     STG 1 pt. to be I with initial HEP to facilitate self management of their condition  -GJ     STG 1 Progress New  -     STG 2 pt. to be educated in/verbalize understanding of the importance of posture/ergonomics in association with their condition to facilitate self management of their condition  -     STG 2 Progress New  -            Long Term Goals    LTG Date to Achieve 05/06/22  -     LTG 1 pt. 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            Time Calculation    PT Goal Re-Cert Due Date 05/06/22  -           User Key  (r) = Recorded By, (t) = Taken By, (c) = Cosigned By    Initials Name Provider Type    Tr Shetty, PT Physical Therapist                 PT Assessment/Plan     Row Name 02/14/22 1249          PT  Assessment    Functional Limitations Impaired gait; Limitations in community activities; Performance in leisure activities; Performance in work activities  -GJ     Impairments Gait; Impaired flexibility; Impaired muscle endurance; Impaired muscle length; Impaired muscle power; Impaired postural alignment; Joint mobility; Muscle strength; Pain; Poor body mechanics; Posture; Range of motion  -GJ     Assessment Comments Ms. Delgado is a 63 y/o active female. She reports chronic LBP, which sometimes results in N/T of bilateral feet, mostly at night. She reports some spasms of her L sided low back, intermittent in nature.  She does have R knee pain for which she is having injections as well as L hip pain, for which she receives injections as well.  No recent treatment. Plain films taken at [facility], see his notes.  She reports a trip to [place] coming up in June in which she will go on 10-15 mile day hikes.  She walks 4 miles daily, volunteers at [employer] performing gardening tasks (mulching, pulling weeds etc) and enjoys caring for her great niece (3-4 years old).  She reports difficulty with sit to/from stand transfer dutch after prolonged sitting. She reports difficulty with lifting things and gardening.  Prolonged standing can be aggravating. She denies recent traumas/falls.  Ms. Delgado presents, demonstrating scoliosis, R>L genu valgum, mild decrease in flexion/extension of the L spine ROM, limited hip IR ROM, limited bilateral MAEVE’s, shortened hip flexors, hip rotator tissues with overall core/hip girdle weakness.  She demonstrates (-) SLR, Slump testing.  Ms. Delgado demonstrates stable s/s consistent with degenerative changes of her spine which limits her participation in household, leisure, gardening activities    Aggravating/Personal factors affecting recovery include,  but are not limited to, chronicity of condition.  Ms. Delgado may benefit from skilled physical therapy to address the above impairments.  -GJ      Please refer to paper survey for additional self-reported information Yes  -GJ     Rehab Potential Good  -GJ     Patient/caregiver participated in establishment of treatment plan and goals Yes  -GJ     Patient would benefit from skilled therapy intervention Yes  -GJ            PT Plan    PT Frequency 1x/week; 2x/week  -GJ     Predicted Duration of Therapy Intervention (PT) 10 visits  -GJ     Planned CPT's? PT EVAL LOW COMPLEXITY: 87989; PT RE-EVAL: 22713; PT THER PROC EA 15 MIN: 99820; PT THER ACT EA 15 MIN: 42427; PT NEUROMUSC RE-EDUCATION EA 15 MIN: 22851; PT MANUAL THERAPY EA 15 MIN: 07715; PT AQUATIC THERAPY EA 15 MIN: 95306; PT HOT OR COLD PACK TREAT MCARE  -GJ     PT Plan Comments assess response to initial HEP, warm up on Nustep, shoulder ext with TA, lateral stepping, monster walk, HS curls, standing HA/D2 with T band, progress hip girdle/core strengthening as able movving to more dynamic/functiioanl strengthening dutch of posterior chain/transverse plane  -GJ           User Key  (r) = Recorded By, (t) = Taken By, (c) = Cosigned By    Initials Name Provider Type    GJ Tr Kahn, PT Physical Therapist                   OP Exercises     Row Name 02/14/22 1309 02/14/22 1000          Total Minutes    12756 - PT Therapeutic Exercise Minutes 15  -GJ --            Exercise 1    Exercise Name 1 -- PPT  -GJ     Cueing 1 -- Verbal; Auditory  -GJ     Reps 1 -- 15  -GJ     Time 1 -- 5s  -GJ            Exercise 2    Exercise Name 2 -- LTR  -GJ     Cueing 2 -- Verbal; Demo  -GJ     Reps 2 -- 10  -GJ     Time 2 -- 5s  -GJ            Exercise 3    Exercise Name 3 -- piriformis stretch  -GJ     Cueing 3 -- Verbal; Demo  -GJ     Reps 3 -- 3  -GJ     Time 3 -- 20 s  -GJ            Exercise 4    Exercise Name 4 -- seated HS stretch  -GJ     Cueing 4 -- Verbal; Demo  -GJ     Reps 4 -- 3  -GJ     Time 4 -- 20 s  -GJ            Exercise 5    Exercise Name 5 -- lateral doorway stretch (for lats/QL)  -GJ     Cueing 5 -- Verbal;  Demo  -GJ     Reps 5 -- 3  -GJ     Time 5 -- 20 s  -GJ            Exercise 6    Exercise Name 6 -- hip flexor stretch at step  -GJ     Cueing 6 -- Verbal; Demo  -GJ     Reps 6 -- 3  -GJ     Time 6 -- 20 s  -GJ            Exercise 7    Exercise Name 7 -- gastroc stretch at step  -GJ     Cueing 7 -- Verbal; Auditory  -GJ     Reps 7 -- 3  -GJ     Time 7 -- 20 s  -GJ           User Key  (r) = Recorded By, (t) = Taken By, (c) = Cosigned By    Initials Name Provider Type    GJ Tr Kahn, PT Physical Therapist                              Outcome Measure Options: Modifed Owestry  Modified Oswestry  Modified Oswestry Score/Comments: incomplete form      Time Calculation:     Start Time: 1000  Stop Time: 1045  Time Calculation (min): 45 min  Timed Charges  22945 - PT Therapeutic Exercise Minutes: 15  Total Minutes  Timed Charges Total Minutes: 15   Total Minutes: 15     Therapy Charges for Today     Code Description Service Date Service Provider Modifiers Qty    71352642528 HC PT THER PROC EA 15 MIN 2/14/2022 Tr Kahn, PT GP 1    02122433483 HC PT EVAL LOW COMPLEXITY 2 2/14/2022 Tr Kahn, PT GP 1          PT G-Codes  Outcome Measure Options: Modifed Owestry  Modified Oswestry Score/Comments: incomplete form         Tr Kahn, PT  2/14/2022

## 2022-02-16 ENCOUNTER — HOSPITAL ENCOUNTER (OUTPATIENT)
Dept: PHYSICAL THERAPY | Facility: HOSPITAL | Age: 62
Setting detail: THERAPIES SERIES
Discharge: HOME | End: 2022-02-16

## 2022-02-16 DIAGNOSIS — M54.50 CHRONIC LOW BACK PAIN, UNSPECIFIED BACK PAIN LATERALITY, UNSPECIFIED WHETHER SCIATICA PRESENT: Primary | ICD-10-CM

## 2022-02-16 DIAGNOSIS — G89.29 CHRONIC LOW BACK PAIN, UNSPECIFIED BACK PAIN LATERALITY, UNSPECIFIED WHETHER SCIATICA PRESENT: Primary | ICD-10-CM

## 2022-02-16 PROCEDURE — 97110 THERAPEUTIC EXERCISES: CPT | Performed by: PHYSICAL THERAPIST

## 2022-02-16 NOTE — THERAPY TREATMENT NOTE
Outpatient Physical Therapy Ortho Treatment Note  [facility]     Patient Name: Zoila Delgado  : 1960  MRN: [id number]  Today's Date: 2022      Visit Date: 2022    Visit Dx:    ICD-10-CM ICD-9-CM   1. Chronic low back pain, unspecified back pain laterality, unspecified whether sciatica present  M54.50 724.2    G89.29 338.29       Patient Active Problem List   Diagnosis   • Patellofemoral pain syndrome   • Knee crepitus   • Chondromalacia, knee   • Gastroesophageal reflux disease without esophagitis   • Monsivais's esophagus without dysplasia   • Primary osteoarthritis of right knee   • Constipation   • Gastroparesis        Past Medical History:   Diagnosis Date   • Ankle sprain    • Monsivais esophagus    • Cervical disc disorder    • Diverticular disease    • Diverticulitis    • Dyspepsia    • Gastritis    • GERD (gastroesophageal reflux disease)    • HH (hiatus hernia)    • Hyperlipidemia    • Perirectal abscess    • Renal cyst     left   • Rotator cuff syndrome         Past Surgical History:   Procedure Laterality Date   • COLONOSCOPY  2015    balbina mallory   • ENDOMETRIAL ABLATION     • ENDOSCOPY  2015    HH, Z-line irregular, 42 cm. fromincisors, chronic inflammation   • ENDOSCOPY N/A 2019    Monsivais's, HH   • TONSILECTOMY, ADENOIDECTOMY, BILATERAL MYRINGOTOMY AND TUBES                          PT Assessment/Plan     Row Name 22 0945          PT Assessment    Assessment Comments Ms. Delgado returns for her first follow up for her LBP. SHe also reports R Achilles pain which has been bothering her for several weeks. We discussed process of healing, importance of time off and slow re-entry to pre-morbid activity.   We introduced hip girdle/core strengthening activities today and updated HEP accourdingly. Strengthening to be once every other day, stretches daily.  Ms. Delgado continues to be a good candidate for skilled physical therapy  -GJ            PT Plan    PT Plan  Comments assess response to new strengthening exercises, continue to work on hip girdle/core strengtheing, May considder gentle split squats (cautious of R knee pain/OA), AR  -GJ           User Key  (r) = Recorded By, (t) = Taken By, (c) = Cosigned By    Initials Name Provider Type    Tr Shetty, PT Physical Therapist                   OP Exercises     Row Name 02/16/22 0914 02/16/22 0900          Subjective Comments    Subjective Comments -- I'm ok, I have an Achilles thing going on.  -GJ            Total Minutes    87734 - PT Therapeutic Exercise Minutes 43  -GJ --            Exercise 1    Exercise Name 1 -- PPT  -GJ     Cueing 1 -- Verbal; Auditory  -GJ     Reps 1 -- 15  -GJ     Time 1 -- 5s  -GJ            Exercise 2    Exercise Name 2 -- LTR  -GJ     Cueing 2 -- Verbal; Demo  -GJ     Reps 2 -- 10  -GJ     Time 2 -- 5s  -GJ            Exercise 3    Exercise Name 3 -- piriformis stretch  -GJ     Cueing 3 -- Verbal; Demo  -GJ     Reps 3 -- 3  -GJ     Time 3 -- 20 s  -GJ            Exercise 4    Exercise Name 4 -- seated HS stretch  -GJ     Cueing 4 -- Verbal; Demo  -GJ     Reps 4 -- 3  -GJ     Time 4 -- 20 s  -GJ            Exercise 5    Exercise Name 5 -- lateral doorway stretch (for lats/QL)  -GJ     Cueing 5 -- Verbal; Demo  -GJ     Reps 5 -- 3  -GJ     Time 5 -- 20 s  -GJ            Exercise 6    Exercise Name 6 -- hip flexor stretch at step  -GJ     Cueing 6 -- Verbal; Demo  -GJ     Reps 6 -- 3  -GJ     Time 6 -- 20 s  -GJ            Exercise 7    Exercise Name 7 -- gastroc stretch at step  -GJ     Cueing 7 -- Verbal; Auditory  -GJ     Reps 7 -- 3  -GJ     Time 7 -- 20 s  -GJ            Exercise 8    Exercise Name 8 -- Nustep  -GJ     Cueing 8 -- Verbal; Demo  -GJ     Time 8 -- 5 min  -GJ            Exercise 9    Exercise Name 9 -- standing HS curl, B  -GJ     Cueing 9 -- Verbal; Demo  -GJ     Sets 9 -- 2  -GJ     Reps 9 -- 10  -GJ     Time 9 -- 2#  -GJ            Exercise 10    Exercise Name 10 --  lateral walking  -GJ     Cueing 10 -- Verbal  -GJ     Reps 10 -- 3 laps  -GJ     Time 10 -- RTB  -GJ            Exercise 11    Exercise Name 11 -- monster walk, F/B  -GJ     Cueing 11 -- Verbal; Demo  -GJ     Reps 11 -- 3 laps  -GJ     Time 11 -- RTB  -GJ            Exercise 12    Exercise Name 12 -- eccentric heel raises froms step  -GJ     Cueing 12 -- Verbal; Demo  -GJ     Reps 12 -- 15  -GJ     Time 12 -- 5 s on way day, B  -GJ            Exercise 13    Exercise Name 13 -- seated unsupported shoulder HA  -GJ     Cueing 13 -- Verbal; Demo  -GJ     Reps 13 -- 15  -GJ     Time 13 -- RTB, with TA  -GJ           User Key  (r) = Recorded By, (t) = Taken By, (c) = Cosigned By    Initials Name Provider Type    Tr Shetty, PT Physical Therapist                              PT OP Goals     Row Name 02/16/22 0900          PT Short Term Goals    STG Date to Achieve 03/14/22  -GJ     STG 1 pt. to be I with initial HEP to facilitate self management of their condition  -GJ     STG 1 Progress Ongoing  -GJ     STG 2 pt. to be educated in/verbalize understanding of the importance of posture/ergonomics in association with their condition to facilitate self management of their condition  -GJ     STG 2 Progress Ongoing  -GJ            Long Term Goals    LTG Date to Achieve 05/06/22  -GJ     LTG 1 pt. 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 jumping right back into pre-morbid activity level. Discussed 10% rule for progression of activities. Discussed need to strength train to maintain strength and realistic time frames /outcomes of healing.  Strengthening to be once every other day, strettches daily  Given: HEP, Symptoms/condition management, Pain management, Posture/body mechanics, Mobility training  Program: New, Reinforced, Progressed  How Provided: Verbal, Demonstration, Written  Provided to: Patient  Level of Understanding: Teach back education performed, Verbalized, Demonstrated              Time Calculation:   Start Time: 0915  Stop Time: 1000  Time Calculation (min): 45 min  Timed Charges  04024 - PT Therapeutic Exercise Minutes: 43  Total Minutes  Timed Charges Total Minutes: 43   Total Minutes: 43  Therapy Charges for Today     Code Description Service Date Service Provider Modifiers Qty    23508262077 HC PT THER PROC EA 15 MIN 2/16/2022 Tr Kahn, PT GP 3                    Tr Kahn, PT  2/16/2022

## 2022-02-28 ENCOUNTER — HOSPITAL ENCOUNTER (OUTPATIENT)
Dept: PHYSICAL THERAPY | Facility: HOSPITAL | Age: 62
Setting detail: THERAPIES SERIES
Discharge: HOME | End: 2022-02-28

## 2022-02-28 DIAGNOSIS — M54.50 CHRONIC LOW BACK PAIN, UNSPECIFIED BACK PAIN LATERALITY, UNSPECIFIED WHETHER SCIATICA PRESENT: Primary | ICD-10-CM

## 2022-02-28 DIAGNOSIS — G89.29 CHRONIC LOW BACK PAIN, UNSPECIFIED BACK PAIN LATERALITY, UNSPECIFIED WHETHER SCIATICA PRESENT: Primary | ICD-10-CM

## 2022-02-28 PROCEDURE — 97110 THERAPEUTIC EXERCISES: CPT

## 2022-03-02 ENCOUNTER — HOSPITAL ENCOUNTER (OUTPATIENT)
Dept: PHYSICAL THERAPY | Facility: HOSPITAL | Age: 62
Setting detail: THERAPIES SERIES
Discharge: HOME | End: 2022-03-02

## 2022-03-02 DIAGNOSIS — G89.29 CHRONIC LOW BACK PAIN, UNSPECIFIED BACK PAIN LATERALITY, UNSPECIFIED WHETHER SCIATICA PRESENT: Primary | ICD-10-CM

## 2022-03-02 DIAGNOSIS — M54.50 CHRONIC LOW BACK PAIN, UNSPECIFIED BACK PAIN LATERALITY, UNSPECIFIED WHETHER SCIATICA PRESENT: Primary | ICD-10-CM

## 2022-03-02 PROCEDURE — 97110 THERAPEUTIC EXERCISES: CPT | Performed by: PHYSICAL THERAPIST

## 2022-03-02 NOTE — THERAPY TREATMENT NOTE
Outpatient Physical Therapy Ortho Treatment Note  [facility]     Patient Name: Zoila Delgado  : 1960  MRN: [id number]  Today's Date: 3/2/2022      Visit Date: 2022    Visit Dx:    ICD-10-CM ICD-9-CM   1. Chronic low back pain, unspecified back pain laterality, unspecified whether sciatica present  M54.50 724.2    G89.29 338.29       Patient Active Problem List   Diagnosis   • Patellofemoral pain syndrome   • Knee crepitus   • Chondromalacia, knee   • Gastroesophageal reflux disease without esophagitis   • Monsivais's esophagus without dysplasia   • Primary osteoarthritis of right knee   • Constipation   • Gastroparesis        Past Medical History:   Diagnosis Date   • Ankle sprain    • Monsivais esophagus    • Cervical disc disorder    • Diverticular disease    • Diverticulitis    • Dyspepsia    • Gastritis    • GERD (gastroesophageal reflux disease)    • HH (hiatus hernia)    • Hyperlipidemia    • Perirectal abscess    • Renal cyst     left   • Rotator cuff syndrome         Past Surgical History:   Procedure Laterality Date   • COLONOSCOPY  2015    balbina mallory   • ENDOMETRIAL ABLATION     • ENDOSCOPY  2015    HH, Z-line irregular, 42 cm. fromincisors, chronic inflammation   • ENDOSCOPY N/A 2019    Monsivais's, HH   • TONSILECTOMY, ADENOIDECTOMY, BILATERAL MYRINGOTOMY AND TUBES                          PT Assessment/Plan     Row Name 22 1223          PT Assessment    Assessment Comments Ms. Delgado returns, reporting some soreness in her R posterior hip, otherwise her session with the  went well per her report.  We continued to discussed activity modifications and allowing time for rest. We continued to work on hip girdle/core strength with intermittent cues for form.  She was able to perform AR presses without difficulty today. Ms. Delgado continues to be a good candidate for skilled physical therapy.  -GJ            PT Plan    PT Plan Comments work on hip girdle/core  strength, likely keep HEP somewhat simple secondary to tendancies of over doing it, prepare for DC  -GJ           User Key  (r) = Recorded By, (t) = Taken By, (c) = Cosigned By    Initials Name Provider Type    GJ Tr Kahn, PT Physical Therapist                   OP Exercises     Row Name 03/02/22 1051 03/02/22 1000          Subjective Comments    Subjective Comments -- I saw the , she modified my program for me. My hip is much better, My Achilles is somewht better, I put a heel ift in my R shoe (for Achilles this morning). My friend has a chiropractor that says he has a sure fire way to treat the Achilles  -GJ            Total Minutes    69802 - PT Therapeutic Exercise Minutes 42  -GJ --            Exercise 4    Exercise Name 4 -- seated HS stretch  -GJ     Cueing 4 -- Verbal; Demo  -GJ     Reps 4 -- 3  -GJ     Time 4 -- 20 s  -GJ            Exercise 6    Exercise Name 6 -- hip flexor stretch at step  -GJ     Cueing 6 -- Verbal; Demo  -GJ     Reps 6 -- 3  -GJ     Time 6 -- 20 s  -GJ            Exercise 7    Exercise Name 7 -- gastroc stretch at step  -GJ     Cueing 7 -- Verbal; Auditory  -GJ     Reps 7 -- 3  -GJ     Time 7 -- 20 s  -GJ            Exercise 8    Exercise Name 8 -- Nustep  -GJ     Time 8 -- 5 min  -GJ     Additional Comments -- UE/LE  -GJ            Exercise 10    Exercise Name 10 -- lateral walking  -GJ     Cueing 10 -- Verbal  -GJ     Reps 10 -- 4 laps  -GJ     Time 10 -- RTB  -GJ            Exercise 11    Exercise Name 11 -- monster walk, F/B  -GJ     Cueing 11 -- Verbal; Demo  -GJ     Reps 11 -- 4 laps  -GJ     Time 11 -- RTB  -GJ            Exercise 12    Exercise Name 12 -- eccentric heel raises froms step  -GJ     Cueing 12 -- Verbal; Demo  -GJ     Reps 12 -- 15  -GJ     Time 12 -- 5 s on way down, B  -GJ     Additional Comments -- up bilaterally, down on R only  -GJ            Exercise 13    Exercise Name 13 -- Seated HA on swiss ball with one foot off floor  -GJ     Cueing 13 --  Verbal; Demo  -GJ     Reps 13 -- 15  -GJ     Time 13 -- RTB  -GJ            Exercise 14    Exercise Name 14 -- Hip flex/abd/ext, RTB at knees  -GJ     Cueing 14 -- Verbal; Demo  -GJ     Reps 14 -- 10 rounds B  -GJ     Time 14 -- on blue foam, cues for TA  -GJ     Additional Comments -- cues for neutral pelvis, avoid dropping/anterior tilts  -GJ            Exercise 15    Exercise Name 15 -- Split squat, small range  -GJ     Cueing 15 -- Verbal; Demo  -GJ     Reps 15 -- 5 B  -GJ     Additional Comments -- noted signifcant popping of R knee when RLE is posterior (attempted pattelar mobilization medial/lateral without change)  -GJ            Exercise 16    Exercise Name 16 -- AR  -GJ     Cueing 16 -- Verbal; Demo  -GJ     Sets 16 -- 2  -GJ     Reps 16 -- 10  -GJ     Time 16 -- YTB  -GJ     Additional Comments -- no pain today  -GJ           User Key  (r) = Recorded By, (t) = Taken By, (c) = Cosigned By    Initials Name Provider Type    Tr Shetty W, PT Physical Therapist                              PT OP Goals     Row Name 03/02/22 1000          PT Short Term Goals    STG Date to Achieve 03/14/22  -GJ     STG 1 pt. to be I with initial HEP to facilitate self management of their condition  -GJ     STG 1 Progress Met  -GJ     STG 2 pt. to be educated in/verbalize understanding of the importance of posture/ergonomics in association with their condition to facilitate self management of their condition  -GJ     STG 2 Progress Met  -GJ            Long Term Goals    LTG Date to Achieve 05/06/22  -GJ     LTG 1 pt. 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) without exacerbation of her LBP to faciltiate ease with community activities  -     LTG 3 Progress Ongoing  -           User Key  (r) = Recorded By, (t) = Taken By, (c) = Cosigned By    Initials Name Provider Type    Tr Shetty, PT Physical Therapist                Therapy Education  Education Details: reviewed activity modifcations, need for recovery after work outs  Given: HEP, Symptoms/condition management, Pain management, Posture/body mechanics, Mobility training  Program: Reinforced, Progressed, Modified  How Provided: Verbal, Demonstration  Provided to: Patient  Level of Understanding: Teach back education performed, Verbalized, Demonstrated              Time Calculation:   Start Time: 1045  Stop Time: 1127  Time Calculation (min): 42 min  Timed Charges  96447 - PT Therapeutic Exercise Minutes: 42  Total Minutes  Timed Charges Total Minutes: 42   Total Minutes: 42  Therapy Charges for Today     Code Description Service Date Service Provider Modifiers Qty    05791191283 HC PT THER PROC EA 15 MIN 3/2/2022 Tr Kahn, PT GP 3                    Tr Kahn PT  3/2/2022

## 2022-03-07 ENCOUNTER — HOSPITAL ENCOUNTER (OUTPATIENT)
Dept: PHYSICAL THERAPY | Facility: HOSPITAL | Age: 62
Setting detail: THERAPIES SERIES
Discharge: HOME | End: 2022-03-07

## 2022-03-07 DIAGNOSIS — G89.29 CHRONIC LOW BACK PAIN, UNSPECIFIED BACK PAIN LATERALITY, UNSPECIFIED WHETHER SCIATICA PRESENT: Primary | ICD-10-CM

## 2022-03-07 DIAGNOSIS — M54.50 CHRONIC LOW BACK PAIN, UNSPECIFIED BACK PAIN LATERALITY, UNSPECIFIED WHETHER SCIATICA PRESENT: Primary | ICD-10-CM

## 2022-03-07 PROCEDURE — 97110 THERAPEUTIC EXERCISES: CPT | Performed by: PHYSICAL THERAPIST

## 2022-03-07 NOTE — THERAPY TREATMENT NOTE
Outpatient Physical Therapy Ortho Treatment Note  [facility]     Patient Name: Zoila Delgado  : 1960  MRN: [id number]  Today's Date: 3/7/2022      Visit Date: 2022    Visit Dx:    ICD-10-CM ICD-9-CM   1. Chronic low back pain, unspecified back pain laterality, unspecified whether sciatica present  M54.50 724.2    G89.29 338.29       Patient Active Problem List   Diagnosis   • Patellofemoral pain syndrome   • Knee crepitus   • Chondromalacia, knee   • Gastroesophageal reflux disease without esophagitis   • Monsivais's esophagus without dysplasia   • Primary osteoarthritis of right knee   • Constipation   • Gastroparesis        Past Medical History:   Diagnosis Date   • Ankle sprain    • Monsivais esophagus    • Cervical disc disorder    • Diverticular disease    • Diverticulitis    • Dyspepsia    • Gastritis    • GERD (gastroesophageal reflux disease)    • HH (hiatus hernia)    • Hyperlipidemia    • Perirectal abscess    • Renal cyst     left   • Rotator cuff syndrome         Past Surgical History:   Procedure Laterality Date   • COLONOSCOPY  2015    balbina mallory   • ENDOMETRIAL ABLATION     • ENDOSCOPY  2015    HH, Z-line irregular, 42 cm. fromincisors, chronic inflammation   • ENDOSCOPY N/A 2019    Monsivais's, HH   • TONSILECTOMY, ADENOIDECTOMY, BILATERAL MYRINGOTOMY AND TUBES                          PT Assessment/Plan     Row Name 22 1043          PT Assessment    Assessment Comments Ms. Delgado returns, reporting exacerbation of LBP following painting several rooms over the weekend, however recovered in an appropriate amount of time given perforamnce of a fairly novel activity. We reviwed realistic expectations and activity modifications. We continued to work on hip girdle/core strengthening, adding lateral step/lunge with AR.  Ms. Delgado will be out of town the remainder of the week and will return to the clinic in a week. She has 2-3 remaining sessions after which I suspect  she will be able to transition to an independent program.  -GJ            PT Plan    PT Plan Comments assess response to trip.. Likely DC to HEP in 2-3 sessions (what is currently established).  -GJ           User Key  (r) = Recorded By, (t) = Taken By, (c) = Cosigned By    Initials Name Provider Type    Tr Shetty, PT Physical Therapist                   OP Exercises     Row Name 03/07/22 1002 03/07/22 1000          Subjective Comments    Subjective Comments -- I painted all day saturday, up and down from ladders, and my back was tender, so I didn't do anythign yesterday and I feel better today. I just hate that things like that did't use to bother me and now they do  -GJ            Total Minutes    37673 - PT Therapeutic Exercise Minutes 41  -GJ --            Exercise 4    Exercise Name 4 -- seated HS stretch  -GJ     Cueing 4 -- Verbal;Demo  -GJ     Reps 4 -- 3  -GJ     Time 4 -- 20 s  -GJ            Exercise 6    Exercise Name 6 -- hip flexor stretch at step  -GJ     Cueing 6 -- Verbal;Demo  -GJ     Reps 6 -- 3  -GJ     Time 6 -- 20 s  -GJ            Exercise 7    Exercise Name 7 -- gastroc stretch at step  -GJ     Cueing 7 -- Verbal;Auditory  -GJ     Reps 7 -- 3  -GJ     Time 7 -- 20 s  -GJ            Exercise 8    Exercise Name 8 -- recumbet bike  -GJ     Time 8 -- 5 min  -GJ            Exercise 10    Exercise Name 10 -- lateral walking  -GJ     Cueing 10 -- Verbal  -GJ     Reps 10 -- 4 laps  -GJ     Time 10 -- RTB  -GJ     Additional Comments -- holing L2 balal above head  -GJ            Exercise 11    Exercise Name 11 -- monster walk, F/B  -GJ     Cueing 11 -- Verbal;Demo  -GJ     Reps 11 -- 4 laps  -GJ     Time 11 -- RTB  -GJ     Additional Comments -- holing L2 balal above head  -GJ            Exercise 12    Exercise Name 12 -- eccentric heel raises froms step  -GJ     Cueing 12 -- Verbal;Demo  -GJ     Reps 12 -- 15  -GJ     Time 12 -- 5 s on way down, B  -GJ     Additional Comments -- up  bilaterally, down on R only  -GJ            Exercise 13    Exercise Name 13 -- Seated HA/D2 on swiss ball with one foot off floor  -GJ     Reps 13 -- 15  -GJ     Time 13 -- RTB  -GJ            Exercise 14    Exercise Name 14 -- Hip flex/abd/ext, RTB at knees  -GJ     Cueing 14 -- Verbal;Demo  -GJ     Reps 14 -- 10 rounds B  -GJ     Time 14 -- on blue foam, cues for TA  -GJ            Exercise 16    Exercise Name 16 -- AR  -GJ     Cueing 16 -- Verbal;Demo  -GJ     Sets 16 -- 2  -GJ     Reps 16 -- 10  -GJ     Time 16 -- YTB  -GJ            Exercise 17    Exercise Name 17 -- lateral step with AR, B  -GJ     Cueing 17 -- Verbal;Demo  -GJ     Reps 17 -- 10 each direction  -GJ     Time 17 -- YTB  -GJ           User Key  (r) = Recorded By, (t) = Taken By, (c) = Cosigned By    Initials Name Provider Type     Tr Kahn, PT Physical Therapist                              PT OP Goals     Row Name 03/07/22 1000          PT Short Term Goals    STG Date to Achieve 03/14/22  -GJ     STG 1 pt. to be I with initial HEP to facilitate self management of their condition  -GJ     STG 1 Progress Met  -GJ     STG 2 pt. to be educated in/verbalize understanding of the importance of posture/ergonomics in association with their condition to facilitate self management of their condition  -GJ     STG 2 Progress Met  -GJ            Long Term Goals    LTG Date to Achieve 05/06/22  -GJ     LTG 1 pt. 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 to faciltiate ease with community activities  -GJ     LTG 3 Progress Ongoing;Partially Met;Progressing  -GJ           User Key   (r) = Recorded By, (t) = Taken By, (c) = Cosigned By    Initials Name Provider Type     Tr Kahn, PT Physical Therapist                Therapy Education  Education Details: discussed activity modifications, mabye breaking up activities such as painting into several days  Given: HEP, Symptoms/condition management, Pain management, Posture/body mechanics, Mobility training  Program: Reinforced  How Provided: Verbal, Demonstration  Provided to: Patient  Level of Understanding: Teach back education performed, Verbalized, Demonstrated              Time Calculation:   Start Time: 1002  Stop Time: 1043  Time Calculation (min): 41 min  Timed Charges  19430 - PT Therapeutic Exercise Minutes: 41  Total Minutes  Timed Charges Total Minutes: 41   Total Minutes: 41  Therapy Charges for Today     Code Description Service Date Service Provider Modifiers Qty    02816603362 HC PT THER PROC EA 15 MIN 3/7/2022 Tr Kahn, PT GP 3                    Tr Kahn, PT  3/7/2022

## 2022-03-16 ENCOUNTER — HOSPITAL ENCOUNTER (OUTPATIENT)
Dept: PHYSICAL THERAPY | Facility: HOSPITAL | Age: 62
Setting detail: THERAPIES SERIES
Discharge: HOME | End: 2022-03-16

## 2022-03-16 DIAGNOSIS — G89.29 CHRONIC LOW BACK PAIN, UNSPECIFIED BACK PAIN LATERALITY, UNSPECIFIED WHETHER SCIATICA PRESENT: Primary | ICD-10-CM

## 2022-03-16 DIAGNOSIS — M54.50 CHRONIC LOW BACK PAIN, UNSPECIFIED BACK PAIN LATERALITY, UNSPECIFIED WHETHER SCIATICA PRESENT: Primary | ICD-10-CM

## 2022-03-16 PROCEDURE — 97110 THERAPEUTIC EXERCISES: CPT

## 2022-03-16 NOTE — THERAPY TREATMENT NOTE
Outpatient Physical Therapy Ortho Treatment Note  [facility]     Patient Name: Zoila Delgado  : 1960  MRN: [id number]  Today's Date: 3/16/2022      Visit Date: 2022    Visit Dx:    ICD-10-CM ICD-9-CM   1. Chronic low back pain, unspecified back pain laterality, unspecified whether sciatica present  M54.50 724.2    G89.29 338.29       Patient Active Problem List   Diagnosis   • Patellofemoral pain syndrome   • Knee crepitus   • Chondromalacia, knee   • Gastroesophageal reflux disease without esophagitis   • Monsivais's esophagus without dysplasia   • Primary osteoarthritis of right knee   • Constipation   • Gastroparesis        Past Medical History:   Diagnosis Date   • Ankle sprain    • Monsivais esophagus    • Cervical disc disorder    • Diverticular disease    • Diverticulitis    • Dyspepsia    • Gastritis    • GERD (gastroesophageal reflux disease)    • HH (hiatus hernia)    • Hyperlipidemia    • Perirectal abscess    • Renal cyst     left   • Rotator cuff syndrome         Past Surgical History:   Procedure Laterality Date   • COLONOSCOPY  2015    balbina mallory   • ENDOMETRIAL ABLATION     • ENDOSCOPY  2015    HH, Z-line irregular, 42 cm. fromincisors, chronic inflammation   • ENDOSCOPY N/A 2019    Monsivais's, HH   • TONSILECTOMY, ADENOIDECTOMY, BILATERAL MYRINGOTOMY AND TUBES                          PT Assessment/Plan     Row Name 22 1004          PT Assessment    Assessment Comments Pt reports no pain while on trip last week and noted increased ease of lifting 18 month old great niece. Continued with strengthening and stability exercises with good tolerance and improved form overall. Administered updated HEP and discussed frequency of stretching vs strengthening for long term use.  -CN            PT Plan    PT Plan Comments Next visit work on stretches pt can perform while hiking (likely seated/lying on ground) and provide new HEP with different [employer] Access Code for trip  specific program.  -CN           User Key  (r) = Recorded By, (t) = Taken By, (c) = Cosigned By    Initials Name Provider Type    CN Rossi Chandler, PT Physical Therapist                   OP Exercises     Row Name 03/16/22 0900             Subjective Comments    Subjective Comments I feel pretty good. I flew to [place] and did fine there.  -CN              Subjective Pain    Able to rate subjective pain? yes  -CN      Pre-Treatment Pain Level 0  -CN              Total Minutes    50571 - PT Therapeutic Exercise Minutes 42  -CN              Exercise 4    Exercise Name 4 HS stretch at staris  -CN      Cueing 4 Verbal;Demo  -CN      Reps 4 3  -CN      Time 4 20 s  -CN              Exercise 6    Exercise Name 6 hip flexor stretch at step  -CN      Cueing 6 Verbal;Demo  -CN      Reps 6 3  -CN      Time 6 20 s  -CN              Exercise 7    Exercise Name 7 gastroc stretch at step  -CN      Cueing 7 Verbal;Auditory  -CN      Reps 7 3  -CN      Time 7 20 s  -CN              Exercise 8    Exercise Name 8 RCB  -CN      Time 8 5 min  -CN              Exercise 10    Exercise Name 10 lateral walking  -CN      Cueing 10 Verbal  -CN      Reps 10 4 laps  -CN      Time 10 RTB  -CN      Additional Comments holing L2 ball above head  -CN              Exercise 11    Exercise Name 11 monster walk, F/B  -CN      Cueing 11 Verbal;Demo  -CN      Reps 11 4 laps  -CN      Time 11 RTB  -CN      Additional Comments holing L2 ball above head  -CN              Exercise 12    Exercise Name 12 eccentric heel raises froms step  -CN      Cueing 12 Verbal;Demo  -CN      Reps 12 15  -CN      Time 12 5 s on way down  -CN      Additional Comments up bilaterally, down on R only  -CN              Exercise 13    Exercise Name 13 Seated HA/D2 on swiss ball with one foot off floor  -CN      Reps 13 15  -CN      Time 13 RTB  -CN              Exercise 14    Exercise Name 14 Hip flex/abd/ext, RTB at knees  -CN      Cueing 14 Verbal;Demo  -CN       Reps 14 10 rounds B  -CN      Time 14 on blue foam, cues for TA  -CN              Exercise 16    Exercise Name 16 AR  -CN      Cueing 16 Verbal;Demo  -CN      Sets 16 2  -CN      Reps 16 10  -CN      Time 16 YTB  -CN              Exercise 17    Exercise Name 17 Seated AR on swiss ball with 1 foot off ground  -CN      Cueing 17 Verbal;Demo  -CN      Reps 17 10 each direction  -CN      Time 17 YTB  -CN            User Key  (r) = Recorded By, (t) = Taken By, (c) = Cosigned By    Initials Name Provider Type    Rossi Calderón, PT Physical Therapist                              PT OP Goals     Row Name 03/16/22 1000          PT Short Term Goals    STG Date to Achieve 03/14/22  -CN     STG 1 pt. to be I with initial HEP to facilitate self management of their condition  -CN     STG 1 Progress Met  -CN     STG 2 pt. to be educated in/verbalize understanding of the importance of posture/ergonomics in association with their condition to facilitate self management of their condition  -CN     STG 2 Progress Met  -CN            Long Term Goals    LTG Date to Achieve 05/06/22  -CN     LTG 1 pt. to be I with advanced HEP to facilitate self management of their condition  -CN     LTG 1 Progress Progressing  -CN     LTG 1 Progress Comments Updated HEP today.  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 to faciltiate ease with community activities  -CN     LTG 3 Progress Ongoing;Partially Met;Progressing  -CN           User Key  (r) = Recorded By, (t) = Taken By, (c) = Cosigned By    Initials Name Provider Type    Rossi Calderón, PT Physical Therapist                Therapy Education  Given: HEP, Symptoms/condition management, Pain management,  Posture/body mechanics, Mobility training  Program: Reinforced  How Provided: Verbal, Demonstration  Provided to: Patient  Level of Understanding: Teach back education performed, Verbalized, Demonstrated              Time Calculation:   Start Time: 0915  Stop Time: 0957  Time Calculation (min): 42 min  Timed Charges  40933 - PT Therapeutic Exercise Minutes: 42  Total Minutes  Timed Charges Total Minutes: 42   Total Minutes: 42  Therapy Charges for Today     Code Description Service Date Service Provider Modifiers Qty    25211563360 HC PT THER PROC EA 15 MIN 3/16/2022 Rossi Chandler, PT GP 3                    Rossi Chandler, PT  3/16/2022

## 2022-03-21 ENCOUNTER — HOSPITAL ENCOUNTER (OUTPATIENT)
Dept: PHYSICAL THERAPY | Facility: HOSPITAL | Age: 62
Setting detail: THERAPIES SERIES
Discharge: HOME | End: 2022-03-21

## 2022-03-21 DIAGNOSIS — M54.50 CHRONIC LOW BACK PAIN, UNSPECIFIED BACK PAIN LATERALITY, UNSPECIFIED WHETHER SCIATICA PRESENT: Primary | ICD-10-CM

## 2022-03-21 DIAGNOSIS — G89.29 CHRONIC LOW BACK PAIN, UNSPECIFIED BACK PAIN LATERALITY, UNSPECIFIED WHETHER SCIATICA PRESENT: Primary | ICD-10-CM

## 2022-03-21 PROCEDURE — 97110 THERAPEUTIC EXERCISES: CPT | Performed by: PHYSICAL THERAPIST

## 2022-03-21 NOTE — THERAPY DISCHARGE NOTE
Outpatient Physical Therapy Ortho Treatment Note/Discharge Summary  [facility]     Patient Name: Zoila Delgado  : 1960  MRN: [id number]  Today's Date: 3/21/2022      Visit Date: 2022    Visit Dx:    ICD-10-CM ICD-9-CM   1. Chronic low back pain, unspecified back pain laterality, unspecified whether sciatica present  M54.50 724.2    G89.29 338.29       Patient Active Problem List   Diagnosis   • Patellofemoral pain syndrome   • Knee crepitus   • Chondromalacia, knee   • Gastroesophageal reflux disease without esophagitis   • Monsivais's esophagus without dysplasia   • Primary osteoarthritis of right knee   • Constipation   • Gastroparesis        Past Medical History:   Diagnosis Date   • Ankle sprain    • Monsivais esophagus    • Cervical disc disorder    • Diverticular disease    • Diverticulitis    • Dyspepsia    • Gastritis    • GERD (gastroesophageal reflux disease)    • HH (hiatus hernia)    • Hyperlipidemia    • Perirectal abscess    • Renal cyst     left   • Rotator cuff syndrome         Past Surgical History:   Procedure Laterality Date   • COLONOSCOPY  2015    balbina mallory   • ENDOMETRIAL ABLATION     • ENDOSCOPY  2015    HH, Z-line irregular, 42 cm. fromincisors, chronic inflammation   • ENDOSCOPY N/A 2019    Monsivais's, HH   • TONSILECTOMY, ADENOIDECTOMY, BILATERAL MYRINGOTOMY AND TUBES                          PT Assessment/Plan     Row Name 22 0914          PT Assessment    Functional Limitations --  -GJ     Assessment Comments Ms. Delgado has attended 7 sessions of physical therapy from -3/21/2022 for her LBP.  She is independent with her HEP, verbalizes a good understanding of her condition, and is ready to discharge from outpatient physical therapy.  Ms. Delgado is encoruaged to continue HEP regularly and is encouraged to call with any questions. Today, we reviewed exercises she can perform from a seated/standig position while onher trip. Ms. Delgado is  discharged from outpatient physical therapy to an independent program.  -GJ     Please refer to paper survey for additional self-reported information --  -GJ     Rehab Potential --  -GJ     Patient/caregiver participated in establishment of treatment plan and goals --  -GJ     Patient would benefit from skilled therapy intervention --  -GJ            PT Plan    PT Frequency --  -GJ     Predicted Duration of Therapy Intervention (PT) 10 visits  -GJ     Planned CPT's? --  -GJ     PT Plan Comments DC to HEP  -GJ           User Key  (r) = Recorded By, (t) = Taken By, (c) = Cosigned By    Initials Name Provider Type    Tr Shetty, PT Physical Therapist                     OP Exercises     Row Name 03/21/22 0913 03/21/22 0900          Subjective Comments    Subjective Comments -- my hips are better, my feet don't go as numb as much, really only when I do things like mulching  -GJ            Total Minutes    76969 - PT Therapeutic Exercise Minutes 25  -GJ --            Exercise 4    Exercise Name 4 -- HS stretch seated  -GJ     Cueing 4 -- Verbal;Demo  -GJ     Reps 4 -- 3  -GJ     Time 4 -- 20 s  -GJ            Exercise 8    Exercise Name 8 -- RCB  -GJ     Time 8 -- 5 min  -GJ            Exercise 18    Exercise Name 18 -- seated piriformis stretch  -GJ     Cueing 18 -- Verbal;Demo  -GJ     Reps 18 -- 3  -GJ     Time 18 -- 20 s  -GJ            Exercise 19    Exercise Name 19 -- seated hip flexor stretch, B  -GJ     Cueing 19 -- Verbal;Demo  -GJ     Reps 19 -- 3  -GJ     Time 19 -- 20 s  -GJ            Exercise 20    Exercise Name 20 -- SL trunk rotation  -GJ     Cueing 20 -- Verbal;Demo  -GJ     Reps 20 -- 3  -GJ     Time 20 -- 20 s  -GJ           User Key  (r) = Recorded By, (t) = Taken By, (c) = Cosigned By    Initials Name Provider Type    Tr Shetty, PT Physical Therapist                                PT OP Goals     Row Name 03/21/22 0900          PT Short Term Goals    STG Date to Achieve 03/14/22   -GJ     STG 1 pt. to be I with initial HEP to facilitate self management of their condition  -GJ     STG 1 Progress Met  -GJ     STG 2 pt. to be educated in/verbalize understanding of the importance of posture/ergonomics in association with their condition to facilitate self management of their condition  -GJ     STG 2 Progress Met  -GJ            Long Term Goals    LTG Date to Achieve 05/06/22  -GJ     LTG 1 pt. 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  Given: HEP, Symptoms/condition management, Pain management, Posture/body mechanics, Mobility training  Program: Reinforced  How Provided: Verbal, Demonstration  Provided to: Patient  Level of Understanding: Teach back education performed, Verbalized, Demonstrated              Time Calculation:   Start Time: 0914  Stop Time: 0940  Time Calculation (min): 26 min  Timed Charges  51482 - PT Therapeutic Exercise Minutes: 25  Total Minutes  Timed Charges Total Minutes: 25   Total Minutes: 25  Therapy Charges for Today     Code Description Service Date Service Provider Modifiers Qty    31478930343 HC PT THER PROC EA 15 MIN 3/21/2022 Tr Kahn, PT GP 2                OP PT Discharge Summary  Date of  Discharge: 03/21/22  Reason for Discharge: Independent, Patient/Caregiver request  Outcomes Achieved: Patient able to partially acheive established goals  Discharge Destination: Home with home program  Discharge Instructions/Additional Comments: continue HEP, call with questions      Tr Kahn, PT  3/21/2022

## 2022-03-22 ENCOUNTER — OFFICE VISIT (OUTPATIENT)
Dept: SPORTS MEDICINE | Facility: CLINIC | Age: 62
End: 2022-03-22

## 2022-03-22 VITALS
TEMPERATURE: 96.9 F | HEART RATE: 76 BPM | WEIGHT: 128 LBS | BODY MASS INDEX: 20.57 KG/M2 | RESPIRATION RATE: 16 BRPM | SYSTOLIC BLOOD PRESSURE: 108 MMHG | DIASTOLIC BLOOD PRESSURE: 72 MMHG | HEIGHT: 66 IN | OXYGEN SATURATION: 99 %

## 2022-03-22 DIAGNOSIS — M54.41 CHRONIC BILATERAL LOW BACK PAIN WITH BILATERAL SCIATICA: Primary | ICD-10-CM

## 2022-03-22 DIAGNOSIS — M47.816 FACET ARTHROPATHY, LUMBAR: ICD-10-CM

## 2022-03-22 DIAGNOSIS — M54.42 CHRONIC BILATERAL LOW BACK PAIN WITH BILATERAL SCIATICA: Primary | ICD-10-CM

## 2022-03-22 DIAGNOSIS — M51.36 DDD (DEGENERATIVE DISC DISEASE), LUMBAR: ICD-10-CM

## 2022-03-22 DIAGNOSIS — M43.16 SPONDYLOLISTHESIS, LUMBAR REGION: ICD-10-CM

## 2022-03-22 DIAGNOSIS — G89.29 CHRONIC BILATERAL LOW BACK PAIN WITH BILATERAL SCIATICA: Primary | ICD-10-CM

## 2022-03-22 PROCEDURE — 99213 OFFICE O/P EST LOW 20 MIN: CPT | Performed by: FAMILY MEDICINE

## 2022-03-22 NOTE — PROGRESS NOTES
"Zoila is a 62 y.o. year old female presents to [facility]    Chief Complaint   Patient presents with   • Back Pain     Back pain remains about the same. Pain in her hips is better, paresthesia in her feet is gone.       History of Present Illness    She reports improvement in her lower lumbar pain and paraesthesias, most notably in the bilateral feet. Denies any numbness at this time and denies any lumbar pain at night. She reports improvement of the cramping in her lower extremities. She has been participating in physical therapy biweekly, but she had her last session yesterday and was discharged at goal. She continues to perform an at HEP to include stretching. She denies any activity that she is unable to perform at this time.   Provocative factors: Long periods of sitting leads to increased stiffness in the lumbar area,    Palliative factors: HEP, stretching and rest, heating pad.   She denies taking the Robaxin previously prescribed as she reports poor response to these in the past. She states this class of medication causes nausea and dizziness.   She has multiple trips coming up to include a hiking trip trekking 10 miles/day and a [place] trip in April and believes she is physically ready for these trips.    I have reviewed the patient's medical, family, and social history in detail and updated the computerized patient record.    /72 (BP Location: Right arm, Patient Position: Sitting, Cuff Size: Adult)   Pulse 76   Temp 96.9 °F (36.1 °C)   Resp 16   Ht 167.6 cm (66\")   Wt 58.1 kg (128 lb)   SpO2 99%   BMI 20.66 kg/m²      Physical Exam    Mask worn thru encounter  Vital signs reviewed.   General: No acute distress.  Eyes: conjunctiva clear; pupils equally round and reactive  ENT: external ears atraumatic  CV: no peripheral edema  Resp: normal respiratory effort, no use of accessory muscles  Skin: no rashes or wounds; normal turgor  Psych: mood and affect " appropriate; recent and remote memory intact  Neuro: sensation to light touch intact    MSK Exam       Normal lumbar spine. No paraspinal tenderness.           Diagnoses and all orders for this visit:    Chronic bilateral low back pain with bilateral sciatica    Spondylolisthesis, lumbar region    Facet arthropathy, lumbar    DDD (degenerative disc disease), lumbar    Clinically doing well.  Has been discharged from PT due to her progress.  Continue with home exercises.  No further work-up at this time.  Follow-up as needed.      Follow Up   No follow-ups on file.  Patient was given instructions and counseling regarding her condition or for health maintenance advice. Please see specific information pulled into the AVS if appropriate.     EMR Dragon/Transcription disclaimer:    Much of this encounter note is an electronic transcription/translation of spoken language to printed text.  The electronic translation of spoken language may permit erroneous, or at times, nonsensical words or phrases to be inadvertently transcribed.  Although I have reviewed the note for such errors some may still exist.

## 2022-03-23 ENCOUNTER — APPOINTMENT (OUTPATIENT)
Dept: PHYSICAL THERAPY | Facility: HOSPITAL | Age: 62
End: 2022-03-23

## 2022-04-25 ENCOUNTER — CLINICAL SUPPORT (OUTPATIENT)
Dept: ORTHOPEDIC SURGERY | Facility: CLINIC | Age: 62
End: 2022-04-25

## 2022-04-25 VITALS — WEIGHT: 128 LBS | BODY MASS INDEX: 20.57 KG/M2 | HEIGHT: 66 IN

## 2022-04-25 DIAGNOSIS — M17.11 PRIMARY OSTEOARTHRITIS OF RIGHT KNEE: Primary | ICD-10-CM

## 2022-04-25 DIAGNOSIS — M70.62 TROCHANTERIC BURSITIS OF LEFT HIP: ICD-10-CM

## 2022-04-25 PROCEDURE — 20610 DRAIN/INJ JOINT/BURSA W/O US: CPT | Performed by: ORTHOPAEDIC SURGERY

## 2022-04-25 RX ORDER — ROSUVASTATIN CALCIUM 10 MG/1
TABLET, COATED ORAL
COMMUNITY
Start: 2022-04-18 | End: 2023-01-31 | Stop reason: DRUGHIGH

## 2022-04-25 RX ADMIN — LIDOCAINE HYDROCHLORIDE 8 ML: 10 INJECTION, SOLUTION EPIDURAL; INFILTRATION; INTRACAUDAL; PERINEURAL at 08:00

## 2022-04-25 RX ADMIN — TRIAMCINOLONE ACETONIDE 80 MG: 40 INJECTION, SUSPENSION INTRA-ARTICULAR; INTRAMUSCULAR at 08:00

## 2022-05-02 PROCEDURE — 20610 DRAIN/INJ JOINT/BURSA W/O US: CPT | Performed by: ORTHOPAEDIC SURGERY

## 2022-05-02 RX ORDER — LIDOCAINE HYDROCHLORIDE 10 MG/ML
8 INJECTION, SOLUTION EPIDURAL; INFILTRATION; INTRACAUDAL; PERINEURAL
Status: COMPLETED | OUTPATIENT
Start: 2022-04-25 | End: 2022-04-25

## 2022-05-02 RX ORDER — TRIAMCINOLONE ACETONIDE 40 MG/ML
80 INJECTION, SUSPENSION INTRA-ARTICULAR; INTRAMUSCULAR
Status: COMPLETED | OUTPATIENT
Start: 2022-05-02 | End: 2022-05-02

## 2022-05-02 RX ORDER — LIDOCAINE HYDROCHLORIDE 10 MG/ML
2 INJECTION, SOLUTION EPIDURAL; INFILTRATION; INTRACAUDAL; PERINEURAL
Status: COMPLETED | OUTPATIENT
Start: 2022-05-02 | End: 2022-05-02

## 2022-05-02 RX ORDER — TRIAMCINOLONE ACETONIDE 40 MG/ML
80 INJECTION, SUSPENSION INTRA-ARTICULAR; INTRAMUSCULAR
Status: COMPLETED | OUTPATIENT
Start: 2022-04-25 | End: 2022-04-25

## 2022-05-02 RX ADMIN — LIDOCAINE HYDROCHLORIDE 2 ML: 10 INJECTION, SOLUTION EPIDURAL; INFILTRATION; INTRACAUDAL; PERINEURAL at 21:52

## 2022-05-02 RX ADMIN — TRIAMCINOLONE ACETONIDE 80 MG: 40 INJECTION, SUSPENSION INTRA-ARTICULAR; INTRAMUSCULAR at 21:52

## 2022-05-23 RX ORDER — PREDNISONE 10 MG/1
TABLET ORAL
Qty: 39 TABLET | Refills: 0 | Status: SHIPPED | OUTPATIENT
Start: 2022-05-23 | End: 2022-07-27

## 2022-05-27 ENCOUNTER — IMMUNIZATION (OUTPATIENT)
Dept: VACCINE CLINIC | Facility: HOSPITAL | Age: 62
End: 2022-05-27

## 2022-05-27 DIAGNOSIS — Z23 NEED FOR VACCINATION: Primary | ICD-10-CM

## 2022-05-27 PROCEDURE — 0054A HC ADM SARSCV2 30MCG TRS-SUCR BOOSTER: CPT | Performed by: INTERNAL MEDICINE

## 2022-05-27 PROCEDURE — 91305 HC SARSCOV2 VAC 30 MCG TRS-SUCR PFIZER: CPT | Performed by: INTERNAL MEDICINE

## 2022-07-12 ENCOUNTER — APPOINTMENT (OUTPATIENT)
Dept: WOMENS IMAGING | Facility: HOSPITAL | Age: 62
End: 2022-07-12

## 2022-07-12 PROCEDURE — 77063 BREAST TOMOSYNTHESIS BI: CPT | Performed by: RADIOLOGY

## 2022-07-12 PROCEDURE — 77067 SCR MAMMO BI INCL CAD: CPT | Performed by: RADIOLOGY

## 2022-07-20 DIAGNOSIS — M70.62 TROCHANTERIC BURSITIS OF LEFT HIP: Primary | ICD-10-CM

## 2022-07-20 DIAGNOSIS — M25.552 LEFT HIP PAIN: ICD-10-CM

## 2022-07-27 ENCOUNTER — HOSPITAL ENCOUNTER (OUTPATIENT)
Dept: MRI IMAGING | Facility: HOSPITAL | Age: 62
Discharge: HOME | End: 2022-07-27
Admitting: ORTHOPAEDIC SURGERY

## 2022-07-27 ENCOUNTER — CLINICAL SUPPORT (OUTPATIENT)
Dept: ORTHOPEDIC SURGERY | Facility: CLINIC | Age: 62
End: 2022-07-27

## 2022-07-27 DIAGNOSIS — M70.62 TROCHANTERIC BURSITIS OF LEFT HIP: ICD-10-CM

## 2022-07-27 DIAGNOSIS — M17.11 PRIMARY OSTEOARTHRITIS OF RIGHT KNEE: Primary | ICD-10-CM

## 2022-07-27 PROCEDURE — 20610 DRAIN/INJ JOINT/BURSA W/O US: CPT | Performed by: ORTHOPAEDIC SURGERY

## 2022-07-27 PROCEDURE — 73721 MRI JNT OF LWR EXTRE W/O DYE: CPT

## 2022-07-27 RX ORDER — ALBUTEROL SULFATE 2.5 MG/3ML
SOLUTION RESPIRATORY (INHALATION)
COMMUNITY
Start: 2022-05-17

## 2022-07-27 RX ADMIN — LIDOCAINE HYDROCHLORIDE 8 ML: 10 INJECTION, SOLUTION EPIDURAL; INFILTRATION; INTRACAUDAL; PERINEURAL at 08:03

## 2022-07-27 RX ADMIN — TRIAMCINOLONE ACETONIDE 80 MG: 40 INJECTION, SUSPENSION INTRA-ARTICULAR; INTRAMUSCULAR at 08:03

## 2022-07-29 ENCOUNTER — TELEPHONE (OUTPATIENT)
Dept: ORTHOPEDIC SURGERY | Facility: CLINIC | Age: 62
End: 2022-07-29

## 2022-07-29 NOTE — TELEPHONE ENCOUNTER
TALKED WITH DR CHAMBERS AND HE ASKED THAT I LET THE PATIENT KNOW THAT HER MRI SHOW WEAR AN TEAR ON HER HIP, MINIMAL ARTHRITIS, NOTHING THAT NEEDS SURGERY.  IF THE LAST HIP INJECTION THAT SHE RECEIVED DID NOT HELP MUCH HE RECOMMENDS SHE GET [facility]JECTION INTO [facility].  PATIENT SAID HER HIP HAS NOT HURT IN SEVERAL DAYS SO SHE WILL WAIT AND SEE, BUT SHE WOULD LIKE HER NEXT STEP TO BE THE INJECTION [facility]

## 2022-08-03 RX ORDER — LIDOCAINE HYDROCHLORIDE 10 MG/ML
8 INJECTION, SOLUTION EPIDURAL; INFILTRATION; INTRACAUDAL; PERINEURAL
Status: COMPLETED | OUTPATIENT
Start: 2022-07-27 | End: 2022-07-27

## 2022-08-03 RX ORDER — TRIAMCINOLONE ACETONIDE 40 MG/ML
80 INJECTION, SUSPENSION INTRA-ARTICULAR; INTRAMUSCULAR
Status: COMPLETED | OUTPATIENT
Start: 2022-07-27 | End: 2022-07-27

## 2022-08-09 ENCOUNTER — TRANSCRIBE ORDERS (OUTPATIENT)
Dept: ADMINISTRATIVE | Facility: HOSPITAL | Age: 62
End: 2022-08-09

## 2022-08-09 DIAGNOSIS — Z13.9 SCREENING DUE: Primary | ICD-10-CM

## 2022-09-30 ENCOUNTER — IMMUNIZATION (OUTPATIENT)
Dept: VACCINE CLINIC | Facility: HOSPITAL | Age: 62
End: 2022-09-30

## 2022-09-30 DIAGNOSIS — Z23 NEED FOR VACCINATION: Primary | ICD-10-CM

## 2022-09-30 PROCEDURE — 0124A: CPT | Performed by: INTERNAL MEDICINE

## 2022-09-30 PROCEDURE — 91312 HC SARSCOV2 VAC 30MCG/0.3ML IM BIVALENT BOOSTER 12 YRS AND OLDER: CPT | Performed by: INTERNAL MEDICINE

## 2022-10-02 DIAGNOSIS — G89.29 CHRONIC BILATERAL LOW BACK PAIN WITH BILATERAL SCIATICA: ICD-10-CM

## 2022-10-02 DIAGNOSIS — M47.816 FACET ARTHROPATHY, LUMBAR: ICD-10-CM

## 2022-10-02 DIAGNOSIS — M51.36 DDD (DEGENERATIVE DISC DISEASE), LUMBAR: ICD-10-CM

## 2022-10-02 DIAGNOSIS — M54.42 CHRONIC BILATERAL LOW BACK PAIN WITH BILATERAL SCIATICA: ICD-10-CM

## 2022-10-02 DIAGNOSIS — M43.16 SPONDYLOLISTHESIS, LUMBAR REGION: ICD-10-CM

## 2022-10-02 DIAGNOSIS — M54.41 CHRONIC BILATERAL LOW BACK PAIN WITH BILATERAL SCIATICA: ICD-10-CM

## 2022-10-03 ENCOUNTER — PATIENT MESSAGE (OUTPATIENT)
Dept: SPORTS MEDICINE | Facility: CLINIC | Age: 62
End: 2022-10-03

## 2022-10-03 RX ORDER — METHOCARBAMOL 500 MG/1
500 TABLET, FILM COATED ORAL NIGHTLY PRN
Qty: 30 TABLET | Refills: 0 | Status: SHIPPED | OUTPATIENT
Start: 2022-10-03

## 2022-10-03 NOTE — TELEPHONE ENCOUNTER
From: Zoila Delgado  To: MARCELL Owen Jr., DO  Sent: 10/3/2022 1:06 PM EDT  Subject: Sciatic pain     I’ve had sciatica down my left leg for a week now. I haven’t had this pain before. Is there any treatment you can do to help? I’ve been taking the muscle relaxer you prescribed, hasn’t really helped. I’ve been applying ice and stretching.

## 2022-10-06 NOTE — TELEPHONE ENCOUNTER
Pt is asking to be seen today due to going out of town tomorrow.   Obviously I don't have anything for her.       Is there something we can give her for the pain that is radiating down her leg to her foot?     bsw

## 2022-10-10 ENCOUNTER — OFFICE VISIT (OUTPATIENT)
Dept: SPORTS MEDICINE | Facility: CLINIC | Age: 62
End: 2022-10-10

## 2022-10-10 VITALS
SYSTOLIC BLOOD PRESSURE: 110 MMHG | TEMPERATURE: 97.7 F | OXYGEN SATURATION: 98 % | DIASTOLIC BLOOD PRESSURE: 80 MMHG | WEIGHT: 125 LBS | HEIGHT: 66 IN | HEART RATE: 86 BPM | RESPIRATION RATE: 16 BRPM | BODY MASS INDEX: 20.09 KG/M2

## 2022-10-10 DIAGNOSIS — M43.16 SPONDYLOLISTHESIS, LUMBAR REGION: ICD-10-CM

## 2022-10-10 DIAGNOSIS — M54.42 ACUTE LEFT-SIDED LOW BACK PAIN WITH LEFT-SIDED SCIATICA: Primary | ICD-10-CM

## 2022-10-10 DIAGNOSIS — M51.36 DDD (DEGENERATIVE DISC DISEASE), LUMBAR: ICD-10-CM

## 2022-10-10 PROCEDURE — 99213 OFFICE O/P EST LOW 20 MIN: CPT | Performed by: FAMILY MEDICINE

## 2022-10-10 RX ORDER — METHYLPREDNISOLONE 4 MG/1
TABLET ORAL
Qty: 21 TABLET | Refills: 0 | Status: SHIPPED | OUTPATIENT
Start: 2022-10-10 | End: 2022-11-03

## 2022-10-10 NOTE — PROGRESS NOTES
"Zoila is a 62 y.o. year old female presents to [facility]    Chief Complaint   Patient presents with   • Back Pain     F/u eval for lower back/buttock pain with radiation down the left lower leg, present for about 2 weeks, NKI but reports doing some heavy lifting - some tingling in the foot but no other sxs - here today for further evaluation and treatment        History of Present Illness  Was lifting heavy furniture 2 wks ago when sxs began. Has cont to do HEP, goes to  every Mon - had Theragun last Mon which helped. Pain primarily in L buttock but does shoot down back of upper leg. Has tried Aleve which helps. Has also tried muscle relaxer. Plane trip Weds for long weekend upcoming.    I have reviewed the patient's medical, family, and social history in detail and updated the computerized patient record.    /80 (BP Location: Left arm, Patient Position: Standing, Cuff Size: Adult)   Pulse 86   Temp 97.7 °F (36.5 °C) (Temporal)   Resp 16   Ht 167.6 cm (65.98\")   Wt 56.7 kg (125 lb)   SpO2 98%   BMI 20.19 kg/m²      Physical Exam    Mask worn thru encounter  Vital signs reviewed.   General: No acute distress.  Eyes: conjunctiva clear; pupils equally round and reactive  ENT: external ears atraumatic  CV: no peripheral edema  Resp: normal respiratory effort, no use of accessory muscles  Skin: no rashes or wounds; normal turgor  Psych: mood and affect appropriate; recent and remote memory intact  Neuro: sensation to light touch intact    MSK Exam  L spine: no TTP. 1+ DTRs L4, S1 b/l. + SLR on L. - MAEVE, - BRODIE.                 Diagnoses and all orders for this visit:    Acute left-sided low back pain with left-sided sciatica  -     methylPREDNISolone (MEDROL) 4 MG dose pack; Take as directed on package instructions.  -     Ambulatory Referral to Physical Therapy    Spondylolisthesis, lumbar region  -     Ambulatory Referral to Physical Therapy    DDD (degenerative " disc disease), lumbar  -     Ambulatory Referral to Physical Therapy    No red flag symptoms.  Discussed nature of acute exacerbation of previous pathology.  Medrol Dosepak sent.  To initiate PT.  Consider MRI lumbar spine.      Follow Up   No follow-ups on file.  Patient was given instructions and counseling regarding her condition or for health maintenance advice. Please see specific information pulled into the AVS if appropriate.     EMR Dragon/Transcription disclaimer:    Much of this encounter note is an electronic transcription/translation of spoken language to printed text.  The electronic translation of spoken language may permit erroneous, or at times, nonsensical words or phrases to be inadvertently transcribed.  Although I have reviewed the note for such errors some may still exist.

## 2022-10-18 ENCOUNTER — TRANSCRIBE ORDERS (OUTPATIENT)
Dept: ADMINISTRATIVE | Facility: HOSPITAL | Age: 62
End: 2022-10-18

## 2022-10-18 DIAGNOSIS — E04.9 ENLARGEMENT OF THYROID: Primary | ICD-10-CM

## 2022-10-21 ENCOUNTER — HOSPITAL ENCOUNTER (OUTPATIENT)
Dept: ULTRASOUND IMAGING | Facility: HOSPITAL | Age: 62
Discharge: HOME | End: 2022-10-21
Admitting: INTERNAL MEDICINE

## 2022-10-21 DIAGNOSIS — E04.9 ENLARGEMENT OF THYROID: ICD-10-CM

## 2022-10-21 PROCEDURE — 76536 US EXAM OF HEAD AND NECK: CPT

## 2022-11-01 ENCOUNTER — HOSPITAL ENCOUNTER (OUTPATIENT)
Dept: PHYSICAL THERAPY | Facility: HOSPITAL | Age: 62
Setting detail: THERAPIES SERIES
Discharge: HOME | End: 2022-11-01

## 2022-11-01 DIAGNOSIS — M54.42 ACUTE LEFT-SIDED LOW BACK PAIN WITH LEFT-SIDED SCIATICA: Primary | ICD-10-CM

## 2022-11-01 PROCEDURE — 97161 PT EVAL LOW COMPLEX 20 MIN: CPT

## 2022-11-01 PROCEDURE — 97110 THERAPEUTIC EXERCISES: CPT

## 2022-11-01 NOTE — THERAPY EVALUATION
Outpatient Physical Therapy Ortho Initial Evaluation  [facility]     Patient Name: Zoila Delgado  : 1960  MRN: [id number]  Today's Date: 2022      Visit Date: 2022    Patient Active Problem List   Diagnosis   • Patellofemoral pain syndrome   • Knee crepitus   • Chondromalacia, knee   • Gastroesophageal reflux disease without esophagitis   • Monsivais's esophagus without dysplasia   • Primary osteoarthritis of right knee   • Constipation   • Gastroparesis        Past Medical History:   Diagnosis Date   • Ankle sprain    • Arthritis    • Monsivais esophagus    • Cervical disc disorder    • Diverticular disease    • Diverticulitis    • Dyspepsia    • Gastritis    • GERD (gastroesophageal reflux disease)    • HH (hiatus hernia)    • Hyperlipidemia    • Low back pain    • Perirectal abscess    • Renal cyst     left   • Rotator cuff syndrome    • Shoulder injury         Past Surgical History:   Procedure Laterality Date   • COLONOSCOPY  2015    mohamud,balbina   • ENDOMETRIAL ABLATION     • ENDOSCOPY  2015    HH, Z-line irregular, 42 cm. fromincisors, chronic inflammation   • ENDOSCOPY N/A 2019    Monsivais's, HH   • TONSILECTOMY, ADENOIDECTOMY, BILATERAL MYRINGOTOMY AND TUBES     • TONSILLECTOMY         Visit Dx:     ICD-10-CM ICD-9-CM   1. Acute left-sided low back pain with left-sided sciatica  M54.42 724.2     724.3          Patient History     Row Name 22 0800             History    Chief Complaint Pain;Numbness;Tinglings  -DB      Date Current Problem(s) Began 22  -DB      Brief Description of Current Complaint Zoila Delgado is a 62 y.o. female who presents today with LBP and L sided sciatica. Pt states pain started about a month ago, she went to her doctor who prescribed her steroids that helped with the pain initially. Pt states she stopped taking the steroid a few weeks ago, but pain returned a few days ago. Zoila Delgado reports difficulty/increased pain with  sitting, driving, exercising. Pt walks about 5 miles everyday, when she first starts she will have pain, but then it subsides and she is able to continue walking. Pain relieving factors include mediation, lower back stretches. Pt was seen earlier this year in PT for an episode of LBP and has continued to do some stretches/exercises. Sleep is not being disturbed. Pt reports history of shots in L hip and R knee for OA.  -DB      Occupation/sports/leisure activities walking, exercising  -DB         Pain     Pain Location Back;Leg  -DB      Pain at Present 1  -DB      Pain at Best 0  -DB      Pain at Worst 2  -DB      Is your sleep disturbed? No  -DB         Fall Risk Assessment    Any falls in the past year: No  -DB      Number of falls reported in the last 12 months 0  -DB            User Key  (r) = Recorded By, (t) = Taken By, (c) = Cosigned By    Initials Name Provider Type    DB Zena Elaine PT Physical Therapist                 PT Ortho     Row Name 11/01/22 1200       Posture/Observations    Alignment Options Lumbar lordosis  -DB    Lumbar lordosis Decreased  -DB       Lumbar/SI Special Tests    Slump Test (Neural Tension) Negative  -DB    SLR (Neural Tension) Positive;Left:  -DB    SI Compression Test (SI Dysfunction) Negative  -DB    SI Distraction Test (SI Dysfunction) Negative  -DB    MAEVE (hip vs. SI Dysfunction) Negative  -DB       Head/Neck/Trunk    Trunk Extension AROM WNL  -DB    Trunk Flexion AROM WNL  -DB    Trunk Lt Lateral Flexion AROM WNL  -DB    Trunk Rt Lateral Flexion AROM WNL  -DB    Trunk Lt Rotation AROM WNL  -DB    Trunk Rt Rotation AROM WNL  -DB       MMT Right Lower Ext    Rt Hip Flexion MMT, Gross Movement (5/5) normal  -DB    Rt Hip Extension MMT, Gross Movement (4-/5) good minus  -DB    Rt Hip ABduction MMT, Gross Movement (4-/5) good minus  -DB    Rt Knee Extension MMT, Gross Movement (5/5) normal  -DB    Rt Knee Flexion MMT, Gross Movement (5/5) normal  -DB    Rt Ankle  Dorsiflexion MMT, Gross Movement (5/5) normal  -DB       MMT Left Lower Ext    Lt Hip Flexion MMT, Gross Movement (4+/5) good plus  -DB    Lt Hip Extension MMT, Gross Movement (4-/5) good minus  -DB    Lt Hip ABduction MMT, Gross Movement (4-/5) good minus  -DB    Lt Knee Extension MMT, Gross Movement (5/5) normal  -DB    Lt Knee Flexion MMT, Gross Movement (4+/5) good plus  pain in LLE  -DB    Lt Ankle Dorsiflexion MMT, Gross Movement (5/5) normal  -DB       Sensation    Sensation WNL? WNL  -DB       Lower Extremity Flexibility    Hamstrings Bilateral:;Mildly limited  -DB    Hip External Rotators Bilateral:;Moderately limited  -DB    Hip Internal Rotators Bilateral:;Moderately limited  -DB          User Key  (r) = Recorded By, (t) = Taken By, (c) = Cosigned By    Initials Name Provider Type    DB Zena Elaine, PT Physical Therapist                            Therapy Education  Education Details: Access Code [email or web address]  Given: HEP, Symptoms/condition management, Pain management  Program: New  How Provided: Verbal, Demonstration, Written  Provided to: Patient  Level of Understanding: Verbalized, Demonstrated      PT OP Goals     Row Name 11/01/22 1200          PT Short Term Goals    STG Date to Achieve 11/15/22  -DB     STG 1 Patient will be independent with education for symptom management and initial HEP to decrease LBP pain.  -DB     STG 1 Progress New  -DB     STG 2 Pt will improve B LE strength to at least 4+/5.  -DB     STG 2 Progress New  -DB        Long Term Goals    LTG Date to Achieve 11/29/22  -DB     LTG 1 Patient will be independent with education for symptom management and advanced HEP to decrease LBP pain.  -DB     LTG 1 Progress New  -DB     LTG 2 Patient will be able to participate in regular leisure acitivty such as ambulating 5 miles or riding her Peloton without any inc'd LBP or LLE pain.  -DB     LTG 2 Progress New  -DB     LTG 3 Patient will be able to sit and drive for > 1 hour without inc'd  pain or symptoms.  -DB     LTG 3 Progress New  -DB        Time Calculation    PT Goal Re-Cert Due Date 01/30/23  -DB           User Key  (r) = Recorded By, (t) = Taken By, (c) = Cosigned By    Initials Name Provider Type    DB Zena Elaine, PT Physical Therapist                 PT Assessment/Plan     Row Name 11/01/22 1200          PT Assessment    Functional Limitations Limitations in community activities;Performance in leisure activities;Performance in sport activities  -DB     Impairments Pain;Muscle strength;Impaired flexibility;Posture  -DB     Assessment Comments Zoila Delgado is a 62 y.o. female referred to physical therapy for LBP/L sided sciatica. She presents with a stable clinical presentation, along with no remarkable comorbidities or personal factors that may impact her progress in the plan of care. Pt presents today with dec'd hip flexibility and strength, and abnormal posture. Her signs and symptoms are consistent with referring diagnosis. The previous impairments limit her ability to participate in exercise and leisure activity without pain. Pt will benefit from skilled PT to address the previous impairments and return to OF.  -DB     Please refer to paper survey for additional self-reported information Yes  -DB     Rehab Potential Good  -DB     Patient/caregiver participated in establishment of treatment plan and goals Yes  -DB     Patient would benefit from skilled therapy intervention Yes  -DB        PT Plan    PT Frequency 1x/week  -DB     Predicted Duration of Therapy Intervention (PT) 4-6 visits  -DB     Planned CPT's? PT EVAL LOW COMPLEXITY: 07535;PT RE-EVAL: 99613;PT THER PROC EA 15 MIN: 83710;PT THER ACT EA 15 MIN: 35702;PT MANUAL THERAPY EA 15 MIN: 57280;PT NEUROMUSC RE-EDUCATION EA 15 MIN: 79306;PT SELF CARE/HOME MGMT/TRAIN EA 15: 93245;PT HOT OR COLD PACK TREAT MCARE  -DB     PT Plan Comments Next session: continue with proximal hip strengthening  -DB           User Key  (r) =  Recorded By, (t) = Taken By, (c) = Cosigned By    Initials Name Provider Type    DB Zena Elaine, PT Physical Therapist                   OP Exercises     Row Name 11/01/22 0800             Total Minutes    92264 - PT Therapeutic Exercise Minutes 23  -DB         Exercise 1    Exercise Name 1 sciatic nerve floss  -DB      Cueing 1 Verbal;Demo;Tactile  -DB      Reps 1 15  -DB         Exercise 2    Exercise Name 2 HS stretch  -DB      Cueing 2 Verbal;Demo  -DB      Reps 2 3  -DB      Time 2 20 sec  -DB         Exercise 3    Exercise Name 3 piriformis stretch  -DB      Reps 3 3  -DB      Time 3 20 sec  -DB         Exercise 4    Exercise Name 4 bridges  -DB      Cueing 4 Verbal;Tactile  -DB      Sets 4 2  -DB      Reps 4 10  -DB         Exercise 5    Exercise Name 5 hip abd  -DB      Cueing 5 Verbal;Tactile  -DB      Sets 5 2  -DB      Reps 5 10  -DB            User Key  (r) = Recorded By, (t) = Taken By, (c) = Cosigned By    Initials Name Provider Type    DB Zena Elaine, PT Physical Therapist                              Outcome Measure Options: Modified Oswestry  Modified Oswestry  Modified Oswestry Score/Comments: 12%      Time Calculation:     Start Time: 0835  Stop Time: 0915  Time Calculation (min): 40 min  Total Timed Code Minutes- PT: 23 minute(s)  Timed Charges  63431 - PT Therapeutic Exercise Minutes: 23  Total Minutes  Timed Charges Total Minutes: 23   Total Minutes: 23     Therapy Charges for Today     Code Description Service Date Service Provider Modifiers Qty    57690148580 HC PT THER PROC EA 15 MIN 11/1/2022 Zena Elaine, PT GP 2    [id number]  PT EVAL LOW COMPLEXITY 1 11/1/2022 Zena Elaine, PT GP 1          PT G-Codes  Outcome Measure Options: Modified Oswestry  Modified Oswestry Score/Comments: 12%         Zena Elaine PT  11/1/2022

## 2022-11-03 ENCOUNTER — OFFICE VISIT (OUTPATIENT)
Dept: ORTHOPEDIC SURGERY | Facility: CLINIC | Age: 62
End: 2022-11-03

## 2022-11-03 VITALS — BODY MASS INDEX: 20.09 KG/M2 | HEIGHT: 66 IN | WEIGHT: 125 LBS

## 2022-11-03 DIAGNOSIS — M70.62 TROCHANTERIC BURSITIS OF LEFT HIP: ICD-10-CM

## 2022-11-03 DIAGNOSIS — M17.11 PRIMARY OSTEOARTHRITIS OF RIGHT KNEE: Primary | ICD-10-CM

## 2022-11-03 PROCEDURE — 20610 DRAIN/INJ JOINT/BURSA W/O US: CPT | Performed by: ORTHOPAEDIC SURGERY

## 2022-11-03 PROCEDURE — 99214 OFFICE O/P EST MOD 30 MIN: CPT | Performed by: ORTHOPAEDIC SURGERY

## 2022-11-03 RX ADMIN — TRIAMCINOLONE ACETONIDE 80 MG: 40 INJECTION, SUSPENSION INTRA-ARTICULAR; INTRAMUSCULAR at 12:40

## 2022-11-03 RX ADMIN — LIDOCAINE HYDROCHLORIDE 8 ML: 10 INJECTION, SOLUTION EPIDURAL; INFILTRATION; INTRACAUDAL; PERINEURAL at 12:40

## 2022-11-03 RX ADMIN — LIDOCAINE HYDROCHLORIDE 4 ML: 10 INJECTION, SOLUTION EPIDURAL; INFILTRATION; INTRACAUDAL; PERINEURAL at 12:40

## 2022-11-03 NOTE — PROGRESS NOTES
Subjective:     Patient ID: Zoila Delgado is a 62 y.o. female.    Chief Complaint:  Follow-up right knee pain, DJD  Last injection-7/27/2022    Follow-up left knee pain, trochanteric bursitis  Last injection-May 2022    History of Present Illness  Zoila Delgado returns to clinic today for evaluation of right knee pain.    The patient notes that over the last 3 to 4 weeks, she has had increasing levels of pain to her right knee, particularly over the anterolateral aspect of her knee. She rates her pain as a 7 to 8 out of 10, aching in nature, exacerbated with prolonged walking, weightbearing, deep flexion activities, as well as stair climbing. She notes minimal improvement with rest. She denies any associated numbness or tingling. Mild radiation of pain. Moderate swelling that does wax and wane. She denies any edil locking or catching.    The patient notes that her left hip has also caused increasing levels of pain over the lateral side of the hip over the last 4 to 5 days. She has had to get successful with prior injection, most recently was in 05/2022 over her trochanteric bursa. She denies any numbness or tingling at this point in time. She rates her pain as a 4 to 5 out of 10, aching in nature, occasional sharp pain, particularly with laying on her left side. Mild improvement with rest. No groin pain at this time.       Social History     Occupational History   • Occupation: RETIRED    Tobacco Use   • Smoking status: Never   • Smokeless tobacco: Never   Vaping Use   • Vaping Use: Never used   Substance and Sexual Activity   • Alcohol use: No   • Drug use: No   • Sexual activity: Yes     Partners: Female     Birth control/protection: None      Past Medical History:   Diagnosis Date   • Ankle sprain    • Arthritis    • Monsivais esophagus    • Cervical disc disorder    • Diverticular disease    • Diverticulitis    • Dyspepsia    • Gastritis    • GERD (gastroesophageal reflux disease)    • HH (hiatus hernia)    •  "Hyperlipidemia    • Low back pain    • Perirectal abscess    • Renal cyst     left   • Rotator cuff syndrome    • Shoulder injury      Past Surgical History:   Procedure Laterality Date   • COLONOSCOPY  11/30/2015    tics,nibh   • ENDOMETRIAL ABLATION     • ENDOSCOPY  11/30/2015    HH, Z-line irregular, 42 cm. fromincisors, chronic inflammation   • ENDOSCOPY N/A 05/07/2019    M[facility]ais's, HH   • TONSILECTOMY, ADENOIDECTOMY, BILATERAL MYRINGOTOMY AND TUBES     • TONSILLECTOMY  1968       Family History   Problem Relation Age of Onset   • Cancer Mother    • No Known Problems Father    • Breast cancer Sister    • Stroke Maternal Grandmother    • Cancer Sister          Review of Systems        Objective:  Vitals:    11/03/22 1233   Weight: 56.7 kg (125 lb)   Height: 167.6 cm (65.98\")         11/03/22  1233   Weight: 56.7 kg (125 lb)     Body mass index is 20.19 kg/m².  Physical Exam    Vital signs reviewed.   General: No acute distress, alert and oriented  Eyes: conjunctiva clear; pupils equally round and reactive  ENT: external ears and nose atraumatic; oropharynx clear  CV: no peripheral edema  Resp: normal respiratory effort  Skin: no rashes or wounds; normal turgor  Psych: mood and affect appropriate; recent and remote memory intact          Ortho Exam       Right Knee-    Active Range of Motion- 0 to 135 degrees  4+/5 on flexion  4+/5 on extension  Maximal tenderness to palpation along the lateral joint line as well as lateral patellar facet.  Lizz's exam- Moderately positive along the lateral joint line, no click.  Effusion- Moderate  Stable opening on varus and valgus stress at 0 and 30  Moderate valgus alignment.  Active patellar compression test- Positive    Left hip-  Logroll exam- negative for pain  [facility] exam- negative for pain.  Maximal tenderness to palpation over trochanteric bursa.  Flexion- 4+/5 strength  Extension- 4+/5 strength  Internal Rotation- 4+/5 strength  External Rotation- 4+/5 " strength  Laura's test- Positive  Straight leg raise- Negative, left lower extremity.    Imaging:  Right Knee X-Ray  Indication: Pain    AP, Lateral, and [place] views    Findings:  No fracture  No bony lesion  Normal soft tissues  Moderate tricompartmental osteoarthritis with predominant joint space narrowing in the patellofemoral joint especially laterally with mild reactive osteophyte formation noted, slight valgus alignment appreciated.  Compared to prior office x-rays    Assessment:        1. Primary osteoarthritis of right knee    2. Trochanteric bursitis of left hip           Plan:  Large Joint Arthrocentesis: R knee  Date/Time: 11/3/2022 12:40 PM  Consent given by: patient  Site marked: site marked  Timeout: Immediately prior to procedure a time out was called to verify the correct patient, procedure, equipment, support staff and site/side marked as required   Supporting Documentation  Indications: pain   Procedure Details  Location: knee - R knee  Preparation: Patient was prepped and draped in the usual sterile fashion  Needle size: 22 G  Approach: superior (lateral)  Medications administered: 80 mg triamcinolone acetonide 40 MG/ML; 8 mL lidocaine PF 1% 1 %  Patient tolerance: patient tolerated the procedure well with no immediate complications    Large Joint Arthrocentesis: L greater trochanteric bursa  Date/Time: 11/3/2022 12:40 PM  Consent given by: patient  Site marked: site marked  Timeout: Immediately prior to procedure a time out was called to verify the correct patient, procedure, equipment, support staff and site/side marked as required   Supporting Documentation  Indications: pain   Procedure Details  Location: hip - L greater trochanteric bursa  Preparation: Patient was prepped and draped in the usual sterile fashion  Needle size: 22 G  Approach: lateral  Medications administered: 80 mg triamcinolone acetonide 40 MG/ML; 4 mL lidocaine PF 1% 1 %  Patient tolerance: patient tolerated the procedure  well with no immediate complications                1. Discussed treatment options at length with patient at today's visit.    2. At this point in time, she would like to hold off on surgical treatment and plan to proceed with repeat injection to her right knee and left hip at this time.     3. Patient would like to proceed with cortisone injection today to the right knee and left trochanteric bursa. Recommended limited use of affected extremity for the next 24 hours to only essential activites other than work on general active and passive motion. Recommended supplementing with ice and soft tissue massage. Discussed with patient that they should see results in 5-7 days, if no improvement in 5-6 weeks I have asked them to call the office to review other options. Patient should call office immediately if they notice redness, warmth, fevers, chills, or residual numbness or tingling for greater than 6 hours after injection.     4. Continue with hip, core, and quad strengthening exercises bilaterally.    5. Follow-up 3 months for reevaluation.        Zoila Delgado was in agreement with plan and had all questions answered.     Orders:  Orders Placed This Encounter   Procedures   • Large Joint Arthrocentesis: R knee   • Large Joint Arthrocentesis: L greater trochanteric bursa   • XR Knee 3 View Right       Medications:  No orders of the defined types were placed in this encounter.      Followup:  No follow-ups on file.    Diagnoses and all orders for this visit:    1. Primary osteoarthritis of right knee (Primary)  -     XR Knee 3 View Right  -     Large Joint Arthrocentesis: R knee    2. Trochanteric bursitis of left hip  -     Large Joint Arthrocentesis: L greater trochanteric bursa      Transcribed from ambient dictation for Mariano Yee MD by Anahi Rojas.  11/03/22   13:51 EDT    Patient or patient representative verbalized consent to the visit recording.  I have personally performed the services described in  this document as transcribed by the above individual, and it is both accurate and complete.

## 2022-11-06 RX ORDER — LIDOCAINE HYDROCHLORIDE 10 MG/ML
8 INJECTION, SOLUTION EPIDURAL; INFILTRATION; INTRACAUDAL; PERINEURAL
Status: COMPLETED | OUTPATIENT
Start: 2022-11-03 | End: 2022-11-03

## 2022-11-06 RX ORDER — TRIAMCINOLONE ACETONIDE 40 MG/ML
80 INJECTION, SUSPENSION INTRA-ARTICULAR; INTRAMUSCULAR
Status: COMPLETED | OUTPATIENT
Start: 2022-11-03 | End: 2022-11-03

## 2022-11-06 RX ORDER — LIDOCAINE HYDROCHLORIDE 10 MG/ML
4 INJECTION, SOLUTION EPIDURAL; INFILTRATION; INTRACAUDAL; PERINEURAL
Status: COMPLETED | OUTPATIENT
Start: 2022-11-03 | End: 2022-11-03

## 2022-11-07 ENCOUNTER — HOSPITAL ENCOUNTER (OUTPATIENT)
Dept: CARDIOLOGY | Facility: HOSPITAL | Age: 62
Discharge: HOME | End: 2022-11-07
Admitting: INTERNAL MEDICINE

## 2022-11-07 VITALS
HEART RATE: 52 BPM | SYSTOLIC BLOOD PRESSURE: 130 MMHG | BODY MASS INDEX: 19.61 KG/M2 | HEIGHT: 66 IN | WEIGHT: 122 LBS | DIASTOLIC BLOOD PRESSURE: 80 MMHG

## 2022-11-07 DIAGNOSIS — Z13.9 SCREENING DUE: ICD-10-CM

## 2022-11-07 LAB
BH CV ECHO MEAS - DIST AO DIAM: 1.63 CM
BH CV VAS BP LEFT ARM: NORMAL MMHG
BH CV VAS BP RIGHT ARM: NORMAL MMHG
BH CV XLRA MEAS - MID AO DIAM: 1.73 CM
BH CV XLRA MEAS - PAD LEFT ABI DP: 1.28
BH CV XLRA MEAS - PAD LEFT ABI PT: 1.29
BH CV XLRA MEAS - PAD LEFT ARM: 126 MMHG
BH CV XLRA MEAS - PAD LEFT LEG DP: 166 MMHG
BH CV XLRA MEAS - PAD LEFT LEG PT: 168 MMHG
BH CV XLRA MEAS - PAD RIGHT ABI DP: 1.29
BH CV XLRA MEAS - PAD RIGHT ABI PT: 1.28
BH CV XLRA MEAS - PAD RIGHT ARM: 130 MMHG
BH CV XLRA MEAS - PAD RIGHT LEG DP: 168 MMHG
BH CV XLRA MEAS - PAD RIGHT LEG PT: 166 MMHG
BH CV XLRA MEAS - PROX AO DIAM: 2.17 CM
BH CV XLRA MEAS LEFT ICA/CCA RATIO: 1.32
BH CV XLRA MEAS LEFT MID CCA PSV: NORMAL CM/SEC
BH CV XLRA MEAS LEFT MID ICA PSV: NORMAL CM/SEC
BH CV XLRA MEAS LEFT PROX ECA PSV: NORMAL CM/SEC
BH CV XLRA MEAS RIGHT ICA/CCA RATIO: 1
BH CV XLRA MEAS RIGHT MID CCA PSV: NORMAL CM/SEC
BH CV XLRA MEAS RIGHT MID ICA PSV: NORMAL CM/SEC
BH CV XLRA MEAS RIGHT PROX ECA PSV: NORMAL CM/SEC
MAXIMAL PREDICTED HEART RATE: 156 BPM
STRESS TARGET HR: 133 BPM

## 2022-11-07 PROCEDURE — 93799 UNLISTED CV SVC/PROCEDURE: CPT

## 2022-11-08 ENCOUNTER — HOSPITAL ENCOUNTER (OUTPATIENT)
Dept: PHYSICAL THERAPY | Facility: HOSPITAL | Age: 62
Setting detail: THERAPIES SERIES
Discharge: HOME | End: 2022-11-08

## 2022-11-08 DIAGNOSIS — M54.42 ACUTE LEFT-SIDED LOW BACK PAIN WITH LEFT-SIDED SCIATICA: Primary | ICD-10-CM

## 2022-11-08 DIAGNOSIS — M54.50 CHRONIC LOW BACK PAIN, UNSPECIFIED BACK PAIN LATERALITY, UNSPECIFIED WHETHER SCIATICA PRESENT: ICD-10-CM

## 2022-11-08 DIAGNOSIS — G89.29 CHRONIC LOW BACK PAIN, UNSPECIFIED BACK PAIN LATERALITY, UNSPECIFIED WHETHER SCIATICA PRESENT: ICD-10-CM

## 2022-11-08 PROCEDURE — 97110 THERAPEUTIC EXERCISES: CPT

## 2022-11-08 NOTE — THERAPY TREATMENT NOTE
Outpatient Physical Therapy Ortho Treatment Note  [facility]     Patient Name: Zoila Delgado  : 1960  MRN: [id number]  Today's Date: 2022      Visit Date: 2022    Visit Dx:    ICD-10-CM ICD-9-CM   1. Acute left-sided low back pain with left-sided sciatica  M54.42 724.2     724.3   2. Chronic low back pain, unspecified back pain laterality, unspecified whether sciatica present  M54.50 724.2    G89.29 338.29       Patient Active Problem List   Diagnosis   • Patellofemoral pain syndrome   • Knee crepitus   • Chondromalacia, knee   • Gastroesophageal reflux disease without esophagitis   • Monsivais's esophagus without dysplasia   • Primary osteoarthritis of right knee   • Constipation   • Gastroparesis        Past Medical History:   Diagnosis Date   • Ankle sprain    • Arthritis    • Monsivais esophagus    • Cervical disc disorder    • Diverticular disease    • Diverticulitis    • Dyspepsia    • Gastritis    • GERD (gastroesophageal reflux disease)    • HH (hiatus hernia)    • Hyperlipidemia    • Low back pain    • Perirectal abscess    • Renal cyst     left   • Rotator cuff syndrome    • Shoulder injury         Past Surgical History:   Procedure Laterality Date   • COLONOSCOPY  2015    mohamud,nirc   • ENDOMETRIAL ABLATION     • ENDOSCOPY  2015    HH, Z-line irregular, 42 cm. fromincisors, chronic inflammation   • ENDOSCOPY N/A 2019    [facility], HH   • TONSILECTOMY, ADENOIDECTOMY, BILATERAL MYRINGOTOMY AND TUBES     • TONSILLECTOMY  1968                        PT Assessment/Plan     Row Name 22 0800          PT Assessment    Assessment Comments Progressed hip strengthening this session for improved hip and low back stability. Patient reports significant muscle fatigue. Progressed HEP with additional exercises as well as previous stretches.  -LW        PT Plan    PT Plan Comments Assess response and progres strength and stability as tolerated. Consider straight leg deadlift,  leg press  -LW           User Key  (r) = Recorded By, (t) = Taken By, (c) = Cosigned By    Initials Name Provider Type    Inez Dorado, PT Physical Therapist                   OP Exercises     Row Name 11/08/22 0800             Subjective Comments    Subjective Comments She got injections in her hip and knee and the one in her hip and her pain is almost completely gone.  -LW         Subjective Pain    Able to rate subjective pain? yes  -LW      Pre-Treatment Pain Level 0  -LW         Total Minutes    07962 - PT Therapeutic Exercise Minutes 40  -LW         Exercise 1    Exercise Name 1 Nustep  -LW      Reps 1 5 min L5  -LW         Exercise 2    Exercise Name 2 HS stretch  -LW      Cueing 2 Verbal;Demo  -LW      Reps 2 3  -LW      Time 2 20 sec  -LW         Exercise 3    Exercise Name 3 piriformis stretch  -LW      Reps 3 3  -LW      Time 3 20 sec  -LW         Exercise 4    Exercise Name 4 Bridges: progressed DL>SL  -LW      Cueing 4 Verbal;Tactile;Demo  -LW      Sets 4 3 B  -LW      Reps 4 15  -LW         Exercise 5    Exercise Name 5 sl lateral hip circuit: clam, abd, hip circles  -LW      Cueing 5 Verbal;Tactile;Demo  -LW      Sets 5 2 B  -LW      Reps 5 15 ea  -LW         Exercise 6    Exercise Name 6 Prone hip ext  -LW      Cueing 6 Verbal;Tactile  -LW      Sets 6 2  -LW      Reps 6 10  -LW      Time 6 3#, pillow under hips  -LW         Exercise 7    Exercise Name 7 Side steps and monster walks  -LW      Cueing 7 Verbal;Demo  -LW      Sets 7 1  -LW      Reps 7 3 laps  -LW      Time 7 GTB  -LW            User Key  (r) = Recorded By, (t) = Taken By, (c) = Cosigned By    Initials Name Provider Type    Inez Dorado, PT Physical Therapist                              PT OP Goals     Row Name 11/08/22 0800          PT Short Term Goals    STG Date to Achieve 11/15/22  -LW     STG 1 Patient will be independent with education for symptom management and initial HEP to decrease LBP pain.  -LW     STG 1 Progress  Ongoing  -     STG 2 Pt will improve B LE strength to at least 4+/5.  -     STG 2 Progress Ongoing  -        Long Term Goals    LTG Date to Achieve 11/29/22  -     LTG 1 Patient will be independent with education for symptom management and advanced HEP to decrease LBP pain.  -     LTG 1 Progress Ongoing  -     LTG 2 Patient will be able to participate in regular leisure acitivty such as ambulating 5 miles or riding her Peloton without any inc'd LBP or LLE pain.  -     LTG 2 Progress Ongoing  -     LTG 3 Patient will be able to sit and drive for > 1 hour without inc'd pain or symptoms.  -     LTG 3 Progress Ongoing  -           User Key  (r) = Recorded By, (t) = Taken By, (c) = Cosigned By    Initials Name Provider Type    Inez Dorado, PT Physical Therapist                               Time Calculation:   Start Time: 0811  Stop Time: 0851  Time Calculation (min): 40 min  Total Timed Code Minutes- PT: 40 minute(s)  Timed Charges  61681 - PT Therapeutic Exercise Minutes: 40  Total Minutes  Timed Charges Total Minutes: 40   Total Minutes: 40  Therapy Charges for Today     Code Description Service Date Service Provider Modifiers Qty    87394934834 HC PT THER PROC EA 15 MIN 11/8/2022 Inez Tripathi, PT GP 3                    Inez Tripathi PT  11/8/2022

## 2022-11-29 ENCOUNTER — HOSPITAL ENCOUNTER (OUTPATIENT)
Dept: PHYSICAL THERAPY | Facility: HOSPITAL | Age: 62
Setting detail: THERAPIES SERIES
Discharge: HOME | End: 2022-11-29

## 2022-11-29 DIAGNOSIS — M54.42 ACUTE LEFT-SIDED LOW BACK PAIN WITH LEFT-SIDED SCIATICA: Primary | ICD-10-CM

## 2022-11-29 PROCEDURE — 97110 THERAPEUTIC EXERCISES: CPT

## 2022-11-29 NOTE — THERAPY DISCHARGE NOTE
Outpatient Physical Therapy Ortho Treatment Note/Discharge Summary  [facility]     Patient Name: Zoila Delgado  : 1960  MRN: [id number]  Today's Date: 2022      Visit Date: 2022    Visit Dx:    ICD-10-CM ICD-9-CM   1. Acute left-sided low back pain with left-sided sciatica  M54.42 724.2     724.3       Patient Active Problem List   Diagnosis   • Patellofemoral pain syndrome   • Knee crepitus   • Chondromalacia, knee   • Gastroesophageal reflux disease without esophagitis   • Monsivais's esophagus without dysplasia   • Primary osteoarthritis of right knee   • Constipation   • Gastroparesis        Past Medical History:   Diagnosis Date   • Ankle sprain    • Arthritis    • Monsivais esophagus    • Cervical disc disorder    • Diverticular disease    • Diverticulitis    • Dyspepsia    • Gastritis    • GERD (gastroesophageal reflux disease)    • HH (hiatus hernia)    • Hyperlipidemia    • Low back pain    • Perirectal abscess    • Renal cyst     left   • Rotator cuff syndrome    • Shoulder injury         Past Surgical History:   Procedure Laterality Date   • COLONOSCOPY  2015    balbina mallory   • ENDOMETRIAL ABLATION     • ENDOSCOPY  2015    HH, Z-line irregular, 42 cm. fromincisors, chronic inflammation   • ENDOSCOPY N/A 2019    [facility], HH   • TONSILECTOMY, ADENOIDECTOMY, BILATERAL MYRINGOTOMY AND TUBES     • TONSILLECTOMY  1968                        PT Assessment/Plan     Row Name 22 1100          PT Assessment    Assessment Comments Pt tolerates session well this date. Progress hip and core strengthening, pt demo's good form throughout session. Unable to tolerate lateral lunge d/t R knee pain, modified to perform lateral step ups with good tolerance. Pt feels comfortable with HEP and ability to self manage at this time. Ready for discharge.  -DB        PT Plan    PT Plan Comments Discharge to HEP  -DB           User Key  (r) = Recorded By, (t) = Taken By, (c) = Cosigned  "By    Initials Name Provider Type    DB Zena Elaine, PT Physical Therapist                     OP Exercises     Row Name 11/29/22 1000             Subjective Comments    Subjective Comments Ever since she recieved injection, her back feels much better.  -DB         Subjective Pain    Able to rate subjective pain? yes  -DB      Pre-Treatment Pain Level 0  -DB         Total Minutes    03879 - PT Therapeutic Exercise Minutes 40  -DB         Exercise 1    Exercise Name 1 Nustep  -DB      Reps 1 5 min L5  -DB         Exercise 2    Exercise Name 2 HS stretch  -DB      Cueing 2 Verbal;Demo  -DB      Reps 2 3  -DB      Time 2 20 sec  -DB      Additional Comments at step  -DB         Exercise 4    Exercise Name 4 SL bridges  -DB      Cueing 4 Verbal;Tactile  -DB      Sets 4 2  -DB      Reps 4 15  -DB         Exercise 5    Exercise Name 5 SL hip abd series (fwd/back, up/down, CW, CCW)  -DB      Cueing 5 Verbal;Tactile  -DB      Reps 5 15 ea direction  -DB         Exercise 7    Exercise Name 7 Side steps and monster walks  -DB      Cueing 7 Verbal;Demo  -DB      Sets 7 1  -DB      Reps 7 3 laps  -DB      Time 7 GTB  -DB         Exercise 8    Exercise Name 8 fwd + lateral step ups  -DB      Cueing 8 Verbal;Demo  -DB      Sets 8 2  -DB      Reps 8 10  -DB      Time 8 6\" + foam  -DB         Exercise 9    Exercise Name 9 DL and SL leg press  -DB      Cueing 9 Verbal  -DB      Sets 9 3 (2 DL, 1 SL)  -DB      Reps 9 15  -DB      Additional Comments 160# DL, 60# SL  -DB         Exercise 10    Exercise Name 10 deadlifts at cable column  -DB      Cueing 10 Verbal;Demo  -DB      Sets 10 2  -DB      Reps 10 10  -DB      Time 10 70#  -DB            User Key  (r) = Recorded By, (t) = Taken By, (c) = Cosigned By    Initials Name Provider Type    DB Zena Elaine, PT Physical Therapist                                PT OP Goals     Row Name 11/29/22 1100          PT Short Term Goals    STG Date to Achieve 11/15/22  -DB     STG 1 " Patient will be independent with education for symptom management and initial HEP to decrease LBP pain.  -DB     STG 1 Progress Met  -DB     STG 2 Pt will improve B LE strength to at least 4+/5.  -DB     STG 2 Progress Ongoing  -DB        Long Term Goals    LTG Date to Achieve 11/29/22  -DB     LTG 1 Patient will be independent with education for symptom management and advanced HEP to decrease LBP pain.  -DB     LTG 1 Progress Met  -DB     LTG 2 Patient will be able to participate in regular leisure acitivty such as ambulating 5 miles or riding her Peloton without any inc'd LBP or LLE pain.  -DB     LTG 2 Progress Ongoing  -DB     LTG 3 Patient will be able to sit and drive for > 1 hour without inc'd pain or symptoms.  -DB     LTG 3 Progress Ongoing  -DB           User Key  (r) = Recorded By, (t) = Taken By, (c) = Cosigned By    Initials Name Provider Type    DB Zena Elaine, PT Physical Therapist                               Time Calculation:   Start Time: 1045  Stop Time: 1125  Time Calculation (min): 40 min  Total Timed Code Minutes- PT: 40 minute(s)  Timed Charges  65557 - PT Therapeutic Exercise Minutes: 40  Total Minutes  Timed Charges Total Minutes: 40   Total Minutes: 40  Therapy Charges for Today     Code Description Service Date Service Provider Modifiers Qty    91423903311 HC PT THER PROC EA 15 MIN 11/29/2022 Zena Elaine, PT GP 3                       Zena Elaine PT  11/29/2022

## 2022-12-27 ENCOUNTER — APPOINTMENT (OUTPATIENT)
Dept: CARDIOLOGY | Facility: HOSPITAL | Age: 62
End: 2022-12-27

## 2023-01-26 ENCOUNTER — PATIENT MESSAGE (OUTPATIENT)
Dept: SPORTS MEDICINE | Facility: CLINIC | Age: 63
End: 2023-01-26
Payer: COMMERCIAL

## 2023-01-27 RX ORDER — METHYLPREDNISOLONE 4 MG/1
TABLET ORAL
Qty: 21 TABLET | Refills: 0 | Status: SHIPPED | OUTPATIENT
Start: 2023-01-27 | End: 2023-03-16 | Stop reason: SDUPTHER

## 2023-01-27 NOTE — TELEPHONE ENCOUNTER
From: Zoila Delgado  To: Felix Owen  Sent: 1/26/2023 5:35 PM EST  Subject: Sciatic nerve aching in my bottom    Last year when this was bothering me, you gave me a round of steroids. That worked to take the inflammation out of my piriformis . Would you refill my prescription ? Thanks

## 2023-01-31 ENCOUNTER — OFFICE VISIT (OUTPATIENT)
Dept: ORTHOPEDIC SURGERY | Facility: CLINIC | Age: 63
End: 2023-01-31
Payer: COMMERCIAL

## 2023-01-31 VITALS — WEIGHT: 125 LBS | BODY MASS INDEX: 20.09 KG/M2 | HEIGHT: 66 IN

## 2023-01-31 DIAGNOSIS — M51.36 DDD (DEGENERATIVE DISC DISEASE), LUMBAR: ICD-10-CM

## 2023-01-31 DIAGNOSIS — M54.40 BACK PAIN OF LUMBAR REGION WITH SCIATICA: ICD-10-CM

## 2023-01-31 DIAGNOSIS — M70.62 TROCHANTERIC BURSITIS OF LEFT HIP: ICD-10-CM

## 2023-01-31 DIAGNOSIS — M17.11 PRIMARY OSTEOARTHRITIS OF RIGHT KNEE: Primary | ICD-10-CM

## 2023-01-31 DIAGNOSIS — M51.16 LUMBAR DISC DISEASE WITH RADICULOPATHY: ICD-10-CM

## 2023-01-31 PROBLEM — M51.369 DDD (DEGENERATIVE DISC DISEASE), LUMBAR: Status: ACTIVE | Noted: 2023-01-31

## 2023-01-31 PROCEDURE — 20610 DRAIN/INJ JOINT/BURSA W/O US: CPT | Performed by: NURSE PRACTITIONER

## 2023-01-31 PROCEDURE — 99214 OFFICE O/P EST MOD 30 MIN: CPT | Performed by: NURSE PRACTITIONER

## 2023-01-31 PROCEDURE — 72100 X-RAY EXAM L-S SPINE 2/3 VWS: CPT | Performed by: NURSE PRACTITIONER

## 2023-01-31 RX ORDER — ROSUVASTATIN CALCIUM 20 MG/1
TABLET, COATED ORAL
COMMUNITY
Start: 2022-12-29

## 2023-01-31 RX ADMIN — TRIAMCINOLONE ACETONIDE 80 MG: 40 INJECTION, SUSPENSION INTRA-ARTICULAR; INTRAMUSCULAR at 09:30

## 2023-01-31 RX ADMIN — LIDOCAINE HYDROCHLORIDE 4 ML: 10 INJECTION, SOLUTION EPIDURAL; INFILTRATION; INTRACAUDAL; PERINEURAL at 09:29

## 2023-01-31 RX ADMIN — TRIAMCINOLONE ACETONIDE 80 MG: 40 INJECTION, SUSPENSION INTRA-ARTICULAR; INTRAMUSCULAR at 09:29

## 2023-01-31 RX ADMIN — LIDOCAINE HYDROCHLORIDE 8 ML: 10 INJECTION, SOLUTION EPIDURAL; INFILTRATION; INTRACAUDAL; PERINEURAL at 09:30

## 2023-02-02 RX ORDER — LIDOCAINE HYDROCHLORIDE 10 MG/ML
4 INJECTION, SOLUTION EPIDURAL; INFILTRATION; INTRACAUDAL; PERINEURAL
Status: COMPLETED | OUTPATIENT
Start: 2023-01-31 | End: 2023-01-31

## 2023-02-02 RX ORDER — TRIAMCINOLONE ACETONIDE 40 MG/ML
80 INJECTION, SUSPENSION INTRA-ARTICULAR; INTRAMUSCULAR
Status: COMPLETED | OUTPATIENT
Start: 2023-01-31 | End: 2023-01-31

## 2023-02-02 RX ORDER — LIDOCAINE HYDROCHLORIDE 10 MG/ML
8 INJECTION, SOLUTION EPIDURAL; INFILTRATION; INTRACAUDAL; PERINEURAL
Status: COMPLETED | OUTPATIENT
Start: 2023-01-31 | End: 2023-01-31

## 2023-03-08 ENCOUNTER — TELEPHONE (OUTPATIENT)
Dept: ORTHOPEDIC SURGERY | Facility: CLINIC | Age: 63
End: 2023-03-08
Payer: MEDICARE

## 2023-03-08 NOTE — TELEPHONE ENCOUNTER
----- Message from Zoila Delgado sent at 3/8/2023  1:53 PM EST -----  Regarding: Right knee injection   Contact: [phone]  I just received a call from your office to schedule my jell injection for my knee. I was sort of blindsided by the call. I just received a cortisone injection in my knee and hip January 31, Beverley gave them to me. I’m not due an injection until the end of April. I know we talked about jell maybe helping my knee. Are you recommending that I try it again for my next injection? Do I need to wait until the end of April?  If it doesn’t help, how long will I have to wait for the next injection? Lots if questions. Just let me know what you think.

## 2023-03-08 NOTE — TELEPHONE ENCOUNTER
Called and spoke with patient Beverley Zhao SYD ordered the visco injection for her R knee at her 01.31.2023 visit- the medication has been delivered to the clinic. Patient reports her R knee is doing well from the cortisone 01.31.2023, advised it is ok we will hold her visco injection in our locked cabinet and if/when she is ready we are happy to schedule her as an injection only R knee (speciality pharmacy provided injection). We did discuss that patient can receive cortisone every 3 months and the visco every 6 months 1 day, cortisone can be given 6 weeks post visco injection should it be needed. Patient aware and agreeable and will contact the clinic when/ if she is ready to schedule.    Thanks so much.

## 2023-03-16 ENCOUNTER — PATIENT MESSAGE (OUTPATIENT)
Dept: ORTHOPEDIC SURGERY | Facility: CLINIC | Age: 63
End: 2023-03-16
Payer: MEDICARE

## 2023-03-16 RX ORDER — METHYLPREDNISOLONE 4 MG/1
TABLET ORAL
Qty: 21 TABLET | Refills: 0 | Status: SHIPPED | OUTPATIENT
Start: 2023-03-16 | End: 2023-03-17

## 2023-03-17 RX ORDER — METHYLPREDNISOLONE 4 MG/1
TABLET ORAL
Qty: 21 TABLET | Refills: 0 | Status: SHIPPED | OUTPATIENT
Start: 2023-03-17 | End: 2023-03-24

## 2023-03-24 ENCOUNTER — CLINICAL SUPPORT (OUTPATIENT)
Dept: ORTHOPEDIC SURGERY | Facility: CLINIC | Age: 63
End: 2023-03-24
Payer: MEDICARE

## 2023-03-24 VITALS — BODY MASS INDEX: 20.09 KG/M2 | HEIGHT: 66 IN | WEIGHT: 125 LBS

## 2023-03-24 DIAGNOSIS — M17.11 PRIMARY OSTEOARTHRITIS OF RIGHT KNEE: Primary | ICD-10-CM

## 2023-03-24 DIAGNOSIS — M70.62 TROCHANTERIC BURSITIS OF LEFT HIP: ICD-10-CM

## 2023-03-24 PROCEDURE — 20610 DRAIN/INJ JOINT/BURSA W/O US: CPT | Performed by: NURSE PRACTITIONER

## 2023-03-24 NOTE — PROGRESS NOTES
Large Joint Arthrocentesis: R knee  Date/Time: 3/24/2023 8:35 AM  Consent given by: patient  Site marked: site marked  Timeout: Immediately prior to procedure a time out was called to verify the correct patient, procedure, equipment, support staff and site/side marked as required   Supporting Documentation  Indications: pain   Procedure Details  Location: knee - R knee  Preparation: Patient was prepped and draped in the usual sterile fashion  Needle gauge: 21g.  Approach: anterolateral  Medications administered: 88 mg Hyaluronan 88 MG/4ML  Patient tolerance: patient tolerated the procedure well with no immediate complications      Patient presents to clinic today for right knee viscosupplement injections.  This is the single injection of the series.  I explained details of injections as well as risks, benefits and alternatives with the patient today, had all questions answered, wished to proceed with injections.  I will see patient back in 6 weeks for re-evaluation. Patient was instructed to watch for signs or symptoms of infection including redness, swelling, warmth to the touch, or significant increased pain and to contact our office immediately if any of these issues were noted.

## 2023-04-27 RX ORDER — MELOXICAM 15 MG/1
15 TABLET ORAL DAILY
Qty: 30 TABLET | Refills: 2 | Status: SHIPPED | OUTPATIENT
Start: 2023-04-27

## 2023-05-10 ENCOUNTER — OFFICE VISIT (OUTPATIENT)
Dept: ORTHOPEDIC SURGERY | Facility: CLINIC | Age: 63
End: 2023-05-10
Payer: MEDICARE

## 2023-05-10 VITALS — BODY MASS INDEX: 20.09 KG/M2 | WEIGHT: 125 LBS | HEIGHT: 66 IN

## 2023-05-10 DIAGNOSIS — M17.11 PRIMARY OSTEOARTHRITIS OF RIGHT KNEE: Primary | ICD-10-CM

## 2023-05-10 RX ADMIN — TRIAMCINOLONE ACETONIDE 80 MG: 40 INJECTION, SUSPENSION INTRA-ARTICULAR; INTRAMUSCULAR at 11:32

## 2023-05-10 RX ADMIN — LIDOCAINE HYDROCHLORIDE 8 ML: 10 INJECTION, SOLUTION EPIDURAL; INFILTRATION; INTRACAUDAL; PERINEURAL at 11:32

## 2023-05-10 NOTE — PROGRESS NOTES
Subjective:     Patient ID: Zoila Delgado is a 63 y.o. female.    Chief Complaint:  Follow-up DJD right knee  Visco injection 3/24/2023  Corticosteroid injection right knee 1/31/2023  History of Present Illness  Zoila Delgado Returns to clinic for follow-up of her right knee.  She has continued to experience pain along the knee, pain present anteriorly but also present along the lateral joint line.  She received viscosupplementation injections approximately 6 weeks ago, Monovisc without any significant symptom improvement.  She has received viscosupplementation injections in past 2016 with greater relief, Orthovisc series of 3 which was used at that time she did note significant symptom improvement.  At this time is not experiencing any pain to report along the lateral aspect of the left hip.  Pain that is present along the right knee is also radiating down the tib-fib, laterally.  Denies known injury during the has continued weightbearing and is noted again greater improvement with prior corticosteroid injections.  Denies any other concerns present.    Social History     Occupational History   • Occupation: RETIRED    Tobacco Use   • Smoking status: Never   • Smokeless tobacco: Never   Vaping Use   • Vaping Use: Never used   Substance and Sexual Activity   • Alcohol use: No   • Drug use: No   • Sexual activity: Yes     Partners: Female     Birth control/protection: None      Past Medical History:   Diagnosis Date   • Ankle sprain    • Arthritis    • Arthritis of back Jan 22    Dr. Owen   • Monsivais esophagus    • Cervical disc disorder    • Diverticular disease    • Diverticulitis    • Dyspepsia    • Gastritis    • GERD (gastroesophageal reflux disease)    • HH (hiatus hernia)    • Hip arthrosis July 27 2022    Dr Yee did Mri   • Hyperlipidemia    • Knee swelling Several years ago    Dr. Yee   • Low back pain    • Lumbar disc disease with radiculopathy 01/31/2023   • Perirectal abscess    • Renal cyst      "left   • Rotator cuff syndrome    • Shoulder injury    • Trochanteric bursitis of left hip 01/31/2023     Past Surgical History:   Procedure Laterality Date   • COLONOSCOPY  11/30/2015    [id number],nirc   • ENDOMETRIAL ABLATION     • ENDOSCOPY  11/30/2015    HH, Z-line irregular, 42 cm. fromincisors, chronic inflammation   • ENDOSCOPY N/A 05/07/2019    Monsivais's, HH   • TONSILECTOMY, ADENOIDECTOMY, BILATERAL MYRINGOTOMY AND TUBES     • TONSILLECTOMY  1968       Family History   Problem Relation Age of Onset   • Cancer Mother    • No Known Problems Father    • Breast cancer Sister    • Stroke Maternal Grandmother    • Cancer Sister                Objective:  Physical Exam    General: No acute distress.  Eyes: conjunctiva clear; pupils equally round and reactive  ENT: external ears and nose atraumatic; oropharynx clear  CV: no peripheral edema  Resp: normal respiratory effort  Skin: no rashes or wounds; normal turgor  Psych: mood and affect appropriate; recent and remote memory intact    Vitals:    05/10/23 1049   Weight: 56.7 kg (125 lb)   Height: 166.4 cm (65.5\")         05/10/23  1049   Weight: 56.7 kg (125 lb)     Body mass index is 20.48 kg/m².      Ortho Exam     Right knee examined  Positive tenderness along the lateral joint line, patellofemoral  Knee range of motion 0 to 130 degrees  Stable to varus and valgus stress at 0 degrees and 30 degrees  Positive active patellar compression test  Positive crepitus throughout arc of motion  Negative medial and lateral Lizz's  Positive tenderness to palpate along the proximal tibia, anterior lateral down to the ankle  Quad strength 4+ out of 5  Negative logroll exam, negative Stinchfield exam  Positive sensation light touch all distributions right lower extremity      Assessment:        1. Primary osteoarthritis of right knee           Plan:  1.  Discussed plan of care with patient.  Discussed proceeding with corticosteroid injection right knee anterior lateral approach " which she does wish to proceed with.  I also recommend continue strengthening stretching exercises including calf strengthening and stretching hamstring stretching and strengthening quad strengthening exercises.  Discussed foam roller use to the anterior and lateral aspect of the right lower extremity.  Also discussed options including dry needling but will hold off at this time try foam rolling first as well as soft tissue massage.  We will plan to follow-up with her in 3 months as needed we will gladly see her sooner/if the hip becomes more symptomatic.  All questions answered today's visit.  Large Joint Arthrocentesis: R knee  Date/Time: 5/10/2023 11:32 AM  Consent given by: patient  Site marked: site marked  Timeout: Immediately prior to procedure a time out was called to verify the correct patient, procedure, equipment, support staff and site/side marked as required   Supporting Documentation  Indications: pain   Procedure Details  Location: knee - R knee  Preparation: Patient was prepped and draped in the usual sterile fashion  Needle size: 22 G  Approach: anterolateral  Medications administered: 80 mg triamcinolone acetonide 40 MG/ML; 8 mL lidocaine PF 1% 1 %  Patient tolerance: patient tolerated the procedure well with no immediate complications          Orders:  Orders Placed This Encounter   Procedures   • Large Joint Arthrocentesis: R knee     No orders of the defined types were placed in this encounter.            Dragon dictation utilized

## 2023-05-11 NOTE — PROGRESS NOTES
Returns to clinic for follow-up of her right knee. She has continued to experience pain along the knee, pain present anteriorly but also present along the lateral joint line. She received viscosupplementation injections approximately 6 weeks ago without any significant symptom improvement. At this time is not experiencing any pain to report along the lateral aspect of the left hip. Pain that is present along the right knee is also radiating down the tib-fib, laterally. Denies known injury during the has continued weightbearing and is noted again greater improvement with prior corticosteroid injections. Denies any other concerns present.    Right knee examined  Positive tenderness along the lateral joint line, patellofemoral  Knee range of motion 0 to 130 degrees  Stable to varus and valgus stress at 0 degrees and 30 degrees  Positive active patellar compression test  Positive crepitus throughout arc of motion  Negative medial and lateral Lizz's  Positive tenderness to palpate along the proximal tibia, anterior lateral down to the ankle  Quad strength 4+ out of 5  Negative logroll exam, negative Stinchfield exam  Positive sensation light touch all distributions right lower extremity    Plan:  1. Discussed plan of care with patient. Discussed proceeding with corticosteroid injection right knee anterior lateral approach which she does wish to proceed with. I also recommend continue strengthening stretching exercises including calf strengthening and stretching hamstring stretching and strengthening quad strengthening exercises. Discussed foam roller use to the anterior and lateral aspect of the right lower extremity. Also discussed options including dry needling but will hold off at this time try foam rolling first as well as soft tissue massage. We will plan to follow-up with her in 3 months as needed we will gladly see her sooner/if the hip becomes more symptomatic. All questions answered today's visit.

## 2023-05-12 RX ORDER — LIDOCAINE HYDROCHLORIDE 10 MG/ML
8 INJECTION, SOLUTION EPIDURAL; INFILTRATION; INTRACAUDAL; PERINEURAL
Status: COMPLETED | OUTPATIENT
Start: 2023-05-10 | End: 2023-05-10

## 2023-05-12 RX ORDER — TRIAMCINOLONE ACETONIDE 40 MG/ML
80 INJECTION, SUSPENSION INTRA-ARTICULAR; INTRAMUSCULAR
Status: COMPLETED | OUTPATIENT
Start: 2023-05-10 | End: 2023-05-10

## 2023-07-24 RX ORDER — MELOXICAM 15 MG/1
15 TABLET ORAL DAILY
Qty: 30 TABLET | Refills: 2 | Status: SHIPPED | OUTPATIENT
Start: 2023-07-24

## 2023-08-02 ENCOUNTER — OFFICE VISIT (OUTPATIENT)
Dept: ORTHOPEDIC SURGERY | Facility: CLINIC | Age: 63
End: 2023-08-02
Payer: MEDICARE

## 2023-08-02 VITALS — BODY MASS INDEX: 20.09 KG/M2 | HEIGHT: 66 IN | WEIGHT: 125 LBS

## 2023-08-02 DIAGNOSIS — M70.62 TROCHANTERIC BURSITIS OF LEFT HIP: Primary | ICD-10-CM

## 2023-08-02 RX ADMIN — LIDOCAINE HYDROCHLORIDE 4 ML: 10 INJECTION, SOLUTION EPIDURAL; INFILTRATION; INTRACAUDAL; PERINEURAL at 14:19

## 2023-08-02 RX ADMIN — TRIAMCINOLONE ACETONIDE 80 MG: 40 INJECTION, SUSPENSION INTRA-ARTICULAR; INTRAMUSCULAR at 14:19

## 2023-08-03 RX ORDER — LIDOCAINE HYDROCHLORIDE 10 MG/ML
4 INJECTION, SOLUTION EPIDURAL; INFILTRATION; INTRACAUDAL; PERINEURAL
Status: COMPLETED | OUTPATIENT
Start: 2023-08-02 | End: 2023-08-02

## 2023-08-03 RX ORDER — TRIAMCINOLONE ACETONIDE 40 MG/ML
80 INJECTION, SUSPENSION INTRA-ARTICULAR; INTRAMUSCULAR
Status: COMPLETED | OUTPATIENT
Start: 2023-08-02 | End: 2023-08-02

## 2023-08-30 ENCOUNTER — OFFICE VISIT (OUTPATIENT)
Dept: ORTHOPEDIC SURGERY | Facility: CLINIC | Age: 63
End: 2023-08-30
Payer: MEDICARE

## 2023-08-30 VITALS — WEIGHT: 125 LBS | BODY MASS INDEX: 20.09 KG/M2 | HEIGHT: 66 IN

## 2023-08-30 DIAGNOSIS — M17.11 PRIMARY OSTEOARTHRITIS OF RIGHT KNEE: Primary | ICD-10-CM

## 2023-08-30 DIAGNOSIS — M70.62 TROCHANTERIC BURSITIS OF LEFT HIP: ICD-10-CM

## 2023-08-30 RX ORDER — TRIAMCINOLONE ACETONIDE 40 MG/ML
80 INJECTION, SUSPENSION INTRA-ARTICULAR; INTRAMUSCULAR
Status: COMPLETED | OUTPATIENT
Start: 2023-08-30 | End: 2023-08-30

## 2023-08-30 RX ORDER — LIDOCAINE HYDROCHLORIDE 10 MG/ML
8 INJECTION, SOLUTION EPIDURAL; INFILTRATION; INTRACAUDAL; PERINEURAL
Status: COMPLETED | OUTPATIENT
Start: 2023-08-30 | End: 2023-08-30

## 2023-08-30 RX ORDER — PREDNISONE 10 MG/1
TABLET ORAL
Qty: 39 TABLET | Refills: 0 | Status: SHIPPED | OUTPATIENT
Start: 2023-08-30

## 2023-08-30 RX ADMIN — TRIAMCINOLONE ACETONIDE 80 MG: 40 INJECTION, SUSPENSION INTRA-ARTICULAR; INTRAMUSCULAR at 08:17

## 2023-08-30 RX ADMIN — LIDOCAINE HYDROCHLORIDE 8 ML: 10 INJECTION, SOLUTION EPIDURAL; INFILTRATION; INTRACAUDAL; PERINEURAL at 08:17

## 2023-08-30 NOTE — PROGRESS NOTES
Subjective:     Patient ID: Zoila Delgado is a 63 y.o. female.    Chief Complaint:  Follow-up from osteoarthritis right knee -corticosteroid injection 5/10/2023, mentation injection 3/24/2023  Greater trochanteric bursitis left hip -steroid injection 8/2/2023  History of Present Illness  Zoila Delgado returns to clinic for follow-up of her left hip.  She did receive corticosteroid injection last visit significant symptom improvement she has continued with external rotation stretching strengthening exercises.  She presents today for reevaluation of her right knee.  She is experiencing mild swelling she did workout this a.m.  She is continue to receive steroid injections which have been of greater benefit compared to the viscosupplementation injections.  Is continued quad strengthening exercises.  She is getting ready to leave for a long hiking trip and would like to be evaluated before she leaves.  Maximal tenderness present along the anterior aspect positive for popping crepitus with motion.  Denies that the knee is locking, catching or giving way.  Denies any concerns present.     Social History     Occupational History    Occupation: RETIRED    Tobacco Use    Smoking status: Never    Smokeless tobacco: Never   Vaping Use    Vaping Use: Never used   Substance and Sexual Activity    Alcohol use: No    Drug use: No    Sexual activity: Yes     Partners: Female     Birth control/protection: None      Past Medical History:   Diagnosis Date    Ankle sprain     Arthritis     Arthritis of back Jan 22    Dr. Owen    Monsivais esophagus     Cervical disc disorder     Diverticular disease     Diverticulitis     Dyspepsia     Gastritis     GERD (gastroesophageal reflux disease)     HH (hiatus hernia)     Hip arthrosis July 27 2022    Dr Yee did Mri    Hyperlipidemia     Knee swelling Several years ago    Dr. Yee    Low back pain     Lumbar disc disease with radiculopathy 01/31/2023    Perirectal abscess     Renal cyst  "    left    Rotator cuff syndrome     Shoulder injury     Trochanteric bursitis of left hip 01/31/2023     Past Surgical History:   Procedure Laterality Date    COLONOSCOPY  11/30/2015    tics,nibh    ENDOMETRIAL ABLATION      ENDOSCOPY  11/30/2015    HH, Z-line irregular, 42 cm. fromincisors, chronic inflammation    ENDOSCOPY N/A 05/07/2019    [facility]ivais's, HH    TONSILECTOMY, ADENOIDECTOMY, BILATERAL MYRINGOTOMY AND TUBES      TONSILLECTOMY  1968       Family History   Problem Relation Age of Onset    Cancer Mother     No Known Problems Father     Breast cancer Sister     Stroke Maternal Grandmother     Cancer Sister                Objective:  Physical Exam     General: No acute distress.  Eyes: conjunctiva clear; pupils equally round and reactive  ENT: external ears and nose atraumatic; oropharynx clear  CV: no peripheral edema  Resp: normal respiratory effort  Skin: no rashes or wounds; normal turgor  Psych: mood and affect appropriate; recent and remote memory intact    Vitals:    08/30/23 0805   Weight: 56.7 kg (125 lb)   Height: 166.4 cm (65.5\")         08/30/23  0805   Weight: 56.7 kg (125 lb)     Body mass index is 20.48 kg/mý.      Right Knee Exam     Tenderness   The patient is experiencing tenderness in the patella, medial joint line and lateral joint line.    Range of Motion   Extension:  0   Right knee flexion: 125.     Tests   Lizz:  Medial - negative Lateral - negative  Varus: negative Valgus: negative  Lachman:  Anterior - 1+      Patellar apprehension: positive    Other   Erythema: absent  Sensation: normal  Pulse: present  Swelling: mild    Comments:  Positive active patellar compression test  Positive crepitus or arc of motion      Left Hip Exam     Tenderness   The patient is experiencing tenderness in the greater trochanter.    Range of Motion   External rotation:  60     Muscle Strength   Abduction: 5/5   Adduction: 5/5   Flexion: 5/5              Assessment:        1. Trochanteric bursitis " of left hip    2. Primary osteoarthritis of right knee           Plan:    - Large Joint Arthrocentesis: R knee on 2023 8:17 AM  Indications: pain  Details: 22 G needle, anterolateral approach  Medications: 80 mg triamcinolone acetonide 40 MG/ML; 8 mL lidocaine PF 1% 1 %  Outcome: tolerated well, no immediate complications  Procedure, treatment alternatives, risks and benefits explained, specific risks discussed. Consent was given by the patient. Immediately prior to procedure a time out was called to verify the correct patient, procedure, equipment, support staff and site/side marked as required. Patient was prepped and draped in the usual sterile fashion.       Discussed plan of care.  We did discuss proceeding with corticosteroid injection anterior lateral approach right knee.  We also discussed starting oral prednisone taper.  Discussed oral prednisone when she is hiking to help manage pain and swelling.  We can repeat corticosteroid injection in 3 months.  Received corticosteroid injection of the greater trochanter 2020 23 can repeat at 3-month interval if needed.  All questions answered.  Orders:  Orders Placed This Encounter   Procedures    - Large Joint Arthrocentesis: R knee     New Medications Ordered This Visit   Medications    predniSONE (DELTASONE) 10 MG tablet     Si mg daily x 3 days, 40 mg daily x 3 days, 20 mg daily x 3 days, 10 mg daily x 3 days     Dispense:  39 tablet     Refill:  0           I ordered and reviewed the [facility] today.       Dragon dictation utilized  We encourage all our patients to maintain a healthy weight - a Body Mass Index of 20-25. This, along with regular exercise and a balanced diet can lower your risk of many illnesses such as diabetes, heart disease, and stroke.

## 2023-09-27 ENCOUNTER — TRANSCRIBE ORDERS (OUTPATIENT)
Dept: ADMINISTRATIVE | Facility: HOSPITAL | Age: 63
End: 2023-09-27
Payer: MEDICARE

## 2023-09-27 DIAGNOSIS — E04.1 THYROID NODULE: Primary | ICD-10-CM

## 2023-10-12 ENCOUNTER — HOSPITAL ENCOUNTER (OUTPATIENT)
Dept: ULTRASOUND IMAGING | Facility: HOSPITAL | Age: 63
Discharge: HOME | End: 2023-10-12
Admitting: INTERNAL MEDICINE
Payer: MEDICARE

## 2023-10-12 DIAGNOSIS — E04.1 THYROID NODULE: ICD-10-CM

## 2023-10-12 PROCEDURE — 76536 US EXAM OF HEAD AND NECK: CPT

## 2023-11-01 ENCOUNTER — HOSPITAL ENCOUNTER (OUTPATIENT)
Dept: CARDIOLOGY | Facility: HOSPITAL | Age: 63
Discharge: HOME | End: 2023-11-01
Payer: MEDICARE

## 2023-11-01 VITALS
HEART RATE: 75 BPM | WEIGHT: 122 LBS | BODY MASS INDEX: 19.61 KG/M2 | HEIGHT: 66 IN | DIASTOLIC BLOOD PRESSURE: 80 MMHG | SYSTOLIC BLOOD PRESSURE: 121 MMHG | RESPIRATION RATE: 16 BRPM | OXYGEN SATURATION: 95 %

## 2023-11-01 DIAGNOSIS — R06.02 SHORTNESS OF BREATH: ICD-10-CM

## 2023-11-01 DIAGNOSIS — R07.2 PRECORDIAL PAIN: Primary | ICD-10-CM

## 2023-11-01 DIAGNOSIS — R00.2 PALPITATIONS: ICD-10-CM

## 2023-11-01 LAB
ALBUMIN SERPL-MCNC: 4.7 G/DL (ref 3.5–5.2)
ALBUMIN/GLOB SERPL: 1.9 G/DL
ALP SERPL-CCNC: 67 U/L (ref 39–117)
ALT SERPL W P-5'-P-CCNC: 57 U/L (ref 1–33)
ANION GAP SERPL CALCULATED.3IONS-SCNC: 10.2 MMOL/L (ref 5–15)
AORTIC ARCH: 2.6 CM
AORTIC DIMENSIONLESS INDEX: 0.8 (DI)
ASCENDING AORTA: 2.8 CM
AST SERPL-CCNC: 53 U/L (ref 1–32)
BASOPHILS # BLD AUTO: 0.06 10*3/MM3 (ref 0–0.2)
BASOPHILS NFR BLD AUTO: 1.2 % (ref 0–1.5)
BH CV ECHO MEAS - ACS: 1.9 CM
BH CV ECHO MEAS - AO MAX PG: 4.5 MMHG
BH CV ECHO MEAS - AO MEAN PG: 2 MMHG
BH CV ECHO MEAS - AO ROOT DIAM: 2.6 CM
BH CV ECHO MEAS - AO V2 MAX: 106 CM/SEC
BH CV ECHO MEAS - AO V2 VTI: 22.3 CM
BH CV ECHO MEAS - AVA(I,D): 2.8 CM2
BH CV ECHO MEAS - EDV(CUBED): 68.9 ML
BH CV ECHO MEAS - EDV(MOD-SP2): 82 ML
BH CV ECHO MEAS - EDV(MOD-SP4): 94 ML
BH CV ECHO MEAS - EF(MOD-BP): 63.5 %
BH CV ECHO MEAS - EF(MOD-SP2): 62.2 %
BH CV ECHO MEAS - EF(MOD-SP4): 64.9 %
BH CV ECHO MEAS - ESV(CUBED): 33.8 ML
BH CV ECHO MEAS - ESV(MOD-SP2): 31 ML
BH CV ECHO MEAS - ESV(MOD-SP4): 33 ML
BH CV ECHO MEAS - FS: 21.2 %
BH CV ECHO MEAS - IVS/LVPW: 0.89 CM
BH CV ECHO MEAS - IVSD: 0.8 CM
BH CV ECHO MEAS - LAT PEAK E' VEL: 7.7 CM/SEC
BH CV ECHO MEAS - LV DIASTOLIC VOL/BSA (35-75): 57.8 CM2
BH CV ECHO MEAS - LV MASS(C)D: 105.6 GRAMS
BH CV ECHO MEAS - LV MAX PG: 3.8 MMHG
BH CV ECHO MEAS - LV MEAN PG: 2 MMHG
BH CV ECHO MEAS - LV SYSTOLIC VOL/BSA (12-30): 20.3 CM2
BH CV ECHO MEAS - LV V1 MAX: 97.3 CM/SEC
BH CV ECHO MEAS - LV V1 VTI: 18 CM
BH CV ECHO MEAS - LVIDD: 4.1 CM
BH CV ECHO MEAS - LVIDS: 3.2 CM
BH CV ECHO MEAS - LVOT AREA: 3.5 CM2
BH CV ECHO MEAS - LVOT DIAM: 2.11 CM
BH CV ECHO MEAS - LVPWD: 0.9 CM
BH CV ECHO MEAS - MED PEAK E' VEL: 7 CM/SEC
BH CV ECHO MEAS - MV A DUR: 0.15 SEC
BH CV ECHO MEAS - MV A MAX VEL: 73.3 CM/SEC
BH CV ECHO MEAS - MV DEC SLOPE: 190.6 CM/SEC2
BH CV ECHO MEAS - MV DEC TIME: 0.23 SEC
BH CV ECHO MEAS - MV E MAX VEL: 52.7 CM/SEC
BH CV ECHO MEAS - MV E/A: 0.72
BH CV ECHO MEAS - MV MAX PG: 3.2 MMHG
BH CV ECHO MEAS - MV MEAN PG: 0.98 MMHG
BH CV ECHO MEAS - MV P1/2T: 86.8 MSEC
BH CV ECHO MEAS - MV V2 VTI: 21.1 CM
BH CV ECHO MEAS - MVA(P1/2T): 2.5 CM2
BH CV ECHO MEAS - MVA(VTI): 3 CM2
BH CV ECHO MEAS - PA ACC TIME: 0.18 SEC
BH CV ECHO MEAS - PA V2 MAX: 82.8 CM/SEC
BH CV ECHO MEAS - PI END-D VEL: 99.1 CM/SEC
BH CV ECHO MEAS - PULM A REVS DUR: 0.14 SEC
BH CV ECHO MEAS - PULM A REVS VEL: 33 CM/SEC
BH CV ECHO MEAS - PULM DIAS VEL: 37.7 CM/SEC
BH CV ECHO MEAS - PULM S/D: 1.02
BH CV ECHO MEAS - PULM SYS VEL: 38.6 CM/SEC
BH CV ECHO MEAS - QP/QS: 0.79
BH CV ECHO MEAS - RAP SYSTOLE: 22 MMHG
BH CV ECHO MEAS - RV MAX PG: 1.48 MMHG
BH CV ECHO MEAS - RV V1 MAX: 60.8 CM/SEC
BH CV ECHO MEAS - RV V1 VTI: 12.6 CM
BH CV ECHO MEAS - RVOT DIAM: 2.24 CM
BH CV ECHO MEAS - RVSP: 3 MMHG
BH CV ECHO MEAS - SI(MOD-SP2): 31.3 ML/M2
BH CV ECHO MEAS - SI(MOD-SP4): 37.5 ML/M2
BH CV ECHO MEAS - SUP REN AO DIAM: 2.5 CM
BH CV ECHO MEAS - SV(LVOT): 63.2 ML
BH CV ECHO MEAS - SV(MOD-SP2): 51 ML
BH CV ECHO MEAS - SV(MOD-SP4): 61 ML
BH CV ECHO MEAS - SV(RVOT): 49.7 ML
BH CV ECHO MEAS - TR MAX PG: 18.1 MMHG
BH CV ECHO MEAS - TR MAX VEL: 212.7 CM/SEC
BH CV ECHO MEASUREMENTS AVERAGE E/E' RATIO: 7.17
BH CV STRESS BP STAGE 1: NORMAL
BH CV STRESS BP STAGE 2: NORMAL
BH CV STRESS BP STAGE 3: NORMAL
BH CV STRESS DURATION MIN STAGE 1: 3
BH CV STRESS DURATION MIN STAGE 2: 3
BH CV STRESS DURATION MIN STAGE 3: 3
BH CV STRESS DURATION SEC STAGE 1: 0
BH CV STRESS DURATION SEC STAGE 2: 0
BH CV STRESS DURATION SEC STAGE 3: 0
BH CV STRESS GRADE STAGE 1: 10
BH CV STRESS GRADE STAGE 2: 12
BH CV STRESS GRADE STAGE 3: 14
BH CV STRESS HR STAGE 1: 113
BH CV STRESS HR STAGE 2: 135
BH CV STRESS HR STAGE 3: 150
BH CV STRESS METS STAGE 1: 5
BH CV STRESS METS STAGE 2: 7.5
BH CV STRESS METS STAGE 3: 10
BH CV STRESS PROTOCOL 1: NORMAL
BH CV STRESS RECOVERY HR: 79 BPM
BH CV STRESS SPEED STAGE 1: 1.7
BH CV STRESS SPEED STAGE 2: 2.5
BH CV STRESS SPEED STAGE 3: 3.4
BH CV STRESS STAGE 1: 1
BH CV STRESS STAGE 2: 2
BH CV STRESS STAGE 3: 3
BH CV XLRA - RV BASE: 3 CM
BH CV XLRA - RV LENGTH: 6.8 CM
BH CV XLRA - RV MID: 2.28 CM
BH CV XLRA - TDI S': 15.7 CM/SEC
BILIRUB SERPL-MCNC: <0.2 MG/DL (ref 0–1.2)
BUN SERPL-MCNC: 11 MG/DL (ref 8–23)
BUN/CREAT SERPL: 15.9 (ref 7–25)
CALCIUM SPEC-SCNC: 10.2 MG/DL (ref 8.6–10.5)
CHLORIDE SERPL-SCNC: 103 MMOL/L (ref 98–107)
CO2 SERPL-SCNC: 27.8 MMOL/L (ref 22–29)
CREAT SERPL-MCNC: 0.69 MG/DL (ref 0.57–1)
D DIMER PPP FEU-MCNC: 0.64 MCGFEU/ML (ref 0–0.65)
DEPRECATED RDW RBC AUTO: 44.1 FL (ref 37–54)
EGFRCR SERPLBLD CKD-EPI 2021: 96.5 ML/MIN/1.73
EOSINOPHIL # BLD AUTO: 0.05 10*3/MM3 (ref 0–0.4)
EOSINOPHIL NFR BLD AUTO: 1 % (ref 0.3–6.2)
ERYTHROCYTE [DISTWIDTH] IN BLOOD BY AUTOMATED COUNT: 12.5 % (ref 12.3–15.4)
GLOBULIN UR ELPH-MCNC: 2.5 GM/DL
GLUCOSE SERPL-MCNC: 112 MG/DL (ref 65–99)
HCT VFR BLD AUTO: 41.2 % (ref 34–46.6)
HGB BLD-MCNC: 14 G/DL (ref 12–15.9)
IMM GRANULOCYTES # BLD AUTO: 0.02 10*3/MM3 (ref 0–0.05)
IMM GRANULOCYTES NFR BLD AUTO: 0.4 % (ref 0–0.5)
LEFT ATRIUM VOLUME INDEX: 16.6 ML/M2
LYMPHOCYTES # BLD AUTO: 1.09 10*3/MM3 (ref 0.7–3.1)
LYMPHOCYTES NFR BLD AUTO: 22 % (ref 19.6–45.3)
MAXIMAL PREDICTED HEART RATE: 155 BPM
MCH RBC QN AUTO: 32.9 PG (ref 26.6–33)
MCHC RBC AUTO-ENTMCNC: 34 G/DL (ref 31.5–35.7)
MCV RBC AUTO: 96.9 FL (ref 79–97)
MONOCYTES # BLD AUTO: 0.46 10*3/MM3 (ref 0.1–0.9)
MONOCYTES NFR BLD AUTO: 9.3 % (ref 5–12)
NEUTROPHILS NFR BLD AUTO: 3.28 10*3/MM3 (ref 1.7–7)
NEUTROPHILS NFR BLD AUTO: 66.1 % (ref 42.7–76)
NRBC BLD AUTO-RTO: 0 /100 WBC (ref 0–0.2)
NT-PROBNP SERPL-MCNC: 60.9 PG/ML (ref 0–900)
PERCENT MAX PREDICTED HR: 96.77 %
PLATELET # BLD AUTO: 253 10*3/MM3 (ref 140–450)
PMV BLD AUTO: 10.3 FL (ref 6–12)
POTASSIUM SERPL-SCNC: 4 MMOL/L (ref 3.5–5.2)
PROT SERPL-MCNC: 7.2 G/DL (ref 6–8.5)
RBC # BLD AUTO: 4.25 10*6/MM3 (ref 3.77–5.28)
SINUS: 3.2 CM
SODIUM SERPL-SCNC: 141 MMOL/L (ref 136–145)
STJ: 2.48 CM
STRESS BASELINE BP: NORMAL MMHG
STRESS BASELINE HR: 81 BPM
STRESS PERCENT HR: 114 %
STRESS POST ESTIMATED WORKLOAD: 10 METS
STRESS POST EXERCISE DUR MIN: 9 MIN
STRESS POST EXERCISE DUR SEC: 0 SEC
STRESS POST PEAK BP: NORMAL MMHG
STRESS POST PEAK HR: 150 BPM
STRESS TARGET HR: 132 BPM
TROPONIN T SERPL HS-MCNC: 14 NG/L
WBC NRBC COR # BLD: 4.96 10*3/MM3 (ref 3.4–10.8)

## 2023-11-01 PROCEDURE — 80053 COMPREHEN METABOLIC PANEL: CPT

## 2023-11-01 PROCEDURE — 94760 N-INVAS EAR/PLS OXIMETRY 1: CPT

## 2023-11-01 PROCEDURE — 36415 COLL VENOUS BLD VENIPUNCTURE: CPT

## 2023-11-01 PROCEDURE — 93350 STRESS TTE ONLY: CPT

## 2023-11-01 PROCEDURE — 85025 COMPLETE CBC W/AUTO DIFF WBC: CPT

## 2023-11-01 PROCEDURE — 93320 DOPPLER ECHO COMPLETE: CPT

## 2023-11-01 PROCEDURE — 84484 ASSAY OF TROPONIN QUANT: CPT | Performed by: INTERNAL MEDICINE

## 2023-11-01 PROCEDURE — 93005 ELECTROCARDIOGRAM TRACING: CPT | Performed by: INTERNAL MEDICINE

## 2023-11-01 PROCEDURE — 93325 DOPPLER ECHO COLOR FLOW MAPG: CPT

## 2023-11-01 PROCEDURE — 83880 ASSAY OF NATRIURETIC PEPTIDE: CPT | Performed by: INTERNAL MEDICINE

## 2023-11-01 PROCEDURE — 85379 FIBRIN DEGRADATION QUANT: CPT | Performed by: INTERNAL MEDICINE

## 2023-11-01 PROCEDURE — 25510000001 PERFLUTREN (DEFINITY) 8.476 MG IN SODIUM CHLORIDE (PF) 0.9 % 10 ML INJECTION: Performed by: INTERNAL MEDICINE

## 2023-11-01 PROCEDURE — 93017 CV STRESS TEST TRACING ONLY: CPT

## 2023-11-01 RX ORDER — NITROGLYCERIN 0.4 MG/1
0.4 TABLET SUBLINGUAL
Status: SHIPPED | OUTPATIENT
Start: 2023-11-01

## 2023-11-01 RX ORDER — SODIUM CHLORIDE 0.9 % (FLUSH) 0.9 %
10 SYRINGE (ML) INJECTION AS NEEDED
Status: SHIPPED | OUTPATIENT
Start: 2023-11-01

## 2023-11-01 RX ADMIN — PERFLUTREN 5 ML: 6.52 INJECTION, SUSPENSION INTRAVENOUS at 13:11

## 2023-11-01 NOTE — PROGRESS NOTES
Subjective:     Encounter Date:11/01/2023      Patient ID: Zoila Delgado is a 63 y.o. female.    Chief Complaint: Chest pain  Chest Pain   Associated symptoms include palpitations and shortness of breath.   Palpitations   Associated symptoms include chest pain and shortness of breath.   Shortness of Breath  Associated symptoms include chest pain.       63-year-old female who presents to our cardiac evaluation center complaining of chest discomforts.  Patient has had several issues for many years including headaches lightheadedness and palpitations.  She said however the symptoms have progressively worsened in the past week.  In addition she said for the past 2 weeks she is getting chest discomfort with extreme exercise.  She is worked out for many years she says she can walk 5 miles with no issues but if she does anything exertional she will get the discomfort in her chest.  She also has associated shortness of breath but no diaphoresis nausea or vomiting.  This has progressively gotten worse over the past 2 weeks so she was referred for further evaluation.    Review of Systems   Cardiovascular:  Positive for chest pain and palpitations.   Respiratory:  Positive for shortness of breath.          ECG 12 Lead    Date/Time: 11/1/2023 12:16 PM  Performed by: Rodriguez Burger MD    Authorized by: Rodriguez Burger MD  Previous ECG: no previous ECG available  Rhythm: sinus rhythm    Clinical impression: normal ECG             Objective:     Vitals reviewed.   Constitutional:       Appearance: Healthy appearance.   Pulmonary:      Effort: Pulmonary effort is normal.   Cardiovascular:      Normal rate. Regular rhythm. Normal S1. Normal S2.       Murmurs: There is no murmur.      No gallop.  No click. No rub.   Pulses:     Intact distal pulses.   Edema:     Peripheral edema absent.   Neurological:      Mental Status: Alert.         Lab Review:       Assessment:          Diagnosis Plan   1. Precordial pain  Cardiac  Monitoring    Vital Signs - Once    Vital Signs - As Needed    Pulse Oximetry    Oxygen Therapy- Nasal Cannula; Titrate 1-6 LPM Per SpO2; 92% or Greater    Insert Peripheral IV    sodium chloride 0.9 % flush 10 mL    nitroglycerin (NITROSTAT) SL tablet 0.4 mg    NPO Diet NPO Type: Strict NPO    Bathroom Privileges With Assistance    CBC & Differential    Comprehensive Metabolic Panel    Troponin    D-Dimer    proBNP    ECG 12 Lead    Cardiac Monitoring    Vital Signs - Once    Insert Peripheral IV    NPO Diet NPO Type: Strict NPO    Bathroom Privileges With Assistance    Troponin    Troponin    D-Dimer    D-Dimer    proBNP    proBNP    High Sensitivity Troponin T 2Hr    High Sensitivity Troponin T 2Hr    Adult Stress Echo W/ Cont or Stress Agent if Necessary Per Protocol      2. Shortness of breath  Cardiac Monitoring    Vital Signs - Once    Vital Signs - As Needed    Pulse Oximetry    Oxygen Therapy- Nasal Cannula; Titrate 1-6 LPM Per SpO2; 92% or Greater    Insert Peripheral IV    sodium chloride 0.9 % flush 10 mL    nitroglycerin (NITROSTAT) SL tablet 0.4 mg    NPO Diet NPO Type: Strict NPO    Bathroom Privileges With Assistance    CBC & Differential    Comprehensive Metabolic Panel    Troponin    D-Dimer    proBNP    ECG 12 Lead    Cardiac Monitoring    Vital Signs - Once    Insert Peripheral IV    NPO Diet NPO Type: Strict NPO    Bathroom Privileges With Assistance    Troponin    Troponin    D-Dimer    D-Dimer    proBNP    proBNP    High Sensitivity Troponin T 2Hr    High Sensitivity Troponin T 2Hr      3. Palpitations  Cardiac Monitoring    Vital Signs - Once    Vital Signs - As Needed    Pulse Oximetry    Oxygen Therapy- Nasal Cannula; Titrate 1-6 LPM Per SpO2; 92% or Greater    Insert Peripheral IV    sodium chloride 0.9 % flush 10 mL    nitroglycerin (NITROSTAT) SL tablet 0.4 mg    NPO Diet NPO Type: Strict NPO    Bathroom Privileges With Assistance    CBC & Differential    Comprehensive Metabolic Panel     Troponin    D-Dimer    proBNP    ECG 12 Lead    Cardiac Monitoring    Vital Signs - Once    Insert Peripheral IV    NPO Diet NPO Type: Strict NPO    Bathroom Privileges With Assistance    Troponin    Troponin    D-Dimer    D-Dimer    proBNP    proBNP    High Sensitivity Troponin T 2Hr    High Sensitivity Troponin T 2Hr    Adult Stress Echo W/ Cont or Stress Agent if Necessary Per Protocol             Plan:       1.  Patient with recurrent chest discomfort for the past 2 weeks.  She exercises on a routine basis but she says this occurs when she pushes herself.  With extreme exercise she has the chest discomforts.  ECG is normal but her troponin is 14.  Because of that I will set her up for stress echocardiogram.  I would want to make sure that she does not having resting wall motion abnormalities prior to doing a stress test.  Depending on the results of stress echo further recommendations will be made.

## 2023-11-01 NOTE — ADDENDUM NOTE
Encounter addended by: Delmis Terrazas RN on: 11/1/2023 2:22 PM   Actions taken: LDA properties accepted, Flowsheet accepted, Check Out activity completed

## 2023-11-29 DIAGNOSIS — M47.816 FACET ARTHROPATHY, LUMBAR: ICD-10-CM

## 2023-11-29 DIAGNOSIS — M54.42 CHRONIC BILATERAL LOW BACK PAIN WITH BILATERAL SCIATICA: ICD-10-CM

## 2023-11-29 DIAGNOSIS — M54.41 CHRONIC BILATERAL LOW BACK PAIN WITH BILATERAL SCIATICA: ICD-10-CM

## 2023-11-29 DIAGNOSIS — M43.16 SPONDYLOLISTHESIS, LUMBAR REGION: ICD-10-CM

## 2023-11-29 DIAGNOSIS — G89.29 CHRONIC BILATERAL LOW BACK PAIN WITH BILATERAL SCIATICA: ICD-10-CM

## 2023-11-29 DIAGNOSIS — M51.36 DDD (DEGENERATIVE DISC DISEASE), LUMBAR: ICD-10-CM

## 2023-11-29 RX ORDER — METHOCARBAMOL 500 MG/1
500 TABLET, FILM COATED ORAL NIGHTLY PRN
Qty: 30 TABLET | Refills: 0 | OUTPATIENT
Start: 2023-11-29

## 2023-12-01 ENCOUNTER — OFFICE VISIT (OUTPATIENT)
Dept: ORTHOPEDIC SURGERY | Facility: CLINIC | Age: 63
End: 2023-12-01
Payer: MEDICARE

## 2023-12-01 VITALS — WEIGHT: 122 LBS | BODY MASS INDEX: 19.61 KG/M2 | HEIGHT: 66 IN

## 2023-12-01 DIAGNOSIS — M70.62 TROCHANTERIC BURSITIS OF LEFT HIP: ICD-10-CM

## 2023-12-01 DIAGNOSIS — M17.11 PRIMARY OSTEOARTHRITIS OF RIGHT KNEE: Primary | ICD-10-CM

## 2023-12-01 RX ORDER — PREDNISONE 10 MG/1
TABLET ORAL
COMMUNITY
Start: 2023-11-28

## 2023-12-01 RX ORDER — TRIAMCINOLONE ACETONIDE 40 MG/ML
80 INJECTION, SUSPENSION INTRA-ARTICULAR; INTRAMUSCULAR
Status: COMPLETED | OUTPATIENT
Start: 2023-12-01 | End: 2023-12-01

## 2023-12-01 RX ORDER — LIDOCAINE HYDROCHLORIDE 10 MG/ML
8 INJECTION, SOLUTION EPIDURAL; INFILTRATION; INTRACAUDAL; PERINEURAL
Status: COMPLETED | OUTPATIENT
Start: 2023-12-01 | End: 2023-12-01

## 2023-12-01 RX ORDER — LIDOCAINE HYDROCHLORIDE 10 MG/ML
4 INJECTION, SOLUTION EPIDURAL; INFILTRATION; INTRACAUDAL; PERINEURAL
Status: COMPLETED | OUTPATIENT
Start: 2023-12-01 | End: 2023-12-01

## 2023-12-01 RX ADMIN — LIDOCAINE HYDROCHLORIDE 8 ML: 10 INJECTION, SOLUTION EPIDURAL; INFILTRATION; INTRACAUDAL; PERINEURAL at 08:17

## 2023-12-01 RX ADMIN — TRIAMCINOLONE ACETONIDE 80 MG: 40 INJECTION, SUSPENSION INTRA-ARTICULAR; INTRAMUSCULAR at 08:18

## 2023-12-01 RX ADMIN — LIDOCAINE HYDROCHLORIDE 4 ML: 10 INJECTION, SOLUTION EPIDURAL; INFILTRATION; INTRACAUDAL; PERINEURAL at 08:18

## 2023-12-01 RX ADMIN — TRIAMCINOLONE ACETONIDE 80 MG: 40 INJECTION, SUSPENSION INTRA-ARTICULAR; INTRAMUSCULAR at 08:17

## 2023-12-01 NOTE — PROGRESS NOTES
Subjective:     Patient ID: Zoila Delgado is a 63 y.o. female.    Chief Complaint:  Follow-up DJD right knee  Corticosteroid injection 8/3/2023  Greater trochanteric bursitis left hip  Corticosteroid injection 8/2/2023 positive active patellar compression test  Positive crepitus throughout arc of motion  History of Present Illness  Zoila Delgado returns to clinic for follow-up of her right knee and left hip maximal tenderness present along the lateral aspect of the hip increased pain noted with activity, and sleeping at night is increased her pain she did start oral steroid taper earlier this week which has helped decrease some of her pain she is experiencing pain at the right knee with activities involving deep flexion along the anterior medial and lateral compartments.  She is experiencing swelling of the knee.  She has noticed decreased strength of the right lower extremity compared to that of the left.  Denies any other concerns present.       Social History     Occupational History    Occupation: RETIRED    Tobacco Use    Smoking status: Never    Smokeless tobacco: Never   Vaping Use    Vaping Use: Never used   Substance and Sexual Activity    Alcohol use: No    Drug use: No    Sexual activity: Yes     Partners: Female     Birth control/protection: None      Past Medical History:   Diagnosis Date    Ankle sprain     Arthritis     Arthritis of back Jan 22    Dr. Owen    Monsivais esophagus     Cervical disc disorder     Diverticular disease     Diverticulitis     Dyspepsia     Gastritis     GERD (gastroesophageal reflux disease)     HH (hiatus hernia)     Hip arthrosis July 27 2022    Dr Yee did Mri    Hyperlipidemia     Knee swelling Several years ago    Dr. Yee    Low back pain     Lumbar disc disease with radiculopathy 01/31/2023    Perirectal abscess     Renal cyst     left    Rotator cuff syndrome     Shoulder injury     Trochanteric bursitis of left hip 01/31/2023     Past Surgical History:  "  Procedure Laterality Date    COLONOSCOPY  11/30/2015    tics,nibh    ENDOMETRIAL ABLATION      ENDOSCOPY  11/30/2015    HH, Z-line irregular, 42 cm. fromincisors, chronic inflammation    ENDOSCOPY N/A 05/07/2019    Monsivais's, HH    TONSILECTOMY, ADENOIDECTOMY, BILATERAL MYRINGOTOMY AND TUBES      TONSILLECTOMY  1968       Family History   Problem Relation Age of Onset    Hypertension Mother     Cancer Mother     Hypertension Father     Breast cancer Sister     Cancer Sister     Stroke Maternal Grandmother                Objective:  Physical Exam    General: No acute distress.  Eyes: conjunctiva clear; pupils equally round and reactive  ENT: external ears and nose atraumatic; oropharynx clear  CV: no peripheral edema  Resp: normal respiratory effort  Skin: no rashes or wounds; normal turgor  Psych: mood and affect appropriate; recent and remote memory intact    Vitals:    12/01/23 0810   Weight: 55.3 kg (122 lb)   Height: 167.6 cm (66\")         12/01/23  0810   Weight: 55.3 kg (122 lb)     Body mass index is 19.69 kg/m².      Right Knee Exam     Tenderness   The patient is experiencing tenderness in the patella, lateral joint line and medial joint line.    Range of Motion   Extension:  0   Right knee flexion: 125.     Tests   Lizz:  Medial - negative Lateral - negative  Varus: negative Valgus: negative  Lachman:  Anterior - 1+      Patellar apprehension: positive    Other   Erythema: absent  Sensation: normal  Pulse: present  Swelling: moderate  Effusion: effusion present    Comments:  Positive active patellar compression test  Positive crepitus throughout arc of motion      Left Hip Exam     Tenderness   The patient is experiencing tenderness in the greater trochanter.    Range of Motion   Abduction:  45   Adduction:  25   Extension:  0   Flexion:  120   External rotation:  60   Internal rotation: 25     Muscle Strength   Abduction: 4/5   Adduction: 4/5   Flexion: 4/5     Other   Erythema: absent  Sensation: " normal  Pulse: present    Comments:  Negative logroll exam  negative stinchfield exam               Assessment:        1. Primary osteoarthritis of right knee    2. Trochanteric bursitis of left hip           Plan:  1.  Discussed plan of care with patient we did discuss proceeding with aspiration and corticosteroid injection right knee wishes to proceed with corticosteroid injection greater trochanter.  We will submit for viscosupplementation injections again right knee please see her back in clinic as needed.  All questions answered.  Large Joint Arthrocentesis: R knee  Date/Time: 12/1/2023 8:17 AM  Consent given by: patient  Site marked: site marked  Timeout: Immediately prior to procedure a time out was called to verify the correct patient, procedure, equipment, support staff and site/side marked as required   Supporting Documentation  Indications: pain   Procedure Details  Location: knee - R knee  Preparation: Patient was prepped and draped in the usual sterile fashion  Needle size: 18 G  Approach: superior  Medications administered: 8 mL lidocaine PF 1% 1 %; 80 mg triamcinolone acetonide 40 MG/ML  Aspirate amount: 9 mL  Aspirate: serous  Patient tolerance: patient tolerated the procedure well with no immediate complications      Large Joint Arthrocentesis: L greater trochanteric bursa  Date/Time: 12/1/2023 8:18 AM  Consent given by: patient  Site marked: site marked  Timeout: Immediately prior to procedure a time out was called to verify the correct patient, procedure, equipment, support staff and site/side marked as required   Supporting Documentation  Indications: pain   Procedure Details  Location: hip - L greater trochanteric bursa  Preparation: Patient was prepped and draped in the usual sterile fashion  Needle size: 22 G  Approach: lateral  Medications administered: 4 mL lidocaine PF 1% 1 %; 80 mg triamcinolone acetonide 40 MG/ML  Patient tolerance: patient tolerated the procedure well with no immediate  complications          Orders:  Orders Placed This Encounter   Procedures    Large Joint Arthrocentesis: R knee    Large Joint Arthrocentesis: L greater trochanteric bursa    Visco Treatment     No orders of the defined types were placed in this encounter.        Kisha dictation utilized

## 2024-01-11 ENCOUNTER — TELEPHONE (OUTPATIENT)
Dept: GASTROENTEROLOGY | Facility: CLINIC | Age: 64
End: 2024-01-11
Payer: MEDICARE

## 2024-01-11 ENCOUNTER — OFFICE VISIT (OUTPATIENT)
Dept: GASTROENTEROLOGY | Facility: CLINIC | Age: 64
End: 2024-01-11
Payer: MEDICARE

## 2024-01-11 VITALS
HEIGHT: 66 IN | TEMPERATURE: 98.9 F | WEIGHT: 122 LBS | DIASTOLIC BLOOD PRESSURE: 87 MMHG | HEART RATE: 84 BPM | BODY MASS INDEX: 19.61 KG/M2 | SYSTOLIC BLOOD PRESSURE: 137 MMHG | OXYGEN SATURATION: 99 %

## 2024-01-11 DIAGNOSIS — R10.9 ABDOMINAL DISCOMFORT: Primary | ICD-10-CM

## 2024-01-11 DIAGNOSIS — K59.04 CHRONIC IDIOPATHIC CONSTIPATION: ICD-10-CM

## 2024-01-11 DIAGNOSIS — R11.0 NAUSEA: ICD-10-CM

## 2024-01-11 DIAGNOSIS — R14.0 BLOATING: ICD-10-CM

## 2024-01-11 RX ORDER — HYALURONATE SODIUM, STABILIZED 88 MG/4 ML
SYRINGE (ML) INTRAARTICULAR
COMMUNITY
Start: 2023-12-11

## 2024-01-11 RX ORDER — PANTOPRAZOLE SODIUM 40 MG/1
40 TABLET, DELAYED RELEASE ORAL 2 TIMES DAILY
Qty: 90 TABLET | Refills: 3 | Status: SHIPPED | OUTPATIENT
Start: 2024-01-11

## 2024-01-11 RX ORDER — AZITHROMYCIN 250 MG/1
TABLET, FILM COATED ORAL
COMMUNITY
Start: 2024-01-08

## 2024-01-11 NOTE — TELEPHONE ENCOUNTER
----- Message from Evelin Charlton PA-C sent at 1/11/2024  9:08 AM EST -----  Could you please get in touch with patient's cardiologist for clearance for EGD/Colonoscopy? Patient said she saw them for palpitations in November and was undergoing workup.     Thank you,   Evelin Charlton PA-C

## 2024-01-11 NOTE — Clinical Note
Could you please get in touch with patient's cardiologist for clearance for EGD/Colonoscopy? Patient said she saw them for palpitations in November and was undergoing workup.   Thank you,  Evelin Charlton PA-C

## 2024-01-11 NOTE — Clinical Note
63yoF w/ hx of Monsivais's who presents as a new >3 for abd discomfort, nausea, worsening constipation/bloating.  She says she is always struggled with constipation but feels like it has been worsening over the last 2 months.  She takes MiraLAX intermittently which helps, but she is still struggling to empty her bowels.  She also reports waking up in the middle of the night with abdominal discomfort and nausea.  She takes OTC omeprazole 20 mg.  I switched her to pantoprazole 40 mg just to see if that helps at all.  Discussed that a lot of her symptoms could be d/t constipation and recommended MiraLAX 1-2 times daily to regulate BMS.  She is due for EGD but reported that she has been having intermittent palpitations worsening over the last month.  Followed with cards 11/2023 who did a workup which was for the most part unremarkable. I was going to get their ok to go forward w/ procedures. Thought colon at the same time might be beneficial with worsening symptoms and her last one was 9 yrs ago.

## 2024-01-11 NOTE — TELEPHONE ENCOUNTER
I saw your patient in [facility] they sent me this question.  I did know if you wanted to address it the patient was supposed to follow-up with you.

## 2024-01-11 NOTE — TELEPHONE ENCOUNTER
I do not think I have ever seen this patient.  Could you please confirm this.  It looks like she had an appointment but never showed up.

## 2024-01-11 NOTE — PROGRESS NOTES
"Chief Complaint  Abdominal Pain    Subjective          History of Present Illness    Zoila Delgado is a  63 y.o. female presents for new over 3 years visit regarding abdominal pain.  She is a patient of Dr. Archana Arias and is new to me.  She was last seen in the office by Dr. Arias on 11/13/2019 for abdominal pain, bloating, and heartburn.    Reports stomach pain after eating. Has tried antireflux measures, but still wakes up at night with an \"aching stomach\" and nausea. Went to [facility]"" 3-4 months ago and really struggled with indigestion there every day immediately after eating breakfast requiring her to take Pepcid daily for breakthrough symptoms. No longer needing Pepcid nightly now, but still having discomfort/nausea.  She currently takes over-the-counter omeprazole 20 mg once daily for reflux and she did double up on this around [place] which helped a little and now she is back down to once daily.  In addition to worsening abdominal discomfort she reports worsening constipation.  She says she takes MiraLAX for this occasionally which helps but for the last 2 months she is felt increasingly bloated and constipated and that she struggles more more to empty her bowels well.  She says she has a bowel movement every 1 to 2 days but says she has had to strain more and more.  She denies any vomiting, black stools, bloody stools, weight loss, difficulty swallowing.  She says she stays hydrated, and walks 5 miles daily.  She reports she tries her best to have a balanced diet -she eats lots of fiber, yogurt, protein shakes with fruits and vegetables.  She also takes Benefiber daily.  She reports her last colonoscopy was in 2015 and says it was unremarkable.  Unfortunately I cannot see these records.    Of note she did see cardiology back in November 2023 for recurrent chest discomfort worsened with exercise.  At that time she had normal ECG but troponin of 14. Underwent echo which showed mitral valve regurgitation " "and was otherwise unremarkable.    Most recent EGD was in May 2019 for regular surveillance endoscopy due to history of Monsivais's esophagus.  At that time she had 1 cm salmon-colored mucosa as well as a small hiatal hernia.  Biopsies did not show intestinal metaplasia.  Mild chronic inflammation was seen.  She is on daily omeprazole.  Intestinal metaplasia was seen on her EGD in 2015.    Objective   Vital Signs:   /87   Pulse 84   Temp 98.9 °F (37.2 °C)   Ht 167.6 cm (66\")   Wt 55.3 kg (122 lb)   SpO2 99%   BMI 19.69 kg/m²       Physical Exam  Constitutional:       General: She is not in acute distress.     Appearance: Normal appearance.   Eyes:      General: No scleral icterus.  Cardiovascular:      Rate and Rhythm: Normal rate.   Pulmonary:      Effort: Pulmonary effort is normal.   Abdominal:      General: Abdomen is flat. Bowel sounds are normal. There is no distension.      Tenderness: There is no abdominal tenderness. There is no guarding.   Skin:     Coloration: Skin is not jaundiced.   Neurological:      General: No focal deficit present.      Mental Status: She is alert and oriented to person, place, and time.   Psychiatric:         Mood and Affect: Mood normal.         Behavior: Behavior normal.          Result Review :   The following data was reviewed by: Evelin Charlton PA-C on 01/11/2024:  CMP          11/1/2023    10:18   CMP   Glucose 112    BUN 11    Creatinine 0.69    EGFR 96.5    Sodium 141    Potassium 4.0    Chloride 103    Calcium 10.2    Total Protein 7.2    Albumin 4.7    Globulin 2.5    Total Bilirubin <0.2    Alkaline Phosphatase 67    AST (SGOT) 53    ALT (SGPT) 57    Albumin/Globulin Ratio 1.9    BUN/Creatinine Ratio 15.9    Anion Gap 10.2      CBC          11/1/2023    10:18   CBC   WBC 4.96    RBC 4.25    Hemoglobin 14.0    Hematocrit 41.2    MCV 96.9    MCH 32.9    MCHC 34.0    RDW 12.5    Platelets 253       Reviewed EGD from 05/2019      Assessment:   Diagnoses and all " orders for this visit:    1. Abdominal discomfort (Primary)  -     pantoprazole (PROTONIX) 40 MG EC tablet; Take 1 tablet by mouth 2 (Two) Times a Day.  Dispense: 90 tablet; Refill: 3    2. Nausea  -     pantoprazole (PROTONIX) 40 MG EC tablet; Take 1 tablet by mouth 2 (Two) Times a Day.  Dispense: 90 tablet; Refill: 3    3. Bloating    4. Chronic idiopathic constipation          Plan:   -With high pantoprazole 40 mg twice daily for now to see if this helps with her abdominal discomfort.  -I do think that a lot of her symptoms could be from constipation which has been worsening.  Recommended continuing fiber supplement and starting MiraLAX once daily or twice daily to help regulate bowel movements.  If she can have a bowel movement more regularly it may help with her nausea, bloating, generalized abdominal discomfort.  -Patient due for repeat EGD 05/20/2024 for Monsivais's surveillance -she does report that she has been having palpitations for which she followed with cardiology but says she thinks they are getting worse.  Will discuss with cardiology for clearance prior to getting her scheduled for this.  -Given worsening lower GI symptoms and the fact that it has been 9 years since her last colonoscopy I think she would benefit from 1 of these the same time as her EGD.  -Risks (including, but not limited to perforation, bleeding, sedation-related complications, and death), benefits, and alternatives to the procedure were discussed with the patient and all questions were answered.          Follow Up   No follow-ups on file.    Dragon dictation used throughout this note.         Evelin Charlton PA-C  [facility]  [address] [place] [address]  Office: [phone]

## 2024-01-15 DIAGNOSIS — R14.0 BLOATING: ICD-10-CM

## 2024-01-15 DIAGNOSIS — R10.9 ABDOMINAL DISCOMFORT: Primary | ICD-10-CM

## 2024-01-15 DIAGNOSIS — K59.04 CHRONIC IDIOPATHIC CONSTIPATION: ICD-10-CM

## 2024-01-15 DIAGNOSIS — R11.0 NAUSEA: ICD-10-CM

## 2024-02-01 ENCOUNTER — OFFICE VISIT (OUTPATIENT)
Dept: CARDIOLOGY | Facility: CLINIC | Age: 64
End: 2024-02-01
Payer: MEDICARE

## 2024-02-01 VITALS
OXYGEN SATURATION: 98 % | SYSTOLIC BLOOD PRESSURE: 110 MMHG | BODY MASS INDEX: 20.41 KG/M2 | HEART RATE: 74 BPM | DIASTOLIC BLOOD PRESSURE: 80 MMHG | HEIGHT: 66 IN | WEIGHT: 127 LBS

## 2024-02-01 DIAGNOSIS — R42 LIGHTHEADEDNESS: ICD-10-CM

## 2024-02-01 DIAGNOSIS — R00.2 PALPITATIONS: Primary | ICD-10-CM

## 2024-02-01 DIAGNOSIS — R07.89 CHEST PRESSURE: ICD-10-CM

## 2024-02-01 DIAGNOSIS — I34.0 NONRHEUMATIC MITRAL VALVE REGURGITATION: ICD-10-CM

## 2024-02-01 PROCEDURE — 1160F RVW MEDS BY RX/DR IN RCRD: CPT | Performed by: NURSE PRACTITIONER

## 2024-02-01 PROCEDURE — 99214 OFFICE O/P EST MOD 30 MIN: CPT | Performed by: NURSE PRACTITIONER

## 2024-02-01 PROCEDURE — 93000 ELECTROCARDIOGRAM COMPLETE: CPT | Performed by: NURSE PRACTITIONER

## 2024-02-01 PROCEDURE — 1159F MED LIST DOCD IN RCRD: CPT | Performed by: NURSE PRACTITIONER

## 2024-02-01 NOTE — PROGRESS NOTES
"  Date of Office Visit: 2024  Encounter Provider: SYD Marie  Place of Service: [facility] CARDIOLOGY  Patient Name: Zoila Delgado  :1960  Primary Cardiologist: Dr. Marty Alvarez     Chief Complaint   Patient presents with    Chest Pain    Follow-up   :     HPI: Zoila Delgado is a 63 y.o. female who presents today for cardiac follow-up visit.  She is a new patient to me and I have reviewed her medical records.    In the , she was experiencing palpitations and was told that she had \"skipped heartbeats\".  She was treated with a beta-blocker which resolved.  She was then taken off the beta-blocker.    In May 2021, she was experiencing palpitations and saw Dr. Marty Alvarez.  Dr. Alvarez of these are more consistent with benign premature ventricular contractions and recommended magnesium oxide.  In 2021, treadmill stress test was normal.  Echocardiogram showed normal LVEF, trace mitral, pulmonic, and tricuspid regurgitation.    In 2022, vascular screenings showed normal carotids, no abdominal aortic aneurysm, and normal lower extremities.    In 2023, her PCP referred her to her cardiac evaluation center for evaluation of chest pain with exercise.  She also reported headaches, lightheadedness, and palpitations for many years.  Stress echocardiogram showed the following: Normal LVEF, mild mitral regurgitation, she reported chest pressure 2/10, occasional PACs/PVCs during stress, and no EKG/echo evidence of ischemia.    She presents today for follow-up visit.  She continues to work out often with a .  Sometimes when working out if she overexerts, she experiences some shortness of breath.  She denies shortness of breath with daily activities and denies any further chest pain/pressure.  She continues to experience intermittent palpitations that she describes as a fluttering sensation and can last minutes.  She has occasional lightheadedness and " headaches.  Her blood pressure and heart rate are both normal today.  She is scheduled to have a right knee replacement by Dr. Mariano Yee in the near future.      Past Medical History:   Diagnosis Date    Ankle sprain     Arthritis     Arthritis of back Jan 22    Dr. Owen    Monsivais esophagus     Cervical disc disorder     Diverticular disease     Diverticulitis     Dyspepsia     Gastritis     GERD (gastroesophageal reflux disease)     HH (hiatus hernia)     Hip arthrosis July 27 2022    Dr Yee did Mri    Hyperlipidemia     Knee swelling Several years ago    Dr. Yee    Low back pain     Lumbar disc disease with radiculopathy 01/31/2023    Perirectal abscess     Renal cyst     left    Rotator cuff syndrome     Shoulder injury     Trochanteric bursitis of left hip 01/31/2023       Past Surgical History:   Procedure Laterality Date    COLONOSCOPY  11/30/2015    tics,nibh    ENDOMETRIAL ABLATION      ENDOSCOPY  11/30/2015    HH, Z-line irregular, 42 cm. fromincisors, chronic inflammation    ENDOSCOPY N/A 05/07/2019    Monsivais's, HH    TONSILECTOMY, ADENOIDECTOMY, BILATERAL MYRINGOTOMY AND TUBES      TONSILLECTOMY  1968       Social History     Socioeconomic History    Marital status: Single   Tobacco Use    Smoking status: Never    Smokeless tobacco: Never   Vaping Use    Vaping Use: Never used   Substance and Sexual Activity    Alcohol use: No    Drug use: No    Sexual activity: Yes     Partners: Female     Birth control/protection: None       Family History   Problem Relation Age of Onset    Hypertension Mother     Cancer Mother     Hypertension Father     Breast cancer Sister     Cancer Sister     Stroke Maternal Grandmother        The following portion of the patient's history were reviewed and updated as appropriate: past medical history, past surgical history, past social history, past family history, allergies, current medications, and problem list.    Review of Systems   Constitutional: Negative.  "  Cardiovascular:  Positive for palpitations.   Respiratory: Negative.     Hematologic/Lymphatic: Negative.    Neurological:  Positive for headaches and light-headedness.       No Known Allergies      Current Outpatient Medications:     B Complex Vitamins (vitamin b complex) capsule capsule, Take  by mouth Daily., Disp: , Rfl:     Calcium Carbonate (CALCIUM CHEW) 500 MG chewable tablet, Chew 1 tablet Every 6 (Six) Hours As Needed., Disp: , Rfl:     Magnesium 100 MG tablet, Take 400 mg by mouth Daily., Disp: , Rfl:     meloxicam (MOBIC) 15 MG tablet, TAKE 1 TABLET BY MOUTH DAILY, Disp: 30 tablet, Rfl: 2    Monovisc 88 MG/4ML solution prefilled syringe injection, , Disp: , Rfl:     Multiple Vitamins-Minerals (QC MULTI-MORENO PO), Take  by mouth., Disp: , Rfl:     pantoprazole (PROTONIX) 40 MG EC tablet, Take 1 tablet by mouth 2 (Two) Times a Day., Disp: 90 tablet, Rfl: 3    Potassium Gluconate 550 MG tablet, Daily., Disp: , Rfl:     rosuvastatin (CRESTOR) 20 MG tablet, , Disp: , Rfl:     vitamin C (ASCORBIC ACID) 500 MG tablet, Take 1 tablet by mouth 2 (Two) Times a Day., Disp: , Rfl:           Objective:     Vitals:    02/01/24 0953   BP: 110/80   BP Location: Right arm   Patient Position: Sitting   Cuff Size: Adult   Pulse: 74   SpO2: 98%   Weight: 57.6 kg (127 lb)   Height: 167.6 cm (65.98\")     Body mass index is 20.51 kg/m².    PHYSICAL EXAM:    Vitals Reviewed.   General Appearance: No acute distress, well developed and well nourished. Thin.   HENT: No hearing loss noted.    Respiratory: No signs of respiratory distress. Respiration rhythm and depth normal.  Clear to auscultation.   Cardiovascular:  Jugular Venous Pressure: Normal  Heart Rate and Rhythm: Normal, Heart Sounds: Normal S1 and S2. No S3 or S4 noted.  Murmurs: No murmurs noted. No rubs, thrills, or gallops.   Lower Extremities: No edema noted.  Musculoskeletal: Normal movement of extremities.  Skin: General appearance normal.    Psychiatric: Patient " alert and oriented to person, place, and time. Speech and behavior appropriate. Normal mood and affect.       ECG 12 Lead    Date/Time: 2/1/2024 9:58 AM  Performed by: Krystin Garner APRN    Authorized by: Krystin Garner APRN  Comparison: compared with previous ECG from 11/1/2023  Similar to previous ECG  Rhythm: sinus rhythm  Rate: normal  BPM: 74  Conduction: conduction normal  ST Segments: ST segments normal  T Waves: T waves normal  QRS axis: normal    Clinical impression: normal ECG            Assessment:       Diagnosis Plan   1. Palpitations  Mobile Cardiac Outpatient Telemetry    CT Cardiac Calcium Score Without Dye      2. Chest pressure  CT Cardiac Calcium Score Without Dye      3. Nonrheumatic mitral valve regurgitation        4. Lightheadedness               Plan:       1.  Palpitations: She has had palpitations intermittently for some time.  She describes a fluttering sensation.  In the 1990s, she was treated with a beta-blocker and this really helped.  Her stress test showed some PACs/PVCs.  She is going to wear a 14-day monitor to clarify exactly what she is feeling and then we may try a beta-blocker.    2.  Chest Pressure: Resolved.  Normal stress echocardiogram in November 2023.  She would like to have a CT calcium score completed and the order has been placed.  I explained that this is a screening test and will give her an estimated 5 to 10-year risk of CAD.    3.  Mild mitral regurgitation noted on echocardiogram.  Stable.    4.  Lightheadedness: I encouraged good hydration.      5.  Perioperative Cardiac Risk Assessment: I do not think she is going to require preop cardiac evaluation.  She is planning on having right knee surgery by Dr. Mariano Yee in the near future.  She is acceptable risk from a cardiac standpoint to proceed.    6.  Further recommendations to follow after she wears a 14-day Holter monitor.    As always, it has been a pleasure to participate in your patient's care.  Thank you.         Sincerely,         SYD Flaherty  [facility]      Dictated utilizing Dragon Dictation

## 2024-02-09 ENCOUNTER — PREP FOR SURGERY (OUTPATIENT)
Dept: OTHER | Facility: HOSPITAL | Age: 64
End: 2024-02-09
Payer: MEDICARE

## 2024-02-09 DIAGNOSIS — M17.11 PRIMARY OSTEOARTHRITIS OF RIGHT KNEE: Primary | ICD-10-CM

## 2024-02-09 PROBLEM — Z96.659 S/P TOTAL KNEE ARTHROPLASTY: Status: ACTIVE | Noted: 2024-02-09

## 2024-02-09 RX ORDER — ACETAMINOPHEN 500 MG
1000 TABLET ORAL ONCE
OUTPATIENT
Start: 2024-02-09 | End: 2024-02-09

## 2024-02-09 RX ORDER — PREGABALIN 75 MG/1
150 CAPSULE ORAL ONCE
OUTPATIENT
Start: 2024-02-09 | End: 2024-02-09

## 2024-02-09 RX ORDER — TRANEXAMIC ACID 10 MG/ML
1000 INJECTION, SOLUTION INTRAVENOUS ONCE
OUTPATIENT
Start: 2024-02-09 | End: 2024-02-09

## 2024-02-09 RX ORDER — MELOXICAM 7.5 MG/1
15 TABLET ORAL ONCE
OUTPATIENT
Start: 2024-02-09 | End: 2024-02-09

## 2024-02-12 ENCOUNTER — TELEPHONE (OUTPATIENT)
Dept: ORTHOPEDIC SURGERY | Facility: CLINIC | Age: 64
End: 2024-02-12
Payer: MEDICARE

## 2024-02-15 ENCOUNTER — OUTSIDE FACILITY SERVICE (OUTPATIENT)
Dept: GASTROENTEROLOGY | Facility: CLINIC | Age: 64
End: 2024-02-15
Payer: MEDICARE

## 2024-02-15 ENCOUNTER — LAB REQUISITION (OUTPATIENT)
Dept: LAB | Facility: HOSPITAL | Age: 64
End: 2024-02-15
Payer: MEDICARE

## 2024-02-15 DIAGNOSIS — K22.70 BARRETT'S ESOPHAGUS WITHOUT DYSPLASIA: ICD-10-CM

## 2024-02-15 PROCEDURE — 88305 TISSUE EXAM BY PATHOLOGIST: CPT | Performed by: INTERNAL MEDICINE

## 2024-02-15 PROCEDURE — 43239 EGD BIOPSY SINGLE/MULTIPLE: CPT | Performed by: INTERNAL MEDICINE

## 2024-02-15 PROCEDURE — G0121 COLON CA SCRN NOT HI RSK IND: HCPCS | Performed by: INTERNAL MEDICINE

## 2024-02-15 RX ORDER — ONDANSETRON 4 MG/1
4 TABLET, FILM COATED ORAL EVERY 8 HOURS PRN
Qty: 25 TABLET | Refills: 1 | Status: SHIPPED | OUTPATIENT
Start: 2024-02-15

## 2024-02-16 LAB
LAB AP CASE REPORT: NORMAL
PATH REPORT.FINAL DX SPEC: NORMAL
PATH REPORT.GROSS SPEC: NORMAL

## 2024-02-21 ENCOUNTER — TELEPHONE (OUTPATIENT)
Dept: GASTROENTEROLOGY | Facility: CLINIC | Age: 64
End: 2024-02-21
Payer: MEDICARE

## 2024-02-21 NOTE — TELEPHONE ENCOUNTER
Called pt and advised of Dr Arias's note.  Pt verbalized understanding.     EGD placed in [facility] and recall for 2/15/29.

## 2024-02-21 NOTE — PROGRESS NOTES
Gastric biopsies were normal.  No significant inflammation was seen.  Upper GI biopsies showed mild chronic inflammation with no other concerning changes.  No Monsivais's change was seen on biopsy.  This does not mean that she does not have Monsivais's changes that has been seen previously but it is very limited.  Continue pantoprazole and diet and lifestyle modification to reduce acid reflux symptoms.  Recommend repeat EGD in 5 years to follow-up on Monsivais's
n/a

## 2024-02-21 NOTE — TELEPHONE ENCOUNTER
----- Message from Archana Arias MD sent at 2/21/2024  8:21 AM EST -----  Gastric biopsies were normal.  No significant inflammation was seen.  Upper GI biopsies showed mild chronic inflammation with no other concerning changes.  No Monsivais's change was seen on biopsy.  This does not mean that she does not have Monsivais's changes that has been seen previously but it is very limited.  Continue pantoprazole and diet and lifestyle modification to reduce acid reflux symptoms.  Recommend repeat EGD in 5 years to follow-up on Monsivais's

## 2024-02-26 ENCOUNTER — HOSPITAL ENCOUNTER (OUTPATIENT)
Dept: CT IMAGING | Facility: HOSPITAL | Age: 64
Discharge: HOME | End: 2024-02-26
Admitting: NURSE PRACTITIONER

## 2024-02-26 DIAGNOSIS — R00.2 PALPITATIONS: ICD-10-CM

## 2024-02-26 DIAGNOSIS — R07.89 CHEST PRESSURE: ICD-10-CM

## 2024-02-26 PROCEDURE — 75571 CT HRT W/O DYE W/CA TEST: CPT

## 2024-02-27 ENCOUNTER — HOSPITAL ENCOUNTER (OUTPATIENT)
Dept: PHYSICAL THERAPY | Facility: HOSPITAL | Age: 64
Setting detail: THERAPIES SERIES
Discharge: HOME | End: 2024-02-27
Payer: MEDICARE

## 2024-02-27 ENCOUNTER — PRE-ADMISSION TESTING (OUTPATIENT)
Dept: PREADMISSION TESTING | Facility: HOSPITAL | Age: 64
End: 2024-02-27
Payer: MEDICARE

## 2024-02-27 VITALS
WEIGHT: 127 LBS | SYSTOLIC BLOOD PRESSURE: 126 MMHG | BODY MASS INDEX: 20.41 KG/M2 | OXYGEN SATURATION: 96 % | DIASTOLIC BLOOD PRESSURE: 80 MMHG | RESPIRATION RATE: 16 BRPM | HEART RATE: 80 BPM | HEIGHT: 66 IN

## 2024-02-27 DIAGNOSIS — M17.11 PRIMARY OSTEOARTHRITIS OF RIGHT KNEE: ICD-10-CM

## 2024-02-27 LAB
ABO GROUP BLD: NORMAL
ANION GAP SERPL CALCULATED.3IONS-SCNC: 9.7 MMOL/L (ref 5–15)
APTT PPP: 24.2 SECONDS (ref 24.3–38.1)
BASOPHILS # BLD AUTO: 0.05 10*3/MM3 (ref 0–0.2)
BASOPHILS NFR BLD AUTO: 0.9 % (ref 0–1.5)
BILIRUB UR QL STRIP: NEGATIVE
BLD GP AB SCN SERPL QL: NEGATIVE
BUN SERPL-MCNC: 14 MG/DL (ref 8–23)
BUN/CREAT SERPL: 20.6 (ref 7–25)
CALCIUM SPEC-SCNC: 9.1 MG/DL (ref 8.6–10.5)
CHLORIDE SERPL-SCNC: 101 MMOL/L (ref 98–107)
CLARITY UR: CLEAR
CO2 SERPL-SCNC: 28.3 MMOL/L (ref 22–29)
COLOR UR: YELLOW
CREAT SERPL-MCNC: 0.68 MG/DL (ref 0.57–1)
DEPRECATED RDW RBC AUTO: 45.5 FL (ref 37–54)
EGFRCR SERPLBLD CKD-EPI 2021: 96.2 ML/MIN/1.73
EOSINOPHIL # BLD AUTO: 0.11 10*3/MM3 (ref 0–0.4)
EOSINOPHIL NFR BLD AUTO: 2.1 % (ref 0.3–6.2)
ERYTHROCYTE [DISTWIDTH] IN BLOOD BY AUTOMATED COUNT: 12.4 % (ref 12.3–15.4)
GLUCOSE SERPL-MCNC: 104 MG/DL (ref 65–99)
GLUCOSE UR STRIP-MCNC: NEGATIVE MG/DL
HBA1C MFR BLD: 5.4 % (ref 4.8–5.6)
HCT VFR BLD AUTO: 42.1 % (ref 34–46.6)
HGB BLD-MCNC: 13.9 G/DL (ref 12–15.9)
HGB UR QL STRIP.AUTO: NEGATIVE
IMM GRANULOCYTES # BLD AUTO: 0.02 10*3/MM3 (ref 0–0.05)
IMM GRANULOCYTES NFR BLD AUTO: 0.4 % (ref 0–0.5)
INR PPP: 0.88 (ref 0.9–1.1)
KETONES UR QL STRIP: NEGATIVE
LEUKOCYTE ESTERASE UR QL STRIP.AUTO: NEGATIVE
LYMPHOCYTES # BLD AUTO: 1.61 10*3/MM3 (ref 0.7–3.1)
LYMPHOCYTES NFR BLD AUTO: 30.6 % (ref 19.6–45.3)
MCH RBC QN AUTO: 32.9 PG (ref 26.6–33)
MCHC RBC AUTO-ENTMCNC: 33 G/DL (ref 31.5–35.7)
MCV RBC AUTO: 99.5 FL (ref 79–97)
MONOCYTES # BLD AUTO: 0.43 10*3/MM3 (ref 0.1–0.9)
MONOCYTES NFR BLD AUTO: 8.2 % (ref 5–12)
NEUTROPHILS NFR BLD AUTO: 3.05 10*3/MM3 (ref 1.7–7)
NEUTROPHILS NFR BLD AUTO: 57.8 % (ref 42.7–76)
NITRITE UR QL STRIP: NEGATIVE
NRBC BLD AUTO-RTO: 0 /100 WBC (ref 0–0.2)
PH UR STRIP.AUTO: 6 [PH] (ref 4.5–8)
PLATELET # BLD AUTO: 277 10*3/MM3 (ref 140–450)
PMV BLD AUTO: 10.9 FL (ref 6–12)
POTASSIUM SERPL-SCNC: 3.8 MMOL/L (ref 3.5–5.2)
PROT UR QL STRIP: NEGATIVE
PROTHROMBIN TIME: 12 SECONDS (ref 12.1–15)
RBC # BLD AUTO: 4.23 10*6/MM3 (ref 3.77–5.28)
RH BLD: POSITIVE
SODIUM SERPL-SCNC: 139 MMOL/L (ref 136–145)
SP GR UR STRIP: 1.01 (ref 1–1.03)
T&S EXPIRATION DATE: NORMAL
UROBILINOGEN UR QL STRIP: NORMAL
WBC NRBC COR # BLD AUTO: 5.27 10*3/MM3 (ref 3.4–10.8)

## 2024-02-27 PROCEDURE — 86850 RBC ANTIBODY SCREEN: CPT | Performed by: ORTHOPAEDIC SURGERY

## 2024-02-27 PROCEDURE — 86900 BLOOD TYPING SEROLOGIC ABO: CPT | Performed by: ORTHOPAEDIC SURGERY

## 2024-02-27 PROCEDURE — 81003 URINALYSIS AUTO W/O SCOPE: CPT | Performed by: ORTHOPAEDIC SURGERY

## 2024-02-27 PROCEDURE — 85730 THROMBOPLASTIN TIME PARTIAL: CPT | Performed by: ORTHOPAEDIC SURGERY

## 2024-02-27 PROCEDURE — 85610 PROTHROMBIN TIME: CPT | Performed by: ORTHOPAEDIC SURGERY

## 2024-02-27 PROCEDURE — 80048 BASIC METABOLIC PNL TOTAL CA: CPT | Performed by: ORTHOPAEDIC SURGERY

## 2024-02-27 PROCEDURE — 85025 COMPLETE CBC W/AUTO DIFF WBC: CPT | Performed by: ORTHOPAEDIC SURGERY

## 2024-02-27 PROCEDURE — 86901 BLOOD TYPING SEROLOGIC RH(D): CPT | Performed by: ORTHOPAEDIC SURGERY

## 2024-02-27 PROCEDURE — 87081 CULTURE SCREEN ONLY: CPT | Performed by: ORTHOPAEDIC SURGERY

## 2024-02-27 PROCEDURE — 83036 HEMOGLOBIN GLYCOSYLATED A1C: CPT | Performed by: ORTHOPAEDIC SURGERY

## 2024-02-27 PROCEDURE — 36415 COLL VENOUS BLD VENIPUNCTURE: CPT

## 2024-02-27 NOTE — DISCHARGE INSTRUCTIONS
PRE-ADMISSION TESTING INSTRUCTIONS FOR TOTAL JOINT PATIENTS    Take these medications the morning of surgery with a small sip of water:  pantoprazole      No aspirin, advil, aleve, ibuprofen, naproxen, diet pills, decongestants, or vitamin/herbal supplements for a week prior to your surgery.     Tylenol/Acetaminophen ok to take if needed.    Do not take any insulin or diabetes medications the morning of surgery.      General Instructions:    DO NOT EAT SOLID FOOD AFTER MIDNIGHT THE NIGHT BEFORE SURGERY. No gum, mints, or hard candy after midnight the night before surgery.  You may drink clear liquids the day of surgery up until 2 hours before your arrival time.  Clear liquids are liquids you can see through. Nothing RED in color.    Plain water    Sports drinks      Gelatin (Jell-O)  Fruit juices without pulp such as white grape juice and apple juice  Popsicles that contain no fruit or yogurt  Tea or coffee (no cream or milk added)    It is beneficial for you to have a clear drink that contains carbohydrates 2 hours prior to your arrival time.  DRINK A BOTTLE OF SPORTS DRINK 2 HOURS BEFORE YOUR ARRIVAL TIME. IF YOU ARE DIABETIC, DRINK A LOW OR NO SUGAR SPORTS DRINK. ANY COLOR EXCEPT RED.    Patients who avoid smoking, chewing tobacco and alcohol for 4 weeks prior to surgery have a reduced risk of post-operative complications.  If at all possible, quit smoking as many days before surgery as you can.    Do not smoke, use chewing tobacco or drink alcohol after midnight the day of surgery.    Bring your C-PAP/ BI-PAP machine if you use one.  Wear clean comfortable clothes.  Do not wear contact lenses, lotion, deodorant, or make-up.  Bring a case for your glasses if applicable.   You may brush your teeth the morning of surgery.  You may wear dentures/partials, do not put adhesive/glue on them.  Leave all other jewelry and valuables at home.  NOTIFY YOUR SURGEON IF YOU BECOME ILL, HAVE A FEVER, PRODUCTIVE COUGH, OR CANNOT  BE HERE THE DAY OF SURGERY.      Preventing a Surgical Site Infection:    Shower the night before and on the morning of surgery using the chlorhexidine soap you were given.  Use a clean washcloth with the soap.  Place clean sheets on your bed after showering the night before surgery. Do not use the CHG soap on your hair, face, or private areas. Wash your body gently for five (5) minutes. Do not scrub your skin.  Dry with a clean towel and dress in clean clothing.    Do not shave the surgical area for 10 days-2 weeks prior to surgery  because the razor can irritate skin and make it easier to develop an infection.  Make sure you, your family, and all healthcare providers clean their hands with soap and water or an alcohol based hand  before caring for you or your wound.      Day of surgery:    Your surgeon’s office will advise you of your arrival time for the day of surgery.    Upon arrival, a Pre-op nurse and Anesthesia provider will review your health history, obtain vital signs, and answer questions you may have. The anesthesia provider will also discuss the type of anesthesia that will be needed for your procedure, which may include general anesthesia. The only belongings needed at this time will be your home medications and if applicable your C-PAP/BI-PAP machine.  If you are staying overnight your family can leave the rest of your belongings in the car and bring them to your room later.  A Pre-op nurse will start an IV and you may receive medication in preparation for surgery, including something to help you relax.  Your family will be able to see you in the Pre-op area.  While you are in surgery your family should notify the waiting room  if they leave the waiting room area and provide a contact phone number.    If you have any questions, you can call the Pre-Admission Department at [phone] or your surgeon's office.    Please be aware that surgery does come with discomfort.  We want  to make every effort to control your discomfort so please discuss any uncontrolled symptoms with your nurse.   Your doctor will most likely have prescribed pain medications.     You may have bruising or discomfort from the tourniquet used in surgery.     Please leave all luggage in the car the morning of surgery.  You will be transported to your hospital room following the recovery period.  Your family can get your luggage at that time.      You may receive a survey regarding the care you received. Your feedback is very important and will be used to collect the necessary data to help us to continue to provide excellent care.    Regular rate and rhythm, Heart sounds S1 S2 present, no murmurs, rubs or gallops

## 2024-02-27 NOTE — PAT
Pt here for PAT visit.  Pre-op tests completed, chg soap given, and instructions reviewed.  Instructed clears until 2 hrs prior to arrival time, voiced understanding. ERAS handouts given and reviewed, voiced understanding. [facility] pain pump brochure given and reviewed, voiced understanding.

## 2024-02-28 LAB — MRSA SPEC QL CULT: NORMAL

## 2024-02-28 NOTE — PROGRESS NOTES
Calcium score 0. Ms. Delgado was informed of results and verbalized understanding. She turned in the holter monitor a week; results still pending.

## 2024-02-29 ENCOUNTER — TELEPHONE (OUTPATIENT)
Dept: CARDIOLOGY | Facility: CLINIC | Age: 64
End: 2024-02-29
Payer: MEDICARE

## 2024-02-29 NOTE — TELEPHONE ENCOUNTER
I spoke with Ms. Delgado about Holter monitor results.  She said over the past 2 weeks she feels better.  I offered starting a beta-blocker and we discussed the risk versus benefits of the beta-blocker.  At this time she does not want to start a new medication.  She will continue with the magnesium.  I asked her to call with concerns.  She will follow-up with Dr. Alvarez in 6 to 9 months.

## 2024-03-01 ENCOUNTER — PREP FOR SURGERY (OUTPATIENT)
Dept: OTHER | Facility: HOSPITAL | Age: 64
End: 2024-03-01
Payer: MEDICARE

## 2024-03-01 DIAGNOSIS — M17.11 PRIMARY OSTEOARTHRITIS OF RIGHT KNEE: Primary | ICD-10-CM

## 2024-03-05 NOTE — DISCHARGE INSTRUCTIONS
Total Knee Replacement Discharge Instructions:    I. ACTIVITIES:  1. Exercises:  Complete exercise program as taught by the hospital physical therapist 2 times per day  Exercise program will be advanced by the physical therapist  During the day be up ambulating every 2 hours (while awake) for short distances  Complete the ankle pump exercises at least 10 times per hour (while awake)  Elevate legs most of the day the first week post operatively and thereafter elevate legs when in bed and for at least 30 minutes during the day. Caution must be taken to avoid pillow placement under the bend of the knee as this can lead to flexion contractures of the knee.  Use cold packs 20-30 minutes approximately 5 times per day. This should be done before and after completing your exercises and at any time you are experiencing pain/ stiffness in your operative extremity.  Apply 6 inch ace wraps to operative extremity from ankle to groin. Please keep in place at all times except when bathing.      2. Activities of Daily Living:  No tub baths, hot tubs, or swimming pools for 4 weeks  May shower on post-operative day #3- Do not scrub or rub around the incision or mesh. No soap or alcohol to incision, just let water run over wound.         II. RESTRICTIONS  Do not cross legs or kneel  Your surgeon will discuss with you when you will be able to drive again.  Weight bearing as tolerated  First week stay inside on even terrain. May go up and down stairs one stair at a time utilizing the hand rail  After one week, you may venture outside.     III. PRECAUTIONS:  Everyone that comes near you should wash their hands  No elective dental, genital-urinary, or colon procedures or surgical procedures for 12 weeks after surgery unless absolutely necessary.   If dental work or surgical procedure is deemed absolutely necessary, you will need to contact your surgeon as you will need to take antibiotics 1 hour prior to any dental work (including teeth  cleanings).  Please discuss with your surgeon prophylactic antibiotics as the length of time this intervention will be necessary for you varies with each patient’s health history and situation.  Avoid sick people. If you must be around someone who is ill, they should wear a mask.  Avoid visits to the Emergency Room or Urgent Care unless you are having a life              threatening event.     IV. INCISION CARE:  Wash your hands prior to dressing changes  Incision and knee should be covered with an ACE wrap daily for compression. No creams or ointments to the incision  Do not touch or pick at the incision  Check incision every day and notify surgeon immediately if any of the following signs or symptoms are noted:  Increase in redness  Increase in swelling around the incision and of the entire extremity  Increase in pain  Drainage oozing from the incision  Pulling apart of the edges of the incision  Increase in overall body temperature (greater than 100.5 degrees)  You will be given further instructions on removal of the glue and mesh over your incision at your first follow up visit at the office.     V. MEDICATIONS:   1. Anticoagulants: You will be discharged on an anticoagulant, typically either Aspirin or Eliquis. This is a prophylactic medication that helps prevent blood clots during your post-operative period. The type and length of dosage varies based on your individual needs, procedure performed, and surgeon’s preference.  While taking the anticoagulant, you should avoid taking any additional aspirin, ibuprofen (Advil or Motrin), Aleve (Naprosyn) or other non-steroidal anti-inflammatory medications.   Notify surgeon immediately if any edil bleeding is noted in the urine, stool, emesis, or from the nose or the incision. Blood in the stool will often appear as black rather than red. Blood in urine may appear as pink. Blood in emesis may appear as brown/black like coffee grounds.  You will need to apply pressure  for longer periods of time to any cuts or abrasions to stop bleeding  Avoid alcohol while taking anticoagulants    2. Stool Softeners: You will be at greater risk of constipation after surgery due to being less mobile and the pain medications.   Take stool softeners as instructed by your surgeon while on pain medications. Over the counter Colace 100 mg 1-2 capsules twice daily.   If stools become too loose or too frequent, please decreases the dosage or stop the stool softener.  If constipation occurs despite use of stool softeners, you are to continue the stool softeners and add a laxative (Milk of Magnesia 1 ounce daily as needed)  Drink plenty of fluids, and eat fruits and vegetables during your recovery time    3. Pain Medications utilized after surgery are narcotics and the law requires that the following information be given to all patients that are prescribed narcotics:  CLASSIFICATION: Pain medications are called Opioids and are narcotics  LEGALITIES: It is illegal to share narcotics with others and to drive within 24 hours of taking narcotics  POTENTIAL SIDE EFFECTS: Potential side effects of opioids include: nausea, vomiting, itching, dizziness, drowsiness, dry mouth, constipation, and difficulty urinating.  POTENTIAL ADVERSE EFFECTS:   Opioid tolerance can develop with use of pain medications and this simply means that it requires more and more of the medication to control pain; however, this is seen more in patients that use opioids for longer periods of time.  Opioid dependence can develop with use of Opioids and this simply means that to stop the medication can cause withdrawal symptoms; however, this is seen with patients that use Opioids for longer periods of time.  Opioid addiction can develop with use of Opioids and the incidence of this is very unlikely in patients who take the medications as ordered and stop the medications as instructed.  Opioid overdose can be dangerous, but is unlikely when  the medication is taken as ordered and stopped when ordered. It is important not to mix opioids with alcohol or with and type of sedative such as Benadryl as this can lead to over sedation and respiratory difficulty.  DOSAGE:   Pain medications will need to be taken consistently for the first week to decrease pain and promote adequate pain relief and participation in physical therapy.  After the initial surgical pain begins to resolve, you may begin to decrease the pain medication. By the end of 6 weeks, you should be off of pain medications.  Refills will not be given by the office during evening hours, on weekends, or after 12 weeks post-op.  To seek refills on pain medications during the initial 6 week post-operative period, you must call the office 48 hours in advance to request the refill. The office will then notify you when to  the prescription. DO NOT wait until you are out of the medication to request a refill.    VI. FOLLOW-UP VISITS:  You will need to follow up in the office with Beverley Zhao Nurse Practitioner in 2 weeks. Please call  [phone]  to schedule this appointment.  You will need to follow up with your primary care physician within 4 weeks.  If you have any concerns or suspected complications prior to your follow up visit, please call your surgeons office. Do not wait until your appointment time if you suspect complications. These will need to be addressed in the office promptly.

## 2024-03-05 NOTE — CASE MANAGEMENT/SOCIAL WORK
Continued Stay Note  LAMAR Parker     Patient Name: Zoila Delgado  MRN: [id number]  Today's Date: 3/5/2024    Admit Date: (Not on file)    Plan: Home with family and OP PT at [facility]   Discharge Plan       Row Name 03/05/24 1413       Plan    Plan Home with family and OP PT at [facility]    Patient/Family in Agreement with Plan yes    Plan Comments CM spoke with patient today regarding pre admission discharge planning for her surgery with Dr. Yee on 3/12/24. Patient states she lives with Anahi Boles, , in a single story house with three steps and no handrail to gain entry. She states she currently has no issues entering the home or maneuvering inside. She also states her friend Anahi will be able to assist with needs at home and provide ride at discharge. We discussed her  plans for physical therapy and she states she would like to go to [facility] in [place] and is agreeable for CM to arrange this service. We then discussed DME and she states she is borrowing a rolling walker and BSC from a friend and has a taller commode at home and does not anticipate needing any other equipment at discharge. Patient states she plans on returning home at discharge with her friend to help as needed and OP PT. She had no other questions or concerns regarding discharge plans. MIGUEL called and spoke with Suzette at [facility] and she has scheduled an appointment for 3/14/24 @ 3:15pm. CM will follow.                   Discharge Codes    No documentation.                       Trina Brito RN

## 2024-03-06 ENCOUNTER — DOCUMENTATION (OUTPATIENT)
Dept: ORTHOPEDIC SURGERY | Facility: CLINIC | Age: 64
End: 2024-03-06
Payer: MEDICARE

## 2024-03-06 RX ORDER — DOXYCYCLINE HYCLATE 100 MG/1
100 CAPSULE ORAL 2 TIMES DAILY
Qty: 10 CAPSULE | Refills: 0 | Status: SHIPPED | OUTPATIENT
Start: 2024-03-06 | End: 2024-03-11

## 2024-03-07 ENCOUNTER — OFFICE VISIT (OUTPATIENT)
Dept: ORTHOPEDIC SURGERY | Facility: CLINIC | Age: 64
End: 2024-03-07
Payer: MEDICARE

## 2024-03-07 VITALS — WEIGHT: 127 LBS | HEIGHT: 66 IN | BODY MASS INDEX: 20.41 KG/M2

## 2024-03-07 DIAGNOSIS — M17.11 PRIMARY OSTEOARTHRITIS OF RIGHT KNEE: Primary | ICD-10-CM

## 2024-03-07 PROCEDURE — 99024 POSTOP FOLLOW-UP VISIT: CPT | Performed by: ORTHOPAEDIC SURGERY

## 2024-03-07 NOTE — PROGRESS NOTES
Cc: f/u right knee pain, DJD    Patient presented to clinic today for preoperative history and physical visit in anticipation of upcoming scheduled right total knee arthroplasty.    I reviewed anatomy and a model of a total knee arthroplasty, as well as typical postoperative recovery of 6-12 months before maximal recovery, and possible need for rehabilitation stay after hospitalization. We also discussed risks, benefits, and alternatives of procedure with risks including but not limited to neurovascular damage, bleeding, infection, malalignment, chronic pian, failure of implants, osteolysis, loosening of implants, loss of motion, weakness, stiffness, instability, DVT, pulmonary embolus, death, stroke, complex regional pain syndrome, myocardial infarction, and need for additional procedures. Patient understood all these and had all questions answered before consenting to the procedure. No guarantees were given in regards to results from the surgery.  Patient has been medically optimize by his primary care physician.    Postoperative DVT prophylaxis will be with aspirin     All remaining questions answered today.

## 2024-03-07 NOTE — H&P (VIEW-ONLY)
History & Physical       Patient: Zoila Delgado    YOB: 1960    Medical Record Number: [id number]    Surgeon:  Dr. Mariano Yee    Chief Complaints:   Chief Complaint   Patient presents with    Right Knee - Pre-op Exam       Subjective:  This problem is not new to this examiner.     History of Present Illness: 64 y.o. female presents with   Chief Complaint   Patient presents with    Right Knee - Pre-op Exam   . Onset of symptoms was years ago and has been progressively worsening despite more conservative treatment measures.  Symptoms are associated with ability to move, exercise, and perform activities of daily living.  Symptoms are aggravated by weight bearing and ROM necessary for activities of daily living.   Symptoms improve with rest, ice and elevation only minimally.      Allergies: No Known Allergies    Medications:   Home Medications:  Current Outpatient Medications on File Prior to Visit   Medication Sig    doxycycline (VIBRAMYCIN) 100 MG capsule Take 1 capsule by mouth 2 (Two) Times a Day for 5 days.    pantoprazole (PROTONIX) 40 MG EC tablet Take 1 tablet by mouth 2 (Two) Times a Day.    B Complex Vitamins (vitamin b complex) capsule capsule Take  by mouth Daily. (Patient not taking: Reported on 3/7/2024)    Calcium-Vitamin D-Vitamin K (VIACTIV PO) Take 1 tablet by mouth 2 (Two) Times a Day. (Patient not taking: Reported on 3/7/2024)    Magnesium 100 MG tablet Take 400 mg by mouth Every Night. (Patient not taking: Reported on 3/7/2024)    Monovisc 88 MG/4ML solution prefilled syringe injection  (Patient not taking: Reported on 3/7/2024)    Multiple Vitamins-Minerals (QC MULTI-MORENO PO) Take 1 tablet by mouth Daily. (Patient not taking: Reported on 3/7/2024)    ondansetron (Zofran) 4 MG tablet Take 1 tablet by mouth Every 8 (Eight) Hours As Needed for Nausea or Vomiting. (Patient not taking: Reported on 3/7/2024)    Potassium Gluconate 550 MG tablet Take 550 mg by mouth Daily. (Patient  not taking: Reported on 3/7/2024)    rosuvastatin (CRESTOR) 20 MG tablet Take 1 tablet by mouth. (Patient not taking: Reported on 3/7/2024)    vitamin C (ASCORBIC ACID) 500 MG tablet Take 2 tablets by mouth 2 (Two) Times a Day. (Patient not taking: Reported on 3/7/2024)     No current facility-administered medications on file prior to visit.     Current Medications:  Scheduled Meds:  Continuous Infusions:No current facility-administered medications for this visit.    PRN Meds:.    I have reviewed the patient's medical history in detail and updated the computerized patient record.  Review and summarization of old records include:    Past Medical History:   Diagnosis Date    Ankle sprain     Arthritis     Arthritis of back Jan 22    Dr. Owen    Monsivais esophagus     Cervical disc disorder     Diverticular disease     Diverticulitis     Dyspepsia     Gastritis     GERD (gastroesophageal reflux disease)     HH (hiatus hernia)     Hip arthrosis July 27 2022    Dr Yee did Mri    Hyperlipidemia     Knee swelling Several years ago    Dr. Yee    Low back pain     Lumbar disc disease with radiculopathy 01/31/2023    Palpitations     Perirectal abscess     PONV (postoperative nausea and vomiting)     after ablation    Renal cyst     left    Rotator cuff syndrome     Severe motion sickness     Shoulder injury     Trochanteric bursitis of left hip 01/31/2023        Past Surgical History:   Procedure Laterality Date    COLONOSCOPY  11/30/2015    tics,nibh    ENDOMETRIAL ABLATION      x2    ENDOSCOPY  11/30/2015    HH, Z-line irregular, 42 cm. fromincisors, chronic inflammation    ENDOSCOPY N/A 05/07/2019    [facility], [facility]    TONSILLECTOMY  1968        Social History     Occupational History    Occupation: RETIRED    Tobacco Use    Smoking status: Never    Smokeless tobacco: Never   Vaping Use    Vaping status: Never Used   Substance and Sexual Activity    Alcohol use: No    Drug use: No    Sexual activity: Yes      Partners: Female     Birth control/protection: Post-menopausal      Social History     Social History Narrative    Not on file        Family History   Problem Relation Age of Onset    Hypertension Mother     Cancer Mother     Hypertension Father     Breast cancer Sister     Cancer Sister     Stroke Maternal Grandmother     Malig Hyperthermia Neg Hx        ROS: 14 point review of systems was performed and was negative except for documented findings in HPI and today's encounter.     Allergies: No Known Allergies  Constitutional:  Denies fever, shaking or chills   Eyes:  Denies change in visual acuity   HENT:  Denies nasal congestion or sore throat   Respiratory:  Denies cough or shortness of breath   Cardiovascular:  Denies chest pain or severe LE edema   GI:  Denies abdominal pain, nausea, vomiting, bloody stools or diarrhea   Musculoskeletal:  Denies numbness tingling or loss of motor function except as outlined above in history of present illness.  : Denies painful urination or hematuria  Integument:  Denies rash, lesion or ulceration   Neurologic:  Denies headache or focal weakness  Endocrine:  Denies lymphadenopathy  Psych:  Denies confusion or change in mental status   Hem:  Denies active bleeding    Physical Exam: 64 y.o. female  Body mass index is 20.5 kg/m².  There were no vitals filed for this visit.  Vital signs reviewed.   General Appearance:    Alert, cooperative, in no acute distress                  Eyes: conjunctivae clear  ENT: external ears and nose atraumatic  CV: no peripheral edema  Resp: normal respiratory effort  Skin: no rashes or wounds; normal turgor  Psych: mood and affect appropriate  Lymph: no nodes appreciated  Neuro: gross sensation intact  Vascular:  Palpable peripheral pulse in noted extremity  Musculoskeletal Extremities:   Right Knee-     Active Range of Motion- 0 to 135 degrees  4+/5 on flexion  4+/5 on extension  Maximal tenderness to palpation along the lateral joint line as  well as lateral patellar facet.  Lizz's exam- Moderately positive along the lateral joint line, no click.  Effusion- Moderate  Stable opening on varus and valgus stress at 0 and 30  Moderate valgus alignment.  Active patellar compression test- Positive    Debilities/Disabilities Identified: None      Diagnostic Tests:  Pre-Admission Testing on 02/27/2024   Component Date Value Ref Range Status    Color, UA 02/27/2024 Yellow  Yellow, Straw Final    Appearance, UA 02/27/2024 Clear  Clear Final    pH, UA 02/27/2024 6.0  4.5 - 8.0 Final    Specific Gravity, UA 02/27/2024 1.010  1.003 - 1.030 Final    Glucose, UA 02/27/2024 Negative  Negative Final    Ketones, UA 02/27/2024 Negative  Negative Final    Bilirubin, UA 02/27/2024 Negative  Negative Final    Blood, UA 02/27/2024 Negative  Negative Final    Protein, UA 02/27/2024 Negative  Negative Final    Leuk Esterase, UA 02/27/2024 Negative  Negative Final    Nitrite, UA 02/27/2024 Negative  Negative Final    Urobilinogen, UA 02/27/2024 0.2 E.U./dL  0.2 - 1.0 E.U./dL Final    PTT 02/27/2024 24.2 (L)  24.3 - 38.1 seconds Final    Protime 02/27/2024 12.0 (L)  12.1 - 15.0 Seconds Final    INR 02/27/2024 0.88 (L)  0.90 - 1.10 Final    Glucose 02/27/2024 104 (H)  65 - 99 mg/dL Final    BUN 02/27/2024 14  8 - 23 mg/dL Final    Creatinine 02/27/2024 0.68  0.57 - 1.00 mg/dL Final    Sodium 02/27/2024 139  136 - 145 mmol/L Final    Potassium 02/27/2024 3.8  3.5 - 5.2 mmol/L Final    Chloride 02/27/2024 101  98 - 107 mmol/L Final    CO2 02/27/2024 28.3  22.0 - 29.0 mmol/L Final    Calcium 02/27/2024 9.1  8.6 - 10.5 mg/dL Final    BUN/Creatinine Ratio 02/27/2024 20.6  7.0 - 25.0 Final    Anion Gap 02/27/2024 9.7  5.0 - 15.0 mmol/L Final    eGFR 02/27/2024 96.2  >60.0 mL/min/1.73 Final    MRSA Screen Cx 02/27/2024 No Methicillin Resistant Staphylococcus aureus isolated   Final    ABO Type 02/27/2024 A   Final    RH type 02/27/2024 Positive   Final    Antibody Screen 02/27/2024  Negative   Final    T&S Expiration Date 02/27/2024 3/12/2024 11:59:00 PM   Final    Hemoglobin A1C 02/27/2024 5.40  4.80 - 5.60 % Final    WBC 02/27/2024 5.27  3.40 - 10.80 10*3/mm3 Final    RBC 02/27/2024 4.23  3.77 - 5.28 10*6/mm3 Final    Hemoglobin 02/27/2024 13.9  12.0 - 15.9 g/dL Final    Hematocrit 02/27/2024 42.1  34.0 - 46.6 % Final    MCV 02/27/2024 99.5 (H)  79.0 - 97.0 fL Final    MCH 02/27/2024 32.9  26.6 - 33.0 pg Final    MCHC 02/27/2024 33.0  31.5 - 35.7 g/dL Final    RDW 02/27/2024 12.4  12.3 - 15.4 % Final    RDW-SD 02/27/2024 45.5  37.0 - 54.0 fl Final    MPV 02/27/2024 10.9  6.0 - 12.0 fL Final    Platelets 02/27/2024 277  140 - 450 10*3/mm3 Final    Neutrophil % 02/27/2024 57.8  42.7 - 76.0 % Final    Lymphocyte % 02/27/2024 30.6  19.6 - 45.3 % Final    Monocyte % 02/27/2024 8.2  5.0 - 12.0 % Final    Eosinophil % 02/27/2024 2.1  0.3 - 6.2 % Final    Basophil % 02/27/2024 0.9  0.0 - 1.5 % Final    Immature Grans % 02/27/2024 0.4  0.0 - 0.5 % Final    Neutrophils, Absolute 02/27/2024 3.05  1.70 - 7.00 10*3/mm3 Final    Lymphocytes, Absolute 02/27/2024 1.61  0.70 - 3.10 10*3/mm3 Final    Monocytes, Absolute 02/27/2024 0.43  0.10 - 0.90 10*3/mm3 Final    Eosinophils, Absolute 02/27/2024 0.11  0.00 - 0.40 10*3/mm3 Final    Basophils, Absolute 02/27/2024 0.05  0.00 - 0.20 10*3/mm3 Final    Immature Grans, Absolute 02/27/2024 0.02  0.00 - 0.05 10*3/mm3 Final    nRBC 02/27/2024 0.0  0.0 - 0.2 /100 WBC Final     No results found.    Right Knee X-Ray  Indication: Pain    AP, Lateral, and [place] views    Findings:  No fracture  No bony lesion  Normal soft tissues  Moderate tricompartmental DJD    Compared to prior office xrays      Assessment:  Right knee pain, DJD     Plan:  I reviewed anatomy of a total joint arthroplasty in laymen's terms, as well as typical postoperative recovery and possibly 6-12 months for maximal recovery, and possible need for rehabilitation stay after hospitalization. We also  discussed risks, benefits, alternatives, and limitations of procedure with risks including but not limited to neurovascular damage, bleeding, infection, malalignment, chronic pain, failure of implants, osteolysis, loosening of implants, loss of motion, weakness, stiffness, instability, DVT, pulmonary embolus, death, stroke, complex regional pain syndrome, myocardial infarction, and need for additional procedures. No guarantees were given regarding results of surgery.      Zoila Delgado was given the opportunity to ask and have all questions answered today.  The patient voiced understanding of the risks, benefits, and alternative forms of treatment that were discussed and the patient consents to proceed with surgery.     Patient's blood clot history is negative.    Plan for DVT prophylaxis is ASA    Patient's MRSA infection history is negative.    Patient's skin infection history is negative.    Discharge Plan: POD 0-1 to home    Date: 3/7/2024    Dictated utilizing Dragon dictation

## 2024-03-07 NOTE — H&P
History & Physical       Patient: Zoila Delgado    YOB: 1960    Medical Record Number: [id number]    Surgeon:  Dr. Mariano Yee    Chief Complaints:   Chief Complaint   Patient presents with    Right Knee - Pre-op Exam       Subjective:  This problem is not new to this examiner.     History of Present Illness: 64 y.o. female presents with   Chief Complaint   Patient presents with    Right Knee - Pre-op Exam   . Onset of symptoms was years ago and has been progressively worsening despite more conservative treatment measures.  Symptoms are associated with ability to move, exercise, and perform activities of daily living.  Symptoms are aggravated by weight bearing and ROM necessary for activities of daily living.   Symptoms improve with rest, ice and elevation only minimally.      Allergies: No Known Allergies    Medications:   Home Medications:  Current Outpatient Medications on File Prior to Visit   Medication Sig    doxycycline (VIBRAMYCIN) 100 MG capsule Take 1 capsule by mouth 2 (Two) Times a Day for 5 days.    pantoprazole (PROTONIX) 40 MG EC tablet Take 1 tablet by mouth 2 (Two) Times a Day.    B Complex Vitamins (vitamin b complex) capsule capsule Take  by mouth Daily. (Patient not taking: Reported on 3/7/2024)    Calcium-Vitamin D-Vitamin K (VIACTIV PO) Take 1 tablet by mouth 2 (Two) Times a Day. (Patient not taking: Reported on 3/7/2024)    Magnesium 100 MG tablet Take 400 mg by mouth Every Night. (Patient not taking: Reported on 3/7/2024)    Monovisc 88 MG/4ML solution prefilled syringe injection  (Patient not taking: Reported on 3/7/2024)    Multiple Vitamins-Minerals (QC MULTI-MORENO PO) Take 1 tablet by mouth Daily. (Patient not taking: Reported on 3/7/2024)    ondansetron (Zofran) 4 MG tablet Take 1 tablet by mouth Every 8 (Eight) Hours As Needed for Nausea or Vomiting. (Patient not taking: Reported on 3/7/2024)    Potassium Gluconate 550 MG tablet Take 550 mg by mouth Daily. (Patient  not taking: Reported on 3/7/2024)    rosuvastatin (CRESTOR) 20 MG tablet Take 1 tablet by mouth. (Patient not taking: Reported on 3/7/2024)    vitamin C (ASCORBIC ACID) 500 MG tablet Take 2 tablets by mouth 2 (Two) Times a Day. (Patient not taking: Reported on 3/7/2024)     No current facility-administered medications on file prior to visit.     Current Medications:  Scheduled Meds:  Continuous Infusions:No current facility-administered medications for this visit.    PRN Meds:.    I have reviewed the patient's medical history in detail and updated the computerized patient record.  Review and summarization of old records include:    Past Medical History:   Diagnosis Date    Ankle sprain     Arthritis     Arthritis of back Jan 22    Dr. Owen    Monsivais esophagus     Cervical disc disorder     Diverticular disease     Diverticulitis     Dyspepsia     Gastritis     GERD (gastroesophageal reflux disease)     HH (hiatus hernia)     Hip arthrosis July 27 2022    Dr Yee did Mri    Hyperlipidemia     Knee swelling Several years ago    Dr. Yee    Low back pain     Lumbar disc disease with radiculopathy 01/31/2023    Palpitations     Perirectal abscess     PONV (postoperative nausea and vomiting)     after ablation    Renal cyst     left    Rotator cuff syndrome     Severe motion sickness     Shoulder injury     Trochanteric bursitis of left hip 01/31/2023        Past Surgical History:   Procedure Laterality Date    COLONOSCOPY  11/30/2015    tics,nibh    ENDOMETRIAL ABLATION      x2    ENDOSCOPY  11/30/2015    HH, Z-line irregular, 42 cm. fromincisors, chronic inflammation    ENDOSCOPY N/A 05/07/2019    [facility], [facility]    TONSILLECTOMY  1968        Social History     Occupational History    Occupation: RETIRED    Tobacco Use    Smoking status: Never    Smokeless tobacco: Never   Vaping Use    Vaping status: Never Used   Substance and Sexual Activity    Alcohol use: No    Drug use: No    Sexual activity: Yes      Partners: Female     Birth control/protection: Post-menopausal      Social History     Social History Narrative    Not on file        Family History   Problem Relation Age of Onset    Hypertension Mother     Cancer Mother     Hypertension Father     Breast cancer Sister     Cancer Sister     Stroke Maternal Grandmother     Malig Hyperthermia Neg Hx        ROS: 14 point review of systems was performed and was negative except for documented findings in HPI and today's encounter.     Allergies: No Known Allergies  Constitutional:  Denies fever, shaking or chills   Eyes:  Denies change in visual acuity   HENT:  Denies nasal congestion or sore throat   Respiratory:  Denies cough or shortness of breath   Cardiovascular:  Denies chest pain or severe LE edema   GI:  Denies abdominal pain, nausea, vomiting, bloody stools or diarrhea   Musculoskeletal:  Denies numbness tingling or loss of motor function except as outlined above in history of present illness.  : Denies painful urination or hematuria  Integument:  Denies rash, lesion or ulceration   Neurologic:  Denies headache or focal weakness  Endocrine:  Denies lymphadenopathy  Psych:  Denies confusion or change in mental status   Hem:  Denies active bleeding    Physical Exam: 64 y.o. female  Body mass index is 20.5 kg/m².  There were no vitals filed for this visit.  Vital signs reviewed.   General Appearance:    Alert, cooperative, in no acute distress                  Eyes: conjunctivae clear  ENT: external ears and nose atraumatic  CV: no peripheral edema  Resp: normal respiratory effort  Skin: no rashes or wounds; normal turgor  Psych: mood and affect appropriate  Lymph: no nodes appreciated  Neuro: gross sensation intact  Vascular:  Palpable peripheral pulse in noted extremity  Musculoskeletal Extremities:   Right Knee-     Active Range of Motion- 0 to 135 degrees  4+/5 on flexion  4+/5 on extension  Maximal tenderness to palpation along the lateral joint line as  well as lateral patellar facet.  Lizz's exam- Moderately positive along the lateral joint line, no click.  Effusion- Moderate  Stable opening on varus and valgus stress at 0 and 30  Moderate valgus alignment.  Active patellar compression test- Positive    Debilities/Disabilities Identified: None      Diagnostic Tests:  Pre-Admission Testing on 02/27/2024   Component Date Value Ref Range Status    Color, UA 02/27/2024 Yellow  Yellow, Straw Final    Appearance, UA 02/27/2024 Clear  Clear Final    pH, UA 02/27/2024 6.0  4.5 - 8.0 Final    Specific Gravity, UA 02/27/2024 1.010  1.003 - 1.030 Final    Glucose, UA 02/27/2024 Negative  Negative Final    Ketones, UA 02/27/2024 Negative  Negative Final    Bilirubin, UA 02/27/2024 Negative  Negative Final    Blood, UA 02/27/2024 Negative  Negative Final    Protein, UA 02/27/2024 Negative  Negative Final    Leuk Esterase, UA 02/27/2024 Negative  Negative Final    Nitrite, UA 02/27/2024 Negative  Negative Final    Urobilinogen, UA 02/27/2024 0.2 E.U./dL  0.2 - 1.0 E.U./dL Final    PTT 02/27/2024 24.2 (L)  24.3 - 38.1 seconds Final    Protime 02/27/2024 12.0 (L)  12.1 - 15.0 Seconds Final    INR 02/27/2024 0.88 (L)  0.90 - 1.10 Final    Glucose 02/27/2024 104 (H)  65 - 99 mg/dL Final    BUN 02/27/2024 14  8 - 23 mg/dL Final    Creatinine 02/27/2024 0.68  0.57 - 1.00 mg/dL Final    Sodium 02/27/2024 139  136 - 145 mmol/L Final    Potassium 02/27/2024 3.8  3.5 - 5.2 mmol/L Final    Chloride 02/27/2024 101  98 - 107 mmol/L Final    CO2 02/27/2024 28.3  22.0 - 29.0 mmol/L Final    Calcium 02/27/2024 9.1  8.6 - 10.5 mg/dL Final    BUN/Creatinine Ratio 02/27/2024 20.6  7.0 - 25.0 Final    Anion Gap 02/27/2024 9.7  5.0 - 15.0 mmol/L Final    eGFR 02/27/2024 96.2  >60.0 mL/min/1.73 Final    MRSA Screen Cx 02/27/2024 No Methicillin Resistant Staphylococcus aureus isolated   Final    ABO Type 02/27/2024 A   Final    RH type 02/27/2024 Positive   Final    Antibody Screen 02/27/2024  Negative   Final    T&S Expiration Date 02/27/2024 3/12/2024 11:59:00 PM   Final    Hemoglobin A1C 02/27/2024 5.40  4.80 - 5.60 % Final    WBC 02/27/2024 5.27  3.40 - 10.80 10*3/mm3 Final    RBC 02/27/2024 4.23  3.77 - 5.28 10*6/mm3 Final    Hemoglobin 02/27/2024 13.9  12.0 - 15.9 g/dL Final    Hematocrit 02/27/2024 42.1  34.0 - 46.6 % Final    MCV 02/27/2024 99.5 (H)  79.0 - 97.0 fL Final    MCH 02/27/2024 32.9  26.6 - 33.0 pg Final    MCHC 02/27/2024 33.0  31.5 - 35.7 g/dL Final    RDW 02/27/2024 12.4  12.3 - 15.4 % Final    RDW-SD 02/27/2024 45.5  37.0 - 54.0 fl Final    MPV 02/27/2024 10.9  6.0 - 12.0 fL Final    Platelets 02/27/2024 277  140 - 450 10*3/mm3 Final    Neutrophil % 02/27/2024 57.8  42.7 - 76.0 % Final    Lymphocyte % 02/27/2024 30.6  19.6 - 45.3 % Final    Monocyte % 02/27/2024 8.2  5.0 - 12.0 % Final    Eosinophil % 02/27/2024 2.1  0.3 - 6.2 % Final    Basophil % 02/27/2024 0.9  0.0 - 1.5 % Final    Immature Grans % 02/27/2024 0.4  0.0 - 0.5 % Final    Neutrophils, Absolute 02/27/2024 3.05  1.70 - 7.00 10*3/mm3 Final    Lymphocytes, Absolute 02/27/2024 1.61  0.70 - 3.10 10*3/mm3 Final    Monocytes, Absolute 02/27/2024 0.43  0.10 - 0.90 10*3/mm3 Final    Eosinophils, Absolute 02/27/2024 0.11  0.00 - 0.40 10*3/mm3 Final    Basophils, Absolute 02/27/2024 0.05  0.00 - 0.20 10*3/mm3 Final    Immature Grans, Absolute 02/27/2024 0.02  0.00 - 0.05 10*3/mm3 Final    nRBC 02/27/2024 0.0  0.0 - 0.2 /100 WBC Final     No results found.    Right Knee X-Ray  Indication: Pain    AP, Lateral, and [place] views    Findings:  No fracture  No bony lesion  Normal soft tissues  Moderate tricompartmental DJD    Compared to prior office xrays      Assessment:  Right knee pain, DJD     Plan:  I reviewed anatomy of a total joint arthroplasty in laymen's terms, as well as typical postoperative recovery and possibly 6-12 months for maximal recovery, and possible need for rehabilitation stay after hospitalization. We also  discussed risks, benefits, alternatives, and limitations of procedure with risks including but not limited to neurovascular damage, bleeding, infection, malalignment, chronic pain, failure of implants, osteolysis, loosening of implants, loss of motion, weakness, stiffness, instability, DVT, pulmonary embolus, death, stroke, complex regional pain syndrome, myocardial infarction, and need for additional procedures. No guarantees were given regarding results of surgery.      Zoila Delgado was given the opportunity to ask and have all questions answered today.  The patient voiced understanding of the risks, benefits, and alternative forms of treatment that were discussed and the patient consents to proceed with surgery.     Patient's blood clot history is negative.    Plan for DVT prophylaxis is ASA    Patient's MRSA infection history is negative.    Patient's skin infection history is negative.    Discharge Plan: POD 0-1 to home    Date: 3/7/2024    Dictated utilizing Dragon dictation

## 2024-03-11 ENCOUNTER — ANESTHESIA EVENT (OUTPATIENT)
Dept: PERIOP | Facility: HOSPITAL | Age: 64
End: 2024-03-11
Payer: MEDICARE

## 2024-03-12 ENCOUNTER — APPOINTMENT (OUTPATIENT)
Dept: GENERAL RADIOLOGY | Facility: HOSPITAL | Age: 64
End: 2024-03-12
Payer: MEDICARE

## 2024-03-12 ENCOUNTER — HOSPITAL ENCOUNTER (OUTPATIENT)
Facility: HOSPITAL | Age: 64
Discharge: HOME | End: 2024-03-12
Attending: ORTHOPAEDIC SURGERY | Admitting: ORTHOPAEDIC SURGERY
Payer: MEDICARE

## 2024-03-12 ENCOUNTER — ANESTHESIA (OUTPATIENT)
Dept: PERIOP | Facility: HOSPITAL | Age: 64
End: 2024-03-12
Payer: MEDICARE

## 2024-03-12 VITALS
TEMPERATURE: 98.3 F | SYSTOLIC BLOOD PRESSURE: 136 MMHG | DIASTOLIC BLOOD PRESSURE: 82 MMHG | WEIGHT: 126.2 LBS | HEART RATE: 80 BPM | RESPIRATION RATE: 12 BRPM | BODY MASS INDEX: 20.37 KG/M2 | OXYGEN SATURATION: 100 %

## 2024-03-12 DIAGNOSIS — Z96.651 STATUS POST TOTAL RIGHT KNEE REPLACEMENT: Primary | ICD-10-CM

## 2024-03-12 DIAGNOSIS — M17.11 PRIMARY OSTEOARTHRITIS OF RIGHT KNEE: ICD-10-CM

## 2024-03-12 PROCEDURE — 25010000002 MIDAZOLAM PER 1MG: Performed by: NURSE ANESTHETIST, CERTIFIED REGISTERED

## 2024-03-12 PROCEDURE — 25010000002 PROPOFOL 1000 MG/100ML EMULSION: Performed by: NURSE ANESTHETIST, CERTIFIED REGISTERED

## 2024-03-12 PROCEDURE — C1776 JOINT DEVICE (IMPLANTABLE): HCPCS | Performed by: ORTHOPAEDIC SURGERY

## 2024-03-12 PROCEDURE — 25010000002 CEFAZOLIN PER 500 MG: Performed by: ORTHOPAEDIC SURGERY

## 2024-03-12 PROCEDURE — 25810000003 SODIUM CHLORIDE 0.9 % SOLUTION 250 ML FLEX CONT: Performed by: ORTHOPAEDIC SURGERY

## 2024-03-12 PROCEDURE — C1713 ANCHOR/SCREW BN/BN,TIS/BN: HCPCS | Performed by: ORTHOPAEDIC SURGERY

## 2024-03-12 PROCEDURE — 25010000002 BUPIVACAINE (PF) 0.5 % SOLUTION: Performed by: ANESTHESIOLOGY

## 2024-03-12 PROCEDURE — 97161 PT EVAL LOW COMPLEX 20 MIN: CPT

## 2024-03-12 PROCEDURE — 25010000002 VANCOMYCIN 1 G RECONSTITUTED SOLUTION 1 EACH VIAL: Performed by: ORTHOPAEDIC SURGERY

## 2024-03-12 PROCEDURE — 25010000002 ONDANSETRON PER 1 MG: Performed by: NURSE ANESTHETIST, CERTIFIED REGISTERED

## 2024-03-12 PROCEDURE — G0378 HOSPITAL OBSERVATION PER HR: HCPCS

## 2024-03-12 PROCEDURE — 25010000002 BUPIVACAINE 0.5 % SOLUTION: Performed by: NURSE ANESTHETIST, CERTIFIED REGISTERED

## 2024-03-12 PROCEDURE — L1830 KO IMMOB CANVAS LONG PRE OTS: HCPCS | Performed by: ORTHOPAEDIC SURGERY

## 2024-03-12 PROCEDURE — 25010000002 DEXAMETHASONE PER 1 MG: Performed by: NURSE ANESTHETIST, CERTIFIED REGISTERED

## 2024-03-12 PROCEDURE — 73560 X-RAY EXAM OF KNEE 1 OR 2: CPT

## 2024-03-12 PROCEDURE — 25810000003 SODIUM CHLORIDE 0.9 % SOLUTION: Performed by: ANESTHESIOLOGY

## 2024-03-12 PROCEDURE — 25010000002 BUPIVACAINE (PF) 0.25 % SOLUTION: Performed by: NURSE ANESTHETIST, CERTIFIED REGISTERED

## 2024-03-12 PROCEDURE — 25810000003 LACTATED RINGERS PER 1000 ML: Performed by: NURSE ANESTHETIST, CERTIFIED REGISTERED

## 2024-03-12 PROCEDURE — 25010000002 VANCOMYCIN 1 G RECONSTITUTED SOLUTION: Performed by: ORTHOPAEDIC SURGERY

## 2024-03-12 DEVICE — DEV WND/CLS CONTRL TISS STRATAFIX SYMM PDS PLS CTX 60CM VIL: Type: IMPLANTABLE DEVICE | Site: KNEE | Status: FUNCTIONAL

## 2024-03-12 DEVICE — CAP TOTL KN CMT PRIMARY: Type: IMPLANTABLE DEVICE | Site: KNEE | Status: FUNCTIONAL

## 2024-03-12 DEVICE — STEM TIB/KN PERSONA CMT 5D SZD RT: Type: IMPLANTABLE DEVICE | Site: KNEE | Status: FUNCTIONAL

## 2024-03-12 DEVICE — CMT BONE REFOBACIN R W/GENT 1X40: Type: IMPLANTABLE DEVICE | Site: KNEE | Status: FUNCTIONAL

## 2024-03-12 DEVICE — ART/SRF KN PERSONA/VE MC EF 6TO7 11MM RT: Type: IMPLANTABLE DEVICE | Site: KNEE | Status: FUNCTIONAL

## 2024-03-12 DEVICE — COMP FEM/KN PERSONA CR CMT NRW SZ7 RT: Type: IMPLANTABLE DEVICE | Site: KNEE | Status: FUNCTIONAL

## 2024-03-12 DEVICE — DEV CONTRL TISS STRATAFIX SPIRAL MNCRYL UD 3/0 PLS 45CM: Type: IMPLANTABLE DEVICE | Site: KNEE | Status: FUNCTIONAL

## 2024-03-12 DEVICE — PAT KN PERSONA VE CRS/LNK CMT 8.5X32MM: Type: IMPLANTABLE DEVICE | Site: KNEE | Status: FUNCTIONAL

## 2024-03-12 RX ORDER — FAMOTIDINE 10 MG/ML
20 INJECTION, SOLUTION INTRAVENOUS
Status: COMPLETED | OUTPATIENT
Start: 2024-03-12 | End: 2024-03-12

## 2024-03-12 RX ORDER — SODIUM CHLORIDE 0.9 % (FLUSH) 0.9 %
10 SYRINGE (ML) INJECTION AS NEEDED
Status: DISCONTINUED | OUTPATIENT
Start: 2024-03-12 | End: 2024-03-12 | Stop reason: HOSPADM

## 2024-03-12 RX ORDER — PREGABALIN 75 MG/1
150 CAPSULE ORAL ONCE
Status: COMPLETED | OUTPATIENT
Start: 2024-03-12 | End: 2024-03-12

## 2024-03-12 RX ORDER — ACETAMINOPHEN 500 MG
1000 TABLET ORAL ONCE
Status: COMPLETED | OUTPATIENT
Start: 2024-03-12 | End: 2024-03-12

## 2024-03-12 RX ORDER — DEXMEDETOMIDINE HYDROCHLORIDE 100 UG/ML
INJECTION, SOLUTION INTRAVENOUS
Status: COMPLETED | OUTPATIENT
Start: 2024-03-12 | End: 2024-03-12

## 2024-03-12 RX ORDER — NALOXONE HYDROCHLORIDE 4 MG/.1ML
SPRAY NASAL
Qty: 2 EACH | Refills: 0 | Status: SHIPPED | OUTPATIENT
Start: 2024-03-12

## 2024-03-12 RX ORDER — MIDAZOLAM HYDROCHLORIDE 2 MG/2ML
0.5 INJECTION, SOLUTION INTRAMUSCULAR; INTRAVENOUS
Status: COMPLETED | OUTPATIENT
Start: 2024-03-12 | End: 2024-03-12

## 2024-03-12 RX ORDER — VANCOMYCIN HYDROCHLORIDE 1 G/20ML
INJECTION, POWDER, LYOPHILIZED, FOR SOLUTION INTRAVENOUS AS NEEDED
Status: DISCONTINUED | OUTPATIENT
Start: 2024-03-12 | End: 2024-03-12 | Stop reason: HOSPADM

## 2024-03-12 RX ORDER — DOCUSATE SODIUM 100 MG/1
100 CAPSULE, LIQUID FILLED ORAL 2 TIMES DAILY PRN
Qty: 30 CAPSULE | Refills: 0 | Status: SHIPPED | OUTPATIENT
Start: 2024-03-12

## 2024-03-12 RX ORDER — MELOXICAM 7.5 MG/1
15 TABLET ORAL ONCE
Status: COMPLETED | OUTPATIENT
Start: 2024-03-12 | End: 2024-03-12

## 2024-03-12 RX ORDER — ONDANSETRON 4 MG/1
4 TABLET, FILM COATED ORAL EVERY 8 HOURS PRN
Qty: 30 TABLET | Refills: 0 | Status: SHIPPED | OUTPATIENT
Start: 2024-03-12

## 2024-03-12 RX ORDER — OXYCODONE HYDROCHLORIDE AND ACETAMINOPHEN 5; 325 MG/1; MG/1
1 TABLET ORAL ONCE AS NEEDED
Status: DISCONTINUED | OUTPATIENT
Start: 2024-03-12 | End: 2024-03-12 | Stop reason: HOSPADM

## 2024-03-12 RX ORDER — SENNOSIDES A AND B 8.6 MG/1
1 TABLET, FILM COATED ORAL NIGHTLY
Qty: 30 TABLET | Refills: 0 | Status: SHIPPED | OUTPATIENT
Start: 2024-03-12

## 2024-03-12 RX ORDER — SODIUM CHLORIDE, SODIUM LACTATE, POTASSIUM CHLORIDE, CALCIUM CHLORIDE 600; 310; 30; 20 MG/100ML; MG/100ML; MG/100ML; MG/100ML
100 INJECTION, SOLUTION INTRAVENOUS ONCE
Status: DISCONTINUED | OUTPATIENT
Start: 2024-03-12 | End: 2024-03-12 | Stop reason: HOSPADM

## 2024-03-12 RX ORDER — PROPOFOL 10 MG/ML
INJECTION, EMULSION INTRAVENOUS AS NEEDED
Status: DISCONTINUED | OUTPATIENT
Start: 2024-03-12 | End: 2024-03-12 | Stop reason: SURG

## 2024-03-12 RX ORDER — DEXAMETHASONE SODIUM PHOSPHATE 4 MG/ML
4 INJECTION, SOLUTION INTRA-ARTICULAR; INTRALESIONAL; INTRAMUSCULAR; INTRAVENOUS; SOFT TISSUE ONCE AS NEEDED
Status: COMPLETED | OUTPATIENT
Start: 2024-03-12 | End: 2024-03-12

## 2024-03-12 RX ORDER — TRANEXAMIC ACID 10 MG/ML
1000 INJECTION, SOLUTION INTRAVENOUS ONCE
Status: COMPLETED | OUTPATIENT
Start: 2024-03-12 | End: 2024-03-12

## 2024-03-12 RX ORDER — ONDANSETRON 2 MG/ML
4 INJECTION INTRAMUSCULAR; INTRAVENOUS ONCE AS NEEDED
Status: COMPLETED | OUTPATIENT
Start: 2024-03-12 | End: 2024-03-12

## 2024-03-12 RX ORDER — TRANEXAMIC ACID 10 MG/ML
1000 INJECTION, SOLUTION INTRAVENOUS ONCE
Status: DISCONTINUED | OUTPATIENT
Start: 2024-03-12 | End: 2024-03-12 | Stop reason: HOSPADM

## 2024-03-12 RX ORDER — SODIUM CHLORIDE 9 MG/ML
40 INJECTION, SOLUTION INTRAVENOUS AS NEEDED
Status: DISCONTINUED | OUTPATIENT
Start: 2024-03-12 | End: 2024-03-12 | Stop reason: HOSPADM

## 2024-03-12 RX ORDER — OXYCODONE HYDROCHLORIDE AND ACETAMINOPHEN 5; 325 MG/1; MG/1
1-2 TABLET ORAL EVERY 4 HOURS PRN
Qty: 42 TABLET | Refills: 0 | Status: SHIPPED | OUTPATIENT
Start: 2024-03-12

## 2024-03-12 RX ORDER — BUPIVACAINE HYDROCHLORIDE 5 MG/ML
INJECTION, SOLUTION PERINEURAL
Status: COMPLETED | OUTPATIENT
Start: 2024-03-12 | End: 2024-03-12

## 2024-03-12 RX ORDER — ASPIRIN 81 MG/1
81 TABLET ORAL 2 TIMES DAILY
Qty: 60 TABLET | Refills: 0 | Status: SHIPPED | OUTPATIENT
Start: 2024-03-12

## 2024-03-12 RX ORDER — LIDOCAINE HYDROCHLORIDE 20 MG/ML
INJECTION, SOLUTION INFILTRATION; PERINEURAL AS NEEDED
Status: DISCONTINUED | OUTPATIENT
Start: 2024-03-12 | End: 2024-03-12 | Stop reason: SURG

## 2024-03-12 RX ORDER — SODIUM CHLORIDE, SODIUM LACTATE, POTASSIUM CHLORIDE, CALCIUM CHLORIDE 600; 310; 30; 20 MG/100ML; MG/100ML; MG/100ML; MG/100ML
9 INJECTION, SOLUTION INTRAVENOUS CONTINUOUS PRN
Status: DISCONTINUED | OUTPATIENT
Start: 2024-03-12 | End: 2024-03-12 | Stop reason: HOSPADM

## 2024-03-12 RX ORDER — MAGNESIUM HYDROXIDE 1200 MG/15ML
LIQUID ORAL AS NEEDED
Status: DISCONTINUED | OUTPATIENT
Start: 2024-03-12 | End: 2024-03-12 | Stop reason: HOSPADM

## 2024-03-12 RX ORDER — BUPIVACAINE HYDROCHLORIDE 2.5 MG/ML
INJECTION, SOLUTION EPIDURAL; INFILTRATION; INTRACAUDAL
Status: COMPLETED | OUTPATIENT
Start: 2024-03-12 | End: 2024-03-12

## 2024-03-12 RX ORDER — DOXYCYCLINE HYCLATE 100 MG/1
100 CAPSULE ORAL 2 TIMES DAILY
Qty: 20 CAPSULE | Refills: 0 | Status: SHIPPED | OUTPATIENT
Start: 2024-03-12 | End: 2024-03-22

## 2024-03-12 RX ORDER — PREGABALIN 75 MG/1
75 CAPSULE ORAL 2 TIMES DAILY
Qty: 60 CAPSULE | Refills: 0 | Status: SHIPPED | OUTPATIENT
Start: 2024-03-12

## 2024-03-12 RX ORDER — SODIUM CHLORIDE 0.9 % (FLUSH) 0.9 %
10 SYRINGE (ML) INJECTION EVERY 12 HOURS SCHEDULED
Status: DISCONTINUED | OUTPATIENT
Start: 2024-03-12 | End: 2024-03-12 | Stop reason: HOSPADM

## 2024-03-12 RX ADMIN — BUPIVACAINE HYDROCHLORIDE 1.8 ML: 5 INJECTION, SOLUTION PERINEURAL at 07:38

## 2024-03-12 RX ADMIN — MELOXICAM 15 MG: 7.5 TABLET ORAL at 06:30

## 2024-03-12 RX ADMIN — VANCOMYCIN HYDROCHLORIDE 1000 MG: 1 INJECTION, POWDER, LYOPHILIZED, FOR SOLUTION INTRAVENOUS at 06:39

## 2024-03-12 RX ADMIN — MIDAZOLAM HYDROCHLORIDE 0.5 MG: 1 INJECTION, SOLUTION INTRAMUSCULAR; INTRAVENOUS at 07:32

## 2024-03-12 RX ADMIN — TRANEXAMIC ACID 1000 MG: 10 INJECTION, SOLUTION INTRAVENOUS at 08:08

## 2024-03-12 RX ADMIN — SODIUM CHLORIDE, POTASSIUM CHLORIDE, SODIUM LACTATE AND CALCIUM CHLORIDE 9 ML/HR: 600; 310; 30; 20 INJECTION, SOLUTION INTRAVENOUS at 06:19

## 2024-03-12 RX ADMIN — CEFAZOLIN 2000 MG: 2 INJECTION, POWDER, FOR SOLUTION INTRAVENOUS at 07:58

## 2024-03-12 RX ADMIN — ACETAMINOPHEN 1000 MG: 500 TABLET ORAL at 06:30

## 2024-03-12 RX ADMIN — ONDANSETRON 4 MG: 2 INJECTION INTRAMUSCULAR; INTRAVENOUS at 10:48

## 2024-03-12 RX ADMIN — LIDOCAINE HYDROCHLORIDE 60 MG: 20 INJECTION, SOLUTION INFILTRATION; PERINEURAL at 07:53

## 2024-03-12 RX ADMIN — BUPIVACAINE HYDROCHLORIDE 8 ML/HR: 5 INJECTION, SOLUTION EPIDURAL; INTRACAUDAL; PERINEURAL at 11:14

## 2024-03-12 RX ADMIN — DEXAMETHASONE SODIUM PHOSPHATE 4 MG: 4 INJECTION, SOLUTION INTRAMUSCULAR; INTRAVENOUS at 06:30

## 2024-03-12 RX ADMIN — PREGABALIN 150 MG: 75 CAPSULE ORAL at 06:30

## 2024-03-12 RX ADMIN — DEXMEDETOMIDINE 15 MCG: 100 INJECTION, SOLUTION, CONCENTRATE INTRAVENOUS at 07:10

## 2024-03-12 RX ADMIN — BUPIVACAINE HYDROCHLORIDE 10 ML: 2.5 INJECTION, SOLUTION EPIDURAL; INFILTRATION; INTRACAUDAL at 07:27

## 2024-03-12 RX ADMIN — PROPOFOL INJECTABLE EMULSION 50 MG: 10 INJECTION, EMULSION INTRAVENOUS at 08:29

## 2024-03-12 RX ADMIN — PROPOFOL INJECTABLE EMULSION 100 MCG/KG/MIN: 10 INJECTION, EMULSION INTRAVENOUS at 07:54

## 2024-03-12 RX ADMIN — TRANEXAMIC ACID 1000 MG: 10 INJECTION, SOLUTION INTRAVENOUS at 09:47

## 2024-03-12 RX ADMIN — DEXMEDETOMIDINE HYDROCHLORIDE 15 MCG: 100 INJECTION, SOLUTION INTRAVENOUS at 07:24

## 2024-03-12 RX ADMIN — BUPIVACAINE HYDROCHLORIDE 20 ML: 2.5 INJECTION, SOLUTION EPIDURAL; INFILTRATION; INTRACAUDAL at 07:24

## 2024-03-12 RX ADMIN — PROPOFOL INJECTABLE EMULSION 70 MG: 10 INJECTION, EMULSION INTRAVENOUS at 07:53

## 2024-03-12 RX ADMIN — MIDAZOLAM HYDROCHLORIDE 0.5 MG: 1 INJECTION, SOLUTION INTRAMUSCULAR; INTRAVENOUS at 06:53

## 2024-03-12 RX ADMIN — BUPIVACAINE HYDROCHLORIDE 20 ML: 2.5 INJECTION, SOLUTION EPIDURAL; INFILTRATION; INTRACAUDAL; PERINEURAL at 07:10

## 2024-03-12 RX ADMIN — FAMOTIDINE 20 MG: 10 INJECTION, SOLUTION INTRAVENOUS at 06:30

## 2024-03-12 RX ADMIN — ONDANSETRON 4 MG: 2 INJECTION INTRAMUSCULAR; INTRAVENOUS at 06:30

## 2024-03-12 NOTE — INTERVAL H&P NOTE
H&P reviewed. The patient was examined and there are no changes to the H&P.    Vitals:    03/12/24 0554 03/12/24 0600 03/12/24 0617 03/12/24 0651   BP:  143/91  142/86   Pulse:  63 66 68   Resp:  20  13   Temp: 98 °F (36.7 °C)      TempSrc: Oral      SpO2:  100% 100% 97%   Weight: 57.2 kg (126 lb 3.2 oz)

## 2024-03-12 NOTE — ANESTHESIA PREPROCEDURE EVALUATION
Anesthesia Evaluation     Patient summary reviewed and Nursing notes reviewed   history of anesthetic complications:  PONV  NPO Solid Status: > 8 hours  NPO Liquid Status: > 8 hours           Airway   Mallampati: II  TM distance: >3 FB  Neck ROM: full  No difficulty expected  Dental - normal exam     Pulmonary    (-) sleep apnea, rhonchi, decreased breath sounds, wheezes, not a smoker  Cardiovascular - normal exam  Exercise tolerance: good (4-7 METS)    ECG reviewed  Rhythm: regular  Rate: normal    (+) hyperlipidemia  (-) hypertension, CAD, angina, SEGURA, murmur    ROS comment: Hx of palpitations, cardiac evaluation on 2/1/24    Date/Time: 2/1/2024 9:58 AM  Comparison: compared with previous ECG from 11/1/2023  Similar to previous ECG  Rhythm: sinus rhythm  Rate: normal  BPM: 74  Conduction: conduction normal  ST Segments: ST segments normal  T Waves: T waves normal  QRS axis: normal   Clinical impression: normal ECG    Stress echo 11/2023: Normal LVEF, mild mitral regurgitation, she reported chest pressure 2/10, occasional PACs/PVCs during stress, and no EKG/echo evidence of ischemia.      Neuro/Psych  (-) seizures, CVA    ROS Comment: Tingling bilateral toes  GI/Hepatic/Renal/Endo    (+) GERD well controlled  (-)  obesity, no renal disease, diabetes    ROS Comment: Barretts esophagus    Musculoskeletal     Abdominal  - normal exam    Abdomen: soft.   Substance History   (+) alcohol use      Comment: occasionally   OB/GYN negative ob/gyn ROS         Other   arthritis,                   Anesthesia Plan    ASA 2     MAC, regional and spinal   total IV anesthesia  intravenous induction     Anesthetic plan, risks, benefits, and alternatives have been provided, discussed and informed consent has been obtained with: patient.    Use of blood products discussed with patient  Consented to blood products.    Plan discussed with CRNA.

## 2024-03-12 NOTE — NURSING NOTE
ASSISTED PATIENT TO DRESS- STOOD UP AT BEDSIDE- SHE WAS LIGHT HEADED-  NOW LAYING BACK DOWN - WILL AWAIT TO CALL PT

## 2024-03-12 NOTE — ANESTHESIA PROCEDURE NOTES
Peripheral Block    Pre-sedation assessment completed: 3/12/2024 6:52 AM    Patient reassessed immediately prior to procedure    Patient location during procedure: pre-op  Start time: 3/12/2024 7:06 AM  Stop time: 3/12/2024 7:10 AM  Reason for block: at surgeon's request and post-op pain management  Performed by  CRNA/CAA: Inez Lees, BANDARSRNA: Tarah Mathew SRNA  Preanesthetic Checklist  Completed: patient identified, IV checked, site marked, risks and benefits discussed, surgical consent, monitors and equipment checked, pre-op evaluation and timeout performed  Prep:  Pt Position: supine  Sterile barriers:cap, gloves, mask and washed/disinfected hands  Prep: ChloraPrep  Patient monitoring: blood pressure monitoring, continuous pulse oximetry and EKG  Procedure    Sedation: yes  Performed under: local infiltration  Guidance:ultrasound guided    ULTRASOUND INTERPRETATION.  Using ultrasound guidance a 21 G gauge needle was placed in close proximity to the nerve, at which point, under ultrasound guidance anesthetic was injected in the area of the nerve and spread of the anesthesia was seen on ultrasound in close proximity thereto.  There were no abnormalities seen on ultrasound; a digital image was taken; and the patient tolerated the procedure with no complications. Images:still images obtained, printed/placed on chart    Laterality:right  Block Type:adductor canal block  Injection Technique:single-shot  Needle Type:echogenic  Needle Gauge:21 G  Resistance on Injection: none    Medications Used: dexmedetomidine HCl (PRECEDEX) injection - Intravenous   15 mcg - 3/12/2024 7:10:00 AM  bupivacaine PF (MARCAINE) injection 0.25% - Injection   20 mL - 3/12/2024 7:10:00 AM      Post Assessment  Injection Assessment: negative aspiration for heme, no paresthesia on injection and incremental injection  Patient Tolerance:comfortable throughout block  Complications:no  Additional Notes  15 mcg precedex in  block

## 2024-03-12 NOTE — ANESTHESIA PROCEDURE NOTES
Peripheral Block    Pre-sedation assessment completed: 3/12/2024 6:52 AM    Patient reassessed immediately prior to procedure    Patient location during procedure: pre-op  Start time: 3/12/2024 7:26 AM  Stop time: 3/12/2024 7:27 AM  Reason for block: at surgeon's request and post-op pain management  Performed by  CRNA/CAA: Inez Lees, BANDARSRNA: Tarah Mathew SRNA  Preanesthetic Checklist  Completed: patient identified, IV checked, site marked, risks and benefits discussed, surgical consent, monitors and equipment checked, pre-op evaluation and timeout performed  Prep:  Pt Position: supine  Sterile barriers:cap, gloves, mask and washed/disinfected hands  Prep: ChloraPrep  Patient monitoring: blood pressure monitoring, continuous pulse oximetry and EKG  Procedure    Sedation: yes  Performed under: local infiltration  Guidance:ultrasound guided    ULTRASOUND INTERPRETATION.  Using ultrasound guidance a 21 G gauge needle was placed in close proximity to the nerve, at which point, under ultrasound guidance anesthetic was injected in the area of the nerve and spread of the anesthesia was seen on ultrasound in close proximity thereto.  There were no abnormalities seen on ultrasound; a digital image was taken; and the patient tolerated the procedure with no complications. Images:still images obtained, printed/placed on chart    Laterality:right  Block Type:GALINDO  Injection Technique:single-shot  Needle Type:echogenic  Needle Gauge:21 G  Resistance on Injection: none    Medications Used: bupivacaine PF (MARCAINE) injection 0.25% - Injection   10 mL - 3/12/2024 7:27:00 AM      Post Assessment  Injection Assessment: negative aspiration for heme, no paresthesia on injection and incremental injection  Patient Tolerance:comfortable throughout block  Complications:no

## 2024-03-12 NOTE — OP NOTE
Date of Surgery: 3/12/2024    Preoperative diagnosis: right knee osteoarthritis    Postoperative diagnosis: right knee osteoarthritis    Procedure:  1.right total knee arthroplasty  2. Intraoperative use of kinetic knee balance sensor for implant stability during total knee arthroplasty    Surgical Approach: Knee Medial Parapatellar         Surgeon: Joselito Paula.: Assistant: Mena Mendoza CSA was responsible for performing the following activities: Retraction, Irrigation, Closing, and Placing Dressing and their skilled assistance was necessary for the success of this case.      spinal with regional block    Estimated blood loss: <500ml    Fluids: per anesthesia    Specimens: None    Complications: None    Drains: None    Tourniquet time: Less than 2 hours at 250 mmHg    Implants used: Daniel Persona total knee arthroplasty system with a size 32 patella, size D tibia, size 7 narrow cruciate retaining femoral component , 11 mm highly cross-linked medial congruent polyethylene insert, antibiotic cement    Examination under anesthesia: right knee passive range of motion 0-130°, valgus alignment, no open wounds lacerations or abrasions over knee, stable to varus and valgus stress at 0 and 30°, 2+ dorsalis pedis pulse.    Indications for procedure: Patient is a pleasant 64 y.o. female who has had significant pain to right knee over the last several years, has failed conservative treatment with intra-articular injections, bracing, home exercise program, activity modification, anti-inflammatory medications. Has had persistent pain limiting activities of daily living and even has had pain at rest. We discussed treatment options and patient wished to proceed with above-mentioned surgery. Was explained details of procedure as well as risks benefits and alternatives as documented on history and physical and had all questions answered. No guarantees were given in regards to results of the surgery.    Details of procedure:  Patient was seen, evaluated, and cleared for surgery by anesthesia. Had been medically optimized by primary care physician. Patient was met in the preoperative hold area, operative site was marked, consent was reviewed, history and physical was updated, and preoperative labs were reviewed. Regional block and spinal were placed per anesthesia and patient was taken to the operating room and placed in supine position on a regular OR table. Examination under anesthesia was carried out this time. Nonsterile tourniquet was then applied to right lower extremity. Patient was secured to the table with a waist strap and all bony prominences were well-padded. right lower extremity was then sterilely prepped and draped in standard fashion.  Formal timeout was completed including confirmation of history and physical, operative consent, operative site, patient identification number, and preoperative antibiotics administration. right leg was then exsanguinated and tourniquet inflated to 250 mmHg. Procedure was then begun with longitudinal incision over the anterior aspect of the right knee through skin and subcutaneous tissue with 15 blade. Minimal subcutaneous flaps were developed at this point in time, and standard medial parapatellar arthrotomy was then created at this point. Portion of suprapatellar and infrapatellar fat pad were resected this point in time. Medial tibial peel was carried out in order to allow for further exposure the knee. Medial and lateral meniscus were resected this time and ACL was transected.  Patella was then everted and measured with a caliper. Patellar reamer was then used to create initial retropatellar cut followed by completion of cut with a oscillating saw in standard fashion and use of rongeur. Patella was sized as a 32 and patella guard was placed at this time.  Attention was then turned to the distal femur with the knee being brought into a flexed position.  Reference pin for [employer]-Assist  navigational system was placed in the distal femur in-line with Whitesides line in retrograde fashion.  Navigational distal femoral cutting block was positioned at this time as well and calibrated with multiple planes of knee and hip motion carried out to set reference positions for navigation device.  Navigational device was then adjusted to 2 degrees valgus cut on distal femur and 4 degrees flexion cut on distal femur.  Distal femoral cutting block was pinned into position at this site, navigational device removed. Depth of the resection was checked with a sickle and noted to be appropriate. Additional pin was placed for security of the cutting block and oscillating saw was then used to create a distal femoral cut in standard fashion while collateral ligaments were protected with Z retractors. Bone was removed and measured at this time.  Cut validation was completed with navigational device at this point time as well confirming appropriate cut consistent with set parameters as noted above. Pins and cutting block were removed as well. Contents of the notch were then resected including the ACL, PCL, and posterior horns of medial and lateral meniscus. Posterior retractor was then placed and tibia was subluxated anteriorly.   Attention was then turned to the proximal tibia. Additional release of lateral tissues was completed at this time to allow for appropriate visualization of the proximal tibia articular surface. I-Assist navigational device was then pinned in position over the tibial tubercle with 3 pins at this time, external guide was placed in line with the tibial crest and center of the ankle joint and clamped into position over the ankle while proximal guide pins were impacted into the top of the tibia.  Navigational pods were then calibrated with range of motion of the hip and once noted to be appropriate, external guide from tibia was removed.  Adjustments were made to the navigational device to place the  cut in neutral varus and 4 degrees posterior slope.  Tibial cutting block resection depth was set with a sickle, block was pinned into position at this time, and alignment assessed with the drop jamie noted to be in line with tibial crest, medial third of tibial tubercle, and center of the ankle joint.  Oscillating saw was then used to complete the proximal tibial cut while collateral ligaments were protected with Z retractors. Bone fragments were removed and measured.  Tibial cut was then validated with validation device from navigational system and confirmed to be within reasonable position of above-mentioned set parameters for the proximal tibia cut. Knee was brought into full extension with extension gap being checked with spacer block at this time and noted be acceptable with knee brought into full extension, stable medial and lateral collateral stability at full extension.  Navigational guide was then removed at this point time.   Attention was then returned to the distal femur with a femoral sizing guide being placed, transverse epicondylar axis and Whitesides line being assessed and used to determine appropriate external rotation of 5 degrees.  Femoral sizing guide was secured to the distal femur with 2 screws, sizing caliper was adjusted to the anterior femur and femur was sized at a 7.  2 drill holes were made through the sizing guide which was then removed including 2 screws. Size 7 femoral cutting block was then impacted onto the distal femoral cut surface and pinned into position. Sickle was used to check the anterior cut and there was no evidence of anticipated notching. Collateral ligaments were protected with Z retractors while anterior, posterior, and chamfer cuts were created in standard fashion with oscillating saw. Femoral cutting block was removed at this time as well as bone fragments. Posterior capsule was stripped at this time. Size 7 femoral trial was placed. Size D tibial baseplate trial with  11 mm polyethylene trial was inserted. Knee was then ranged from 0-140°, good varus and valgus stability at full extension and 30° as well as throughout mid flexion with good AP translation at 90° of flexion noted.  Patella was everted at this point in time, 32 mm patella reaming guide was placed and lug holes were drilled for patella at this point. Patella trial was placed and patella was noted to track in midline with no evidence of subluxation. Knee was ranged from full extension to full flexion and tibial rotation was noted with midline of tibial trial being marked with Bovie electrocautery at the proximal tibia. Femoral and patellar and tibial trials were removed at this point in time and tibia was subluxated anteriorly with posterior retractor. Tibial trial baseplate was placed once again, position confirmed with drop jamie as well as with previous marking for tibial rotation and assessing for bony coverage of implant. Once position of tibial baseplate was noted to be appropriate, 2 pins were placed at this time, and reamer and punch were then used through the tibial baseplate.  All trial components were once again removed at this time and pulsatile lavage was used to thoroughly clean all bony cut surfaces as well as subcutaneous tissue. Final implants were opened on the back table this time. Cement was prepared on the back table and was applied to the cut surfaces of the distal femur, proximal tibia, and patella as well as to the backside of the implants. Tibial component was placed first followed by distal femur followed by patella. Extraneous cement was removed with freer at this time. Once all implants were in position knee was brought into full extension with axial compression to allow for cement to cure fully.  Once cement was completely hardened, trial polyethylene insert was assessed, range of motion of knee from 0-140° was achieved with good varus and valgus stability throughout range of motion and good  AP translation at 90° of flexion. Thus a 11 mm polyethylene insert was opened on the back table, inserted and locked in appropriately into the tibial baseplate. Knee was thoroughly irrigated with a second evaluation for any additional bone fragments or cement particles. Knee was then thoroughly irrigated with Betadine solution followed by pulsatile lavage. 1 g of vancomycin powder was added to deep and subcutaneous tissue at this time.  Attention was then turned to closure of the wound with running self locking #2 suture ×1, 2-0 Vicryls in inverted fashion for deep subcutaneous closure, 3-0 running self locking strata-fix suture, and glue and mesh for skin. Wound was dressed with Telfa, 4 x 4 gauze, web roll, ABD pad, Ace wrap, and patient was placed in knee immobilizer and given ice pack. At the end of procedure all lap, needle and sponge counts were correct ×2. Patient had brisk cap refill all digits right lower extremity, compartments are soft and easily compressible at the end of the procedure.    Disposition: Patient was taken to recovery room in stable condition. Will be discharged home pending appropriate pain control and initiation of therapy, weight-bear as tolerated right lower extremity, DVT prophylaxis will be started on postoperative day #1 with aspirin. Results of the procedure were discussed immediately postoperatively with patient's family and they had all questions answered at that time.

## 2024-03-12 NOTE — THERAPY DISCHARGE NOTE
Patient Name: Zoila Delgado  : 1960    MRN: [id number]                              Today's Date: 3/12/2024       Admit Date: 3/12/2024    Visit Dx:     ICD-10-CM ICD-9-CM   1. Status post total right knee replacement  Z96.651 V43.65   2. Primary osteoarthritis of right knee  M17.11 715.16     Patient Active Problem List   Diagnosis    Patellofemoral pain syndrome    Knee crepitus    Chondromalacia, knee    Gastroesophageal reflux disease without esophagitis    Monsivais's esophagus without dysplasia    Primary osteoarthritis of right knee    Constipation    Gastroparesis    DDD (degenerative disc disease), lumbar    Trochanteric bursitis of left hip    Lumbar disc disease with radiculopathy    S/P total knee arthroplasty     Past Medical History:   Diagnosis Date    Ankle sprain     Arthritis     Arthritis of back     Dr. Owen    Monsivais esophagus     Cervical disc disorder     Diverticular disease     Diverticulitis     Dyspepsia     Gastritis     GERD (gastroesophageal reflux disease)     HH (hiatus hernia)     Hip arthrosis 2022    Dr Yee did Mri    Hyperlipidemia     Knee swelling Several years ago    Dr. Yee    Low back pain     Lumbar disc disease with radiculopathy 2023    Palpitations     Perirectal abscess     PONV (postoperative nausea and vomiting)     after ablation    Renal cyst     left    Rotator cuff syndrome     Severe motion sickness     Shoulder injury     Trochanteric bursitis of left hip 2023     Past Surgical History:   Procedure Laterality Date    COLONOSCOPY  2015    tics,nibh    ENDOMETRIAL ABLATION      x2    ENDOSCOPY  2015    HH, Z-line irregular, 42 cm. fromincisors, chronic inflammation    ENDOSCOPY N/A 2019    Monsivais's, HH    TONSILLECTOMY  1968      General Information       Row Name 24 9654          Physical Therapy Time and Intention    Document Type discharge evaluation/summary  -JW     Mode of Treatment physical  therapy  -       Row Name 03/12/24 1335          General Information    Patient Profile Reviewed yes  pt s/p right TKA, WBAT  -     Prior Level of Function independent:;all household mobility;community mobility  -     Existing Precautions/Restrictions fall;brace worn when out of bed  -     Barriers to Rehab none identified  -       Row Name 03/12/24 1335          Living Environment    People in Home other (see comments)  pt lives with family  -       Row Name 03/12/24 1335          Home Main Entrance    Number of Stairs, Main Entrance three  -JW     Stair Railings, Main Entrance none  -       Row Name 03/12/24 1335          Stairs Within Home, Primary    Stairs, Within Home, Primary single story house  -       Row Name 03/12/24 1335          Cognition    Orientation Status (Cognition) oriented x 4  -               User Key  (r) = Recorded By, (t) = Taken By, (c) = Cosigned By      Initials Name Provider Type     Floridalma Hunter, PT Physical Therapist                   Mobility       Row Name 03/12/24 1335          Bed Mobility    Comment, (Bed Mobility) pt sitting on EOB upon arrival  -       Row Name 03/12/24 1335          Transfers    Comment, (Transfers) cues for hand placement  -       Row Name 03/12/24 1336          Sit-Stand Transfer    Sit-Stand Ness (Transfers) contact guard;verbal cues  -     Assistive Device (Sit-Stand Transfers) walker, front-wheeled  -     Comment, (Sit-Stand Transfer) cues for hand placement  -       Row Name 03/12/24 1336          Gait/Stairs (Locomotion)    Ness Level (Gait) contact guard  -     Assistive Device (Gait) walker, front-wheeled  -     Patient was able to Ambulate yes  -     Distance in Feet (Gait) 125  -     Pattern (Gait) swing-through  -     Deviations/Abnormal Patterns (Gait) base of support, narrow  -     Bilateral Gait Deviations forward flexed posture  -     Gait Assessment/Intervention pt manages direction  changes without difficulty or balance loss  -     Stairs Assessment/Intervention reviewed stair training method with patient and family  -[facility] Name 03/12/24 8138          Mobility    Extremity Weight-bearing Status right lower extremity  -     Right Lower Extremity (Weight-bearing Status) weight-bearing as tolerated (WBAT)  -               User Key  (r) = Recorded By, (t) = Taken By, (c) = Cosigned By      Initials Name Provider Type     Floridalma Hunter, PT Physical Therapist                   Obj/Interventions       [facility] Name 03/12/24 1334          Range of Motion Comprehensive    Comment, General Range of Motion left LE ROM WFL bilaterally, right LE not formally tested  -[facility] Name 03/12/24 1332          Strength Comprehensive (MMT)    Comment, General Manual Muscle Testing (MMT) Assessment left LE strength 4+/5, right LE not formally tested  -[facility] Name 03/12/24 1334          Motor Skills    Therapeutic Exercise --  education provided on LE HEP  -[facility] Name 03/12/24 1331          Balance    Comment, Balance independent with static sitting balance, CGA for standing balance with walker  -               User Key  (r) = Recorded By, (t) = Taken By, (c) = Cosigned By      Initials Name Provider Type     Floridalma Hunter, PT Physical Therapist                   Goals/Plan    No documentation.                  Clinical Impression       [facility] Name 03/12/24 3926          Pain    Pretreatment Pain Rating 0/10 - no pain  -     Posttreatment Pain Rating --  pt reports increased discomfort with mobility, does not rate pain  -     Pain Location incisional  -     Pain Location - knee  -     Pain Intervention(s) Repositioned;Ambulation/increased activity  -[facility] Name 03/12/24 8721          Plan of Care Review    Plan of Care Reviewed With patient;family  -     Outcome Evaluation Physical therapy evaluation complete.  Patient performs sit to/from stand with CGA and gait x125  "feet with rolling walker, CGA with knee immobilizer in place.  Patient with no balance loss and manages direction changes without difficulty.  Patient educated on LE HEP and reviewed method for ascending/descending steps.  Patient denies DME needs, anticipate outpatient PT services at discharge.  Patient plans to return home from PACU today, no further PT needs in acute care setting.  -       Row Name 03/12/24 0607          Therapy Assessment/Plan (PT)    Patient/Family Therapy Goals Statement (PT) \"go home\"  -     Criteria for Skilled Interventions Met (PT) no problems identified which require skilled intervention  -     Therapy Frequency (PT) evaluation only  -       Row Name 03/12/24 0592          Positioning and Restraints    Pre-Treatment Position in bed  -     Post Treatment Position bed  -JW     In Bed sitting EOB;call light within reach;encouraged to call for assist;notified nsg;with family/caregiver  -               User Key  (r) = Recorded By, (t) = Taken By, (c) = Cosigned By      Initials Name Provider Type    Flroidalma Burch, PT Physical Therapist                   Outcome Measures       Row Name 03/12/24 1888          How much help from another person do you currently need...    Turning from your back to your side while in flat bed without using bedrails? 4  -JW     Moving from lying on back to sitting on the side of a flat bed without bedrails? 3  -JW     Moving to and from a bed to a chair (including a wheelchair)? 3  -JW     Standing up from a chair using your arms (e.g., wheelchair, bedside chair)? 3  -JW     Climbing 3-5 steps with a railing? 3  -JW     To walk in hospital room? 3  -JW     AM-PAC 6 Clicks Score (PT) 19  -JW     Highest Level of Mobility Goal 6 --> Walk 10 steps or more  -       Row Name 03/12/24 6281          Functional Assessment    Outcome Measure Options AM-PAC 6 Clicks Basic Mobility (PT)  -               User Key  (r) = Recorded By, (t) = Taken By, (c) = " Cosigned By      Initials Name Provider Type     Floridalma Hunter, [place] Physical Therapist                  Physical Therapy Education       Title: PT OT SLP Therapies (Resolved)       Topic: Physical Therapy (Resolved)       Point: Mobility training (Resolved)       Learning Progress Summary             Patient Acceptance, E,TB, VU by TAMMY at 3/12/2024 1406                         Point: Home exercise program (Resolved)       Learning Progress Summary             Patient Acceptance, E,TB, VU by TAMMY at 3/12/2024 1407                                         User Key       Initials Effective Dates Name Provider Type [facility] 06/16/21 -  Floridalma Hunter, [place] Physical Therapist PT                  PT Recommendation and Plan     Plan of Care Reviewed With: patient, family  Outcome Evaluation: Physical therapy evaluation complete.  Patient performs sit to/from stand with CGA and gait x125 feet with rolling walker, CGA with knee immobilizer in place.  Patient with no balance loss and manages direction changes without difficulty.  Patient educated on LE HEP and reviewed method for ascending/descending steps.  Patient denies DME needs, anticipate outpatient PT services at discharge.  Patient plans to return home from PACU today, no further PT needs in acute care setting.     Time Calculation:   PT Evaluation Complexity  History, PT Evaluation Complexity: no personal factors and/or comorbidities  Examination of Body Systems (PT Eval Complexity): 1-2 elements  Clinical Presentation (PT Evaluation Complexity): stable  Clinical Decision Making (PT Evaluation Complexity): low complexity  Overall Complexity (PT Evaluation Complexity): low complexity     PT Charges       Row Name 03/12/24 1408             Time Calculation    Start Time 1335  -      Stop Time 1351  -      Time Calculation (min) 16 min  -      PT Received On 03/12/24  -                User Key  (r) = Recorded By, (t) = Taken By, (c) = Cosigned By       Initials Name Provider Type    Floridalma Burch, PT Physical Therapist                  Therapy Charges for Today       Code Description Service Date Service Provider Modifiers Qty    21656998192 HC PT EVAL LOW COMPLEXITY 1 3/12/2024 Floridalma Hutner, PT GP 1            PT G-Codes  Outcome Measure Options: AM-PAC 6 Clicks Basic Mobility (PT)  AM-PAC 6 Clicks Score (PT): 19    PT Discharge Summary  Anticipated Discharge Disposition (PT): home with outpatient therapy services    Floridalma Hunter, [place]  3/12/2024

## 2024-03-12 NOTE — ANESTHESIA PROCEDURE NOTES
Spinal Block    Pre-sedation assessment completed: 3/12/2024 6:52 AM    Patient reassessed immediately prior to procedure    Patient location during procedure: pre-op  Start Time: 3/12/2024 7:38 AM  Stop Time: 3/12/2024 7:42 AM  Indication:at surgeon's request and post-op pain management  Performed By  CRNA/CAA: Inez Lees CRNASRNA: Tarah Mathew SRNA  Preanesthetic Checklist  Completed: patient identified, IV checked, site marked, risks and benefits discussed, surgical consent, monitors and equipment checked, pre-op evaluation and timeout performed  Spinal Block Prep:  Patient Position:sitting  Sterile Tech:cap, gloves, mask and sterile barriers  Prep:Chloraprep  Patient Monitoring:blood pressure monitoring, continuous pulse oximetry and EKG    Spinal Block Procedure  Approach:midline  Guidance:landmark technique and palpation technique  Location:L3-L4  Needle Type:Sprotte  Needle Gauge:25 G  Placement of Spinal needle event:cerebrospinal fluid aspirated  Paresthesia: no  Fluid Appearance:clear  Medications: bupivacaine (MARCAINE) injection 0.5% - Injection   1.8 mL - 3/12/2024 7:38:00 AM   Post Assessment  Patient Tolerance:patient tolerated the procedure well with no apparent complications  Complications no

## 2024-03-12 NOTE — ANESTHESIA PROCEDURE NOTES
Peripheral Block    Pre-sedation assessment completed: 3/12/2024 6:52 AM    Patient reassessed immediately prior to procedure    Patient location during procedure: pre-op  Start time: 3/12/2024 7:21 AM  Stop time: 3/12/2024 7:24 AM  Reason for block: at surgeon's request and post-op pain management  Performed by  CRNA/CAA: Inez Lees, BANDARSRNA: Tarah Mathew SRNA  Preanesthetic Checklist  Completed: patient identified, IV checked, site marked, risks and benefits discussed, surgical consent, monitors and equipment checked, pre-op evaluation and timeout performed  Prep:  Pt Position: supine  Sterile barriers:cap, gloves, mask and washed/disinfected hands  Prep: ChloraPrep  Patient monitoring: blood pressure monitoring, continuous pulse oximetry and EKG  Procedure    Sedation: yes  Performed under: local infiltration  Guidance:ultrasound guided    ULTRASOUND INTERPRETATION.  Using ultrasound guidance a 21 G gauge needle was placed in close proximity to the nerve, at which point, under ultrasound guidance anesthetic was injected in the area of the nerve and spread of the anesthesia was seen on ultrasound in close proximity thereto.  There were no abnormalities seen on ultrasound; a digital image was taken; and the patient tolerated the procedure with no complications. Images:still images obtained, printed/placed on chart    Laterality:right  Block Type:iPack  Injection Technique:single-shot  Needle Type:echogenic  Needle Gauge:21 G  Resistance on Injection: none    Medications Used: dexmedetomidine HCl (PRECEDEX) injection - Intravenous   15 mcg - 3/12/2024 7:24:00 AM  bupivacaine PF (MARCAINE) injection 0.25% - Injection   20 mL - 3/12/2024 7:24:00 AM      Post Assessment  Injection Assessment: negative aspiration for heme, no paresthesia on injection and incremental injection  Patient Tolerance:comfortable throughout block  Complications:no  Additional Notes  15 mcg precedex in block

## 2024-03-12 NOTE — PLAN OF CARE
Goal Outcome Evaluation:  Plan of Care Reviewed With: patient, family           Outcome Evaluation: Physical therapy evaluation complete.  Patient performs sit to/from stand with CGA and gait x125 feet with rolling walker, CGA with knee immobilizer in place.  Patient with no balance loss and manages direction changes without difficulty.  Patient educated on LE HEP and reviewed method for ascending/descending steps.  Patient denies DME needs, anticipate outpatient PT services at discharge.  Patient plans to return home from PACU today, no further PT needs in acute care setting.      Anticipated Discharge Disposition (PT): home with outpatient therapy services

## 2024-03-13 ENCOUNTER — TELEPHONE (OUTPATIENT)
Dept: ORTHOPEDIC SURGERY | Facility: CLINIC | Age: 64
End: 2024-03-13
Payer: MEDICARE

## 2024-03-13 ENCOUNTER — READMISSION MANAGEMENT (OUTPATIENT)
Dept: CALL CENTER | Facility: HOSPITAL | Age: 64
End: 2024-03-13
Payer: MEDICARE

## 2024-03-13 DIAGNOSIS — Z96.651 STATUS POST TOTAL RIGHT KNEE REPLACEMENT: Primary | ICD-10-CM

## 2024-03-13 RX ORDER — HYDROCODONE BITARTRATE AND ACETAMINOPHEN 7.5; 325 MG/1; MG/1
1 TABLET ORAL EVERY 4 HOURS PRN
Qty: 40 TABLET | Refills: 0 | Status: SHIPPED | OUTPATIENT
Start: 2024-03-13

## 2024-03-13 NOTE — TELEPHONE ENCOUNTER
Zoila Delgado Key: [id number] - PA Case ID: [id number] - Rx #: [id number] help? Call us at [phone]    Outcome  Approved: today    PA Case: [id number], Status: Approved, Coverage     Starts on: 1/1/2024 12:00:00 AM, Coverage Ends on: 12/31/2024 12:00:00 AM.     Questions? Contact [phone].    Drug  HYDROcodone-Acetaminophen 7.5-325MG tablets    Form  [employer] Electronic PA Form  Original Claim Info      Patient aware for RX approval.

## 2024-03-13 NOTE — OUTREACH NOTE
Prep Survey      Flowsheet Row Responses   [facility] [place] facility patient discharged from? [place]   Is LACE score < 7 ? Yes   Eligibility Readm Mgmt   Discharge diagnosis TOTAL KNEE ARTHROPLASTY AND ALL ASSOCIATED PROCEDURES   Does the patient have one of the following disease processes/diagnoses(primary or secondary)? Total Joint Replacement   Comments regarding appointments OP PT at [facility] [place]   Prep survey completed? Yes            Xin OBRIEN - Registered Nurse

## 2024-03-14 ENCOUNTER — TREATMENT (OUTPATIENT)
Dept: PHYSICAL THERAPY | Facility: CLINIC | Age: 64
End: 2024-03-14
Payer: MEDICARE

## 2024-03-14 DIAGNOSIS — Z96.651 STATUS POST TOTAL RIGHT KNEE REPLACEMENT: Primary | ICD-10-CM

## 2024-03-14 DIAGNOSIS — R26.9 ABNORMAL GAIT: ICD-10-CM

## 2024-03-14 DIAGNOSIS — Z74.09 IMPAIRED FUNCTIONAL MOBILITY, BALANCE, GAIT, AND ENDURANCE: ICD-10-CM

## 2024-03-14 DIAGNOSIS — M25.661 STIFFNESS OF RIGHT KNEE: ICD-10-CM

## 2024-03-14 DIAGNOSIS — R29.898 WEAKNESS OF RIGHT LOWER EXTREMITY: ICD-10-CM

## 2024-03-14 NOTE — PROGRESS NOTES
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   Cortizone shots until they quit working -  No known injury - OA -   S/P (R) TKA 3/12/2024 -    SINCE SX:    Increased pain after block worn off - makes her nauseous - Now on hydrocodone - 7.5    Pt reports having a [place] Bike at home -     PMHX:   Patellofemoral pain syndrome  Knee crepitus  Chondromalacia, knee  Gastroesophageal reflux disease without esophagitis  Monsivais's esophagus without dysplasia  Primary osteoarthritis of right knee  DDD (degenerative disc disease), lumbar  Trochanteric bursitis of left hip  Lumbar disc disease with radiculopathy          Pain  Current pain rating: 3  At best pain ratin  At worst pain ratin  Location: Posterior, post/medial knee/gastroc (R) -(pt notes Dr. Yee manipulated her HS)  Quality: Ache; Sharp w/ walking;  Alleviating factors: Rest/ ice meds.  Exacerbated by: AROM / Flexion/Extension.  Symptom course: Worse since block wore off -    Social Support  Patient lives at: Stairs in home - don't have to use them ; 5 steps to get in/out of home w/o hand rail  - Basement w/ railing.    Diagnostic Tests  X-ray: normal (Post-op WNL per report)    Treatments  Previous treatment: injection treatment  Current treatment: medication  Patient Goals  Patient/family treatment goals: Pain alleviation; normal ADLs, hiking, bike riding ; Work out at [employer] [employer] w/  (Summer) -         ___________________________________________________  Objective          Observations      Additional Knee Observation Details  Pt presents ambulating w/ RW w/ excessive (R) knee extension, minimal knee flexion w/ gait - Sits w/ (R) knee extended -   Pt's friend present - and is helping her at home -   Moderate swelling (R) knee - Healing incision anterior knee (R) -     Tenderness     Additional Tenderness Details  Tender (R) thigh w/ TPs (region of tourniquet) -   Tender diffusely around (R) knee - tightness posterior knee/popliteal region into gastroc proximally -      Active Range of Motion   Left Knee   Normal active range of motion    Right Knee   Flexion: 75 degrees with pain  Extension: 20 degrees with pain    Additional Active Range of Motion Details  After extensive edema mobilization, STM/DTM and gentle prolonged stretching; AROM increased to 8-deg Extension to 92-deg Flexion;     Patellar Mobility     Right Knee Patellar tendons within functional limits include the medial, lateral, superior and inferior.     Strength/Myotome Testing     Left Knee   Normal strength    Right Knee   Flexion: 4-  Extension: 4-  Quadriceps contraction: poor    Swelling     Right Knee Girth Measurement (cm)   Joint line: 38 cm        TREATMENT:   MANUAL THERAPY;   (W/ LE elevated)  Extensive edema mobilization entire lower leg, knee, thigh (R)  STM / DTM gastroc, posterior knee, quad / thigh  Patellar mobs all planes (Gd ll-lll)  Gentle prolonged stretching flexion / Extension (R) knee w/ prolonged holds     THERAPEUTIC EXERCISE:   [x]  QS on roll; 20/10 sec ea  [x]  Ankle pumps / circles, etc w/ LE elevated  [x]  Glute sets (B) / R-L (discussed w/ Pt to do regularly)  [x]  HEEL SLIDES w/ Belt assist Flexion / Extension w/ prolonged holds -  [x]  Fabricated HEP and reviewed w/ Pt and friend -     FUNCTIONAL ACTIVITIES:   TAPING / BRACING: NA  Education on edema control, positioning, manual edema mobilization, ace wrapping, etc.   Anatomy / Dysfunction education  Education on soft tissue characteristics,  appropriate stretching and why it is appropriate, frequency and duration -   Jt protection, ADL modification; use of ice on knee and heat on thigh from tourniquet w/ STM -  ___________________________________________________  Assessment & Plan       Assessment  Impairments: abnormal gait, abnormal muscle firing, abnormal muscle tone, abnormal or restricted ROM, activity intolerance, impaired balance, impaired physical strength, lacks appropriate home exercise program, pain with function and weight-bearing intolerance   Other impairment: Unable to ambulate stairs normally; Unable to squat, Unable to ambulate w/o AD -  Functional limitations: walking, pulling, pushing, uncomfortable because of pain, moving in bed, sitting, standing, stooping and unable to perform repetitive tasks   Assessment details: 65 yo female s/p (R) TKA 3-.  Doing well - Pt very motivated and normally very active w/ a good support system.     Prognosis: good    Goals  Plan Goals: GOALS:   SHORT TERM GOALS:6 weeks:    1) HEP Initiated for ROM, strengthening, functional activities to achieve LTGs -    2) Pain decreased 50% w/ ROM, gait / ADLs -    3) AROM  increased (R) knee to 5-110-deg to allow improved gait, stair ambulation  4) Pt demonstrating normal gait pattern w/ Assistive device ;     LONG TERM GOALS: 12 weeks (or at time of DISCHARGE):   1) (I) HEP ;   2) AROM 0-120 (R) and pain free; to allow normal gait, reciprocal stair climbing, exercises / ADLs, etc.   3) Strength (R) LE >4+/5 to allow LE ADL's , fitness-related activities including hiking, etc w/o restrictions;   4) Pt able to ambulate distances to allow ADLs and fitness goals / hiking w/ normal gait pattern and ambulate > 2 flights of stairs reciprocally w/ HR prn.   5) Functional Test KOS > 70/80        Plan  Therapy options: will be seen for skilled therapy services  Planned therapy interventions: manual therapy, neuromuscular re-education, soft tissue mobilization,  strengthening, stretching, therapeutic activities, postural training, flexibility, gait training, functional ROM exercises, home exercise program, joint mobilization, balance/weight-bearing training and abdominal trunk stabilization (Modalities prn; Taping prn;)  Frequency: 2x week  Duration in weeks: 12  Treatment plan discussed with: Patient and Friend.      ___________________________________________________  Manual Therapy:    15     mins  04962;  Therapeutic Exercise:    10     mins  98430;     Neuromuscular Ema:        mins  29725;    Therapeutic Activity:     15     mins  17130;   Self Care:                           mins  89745  Ultrasound:          mins  86667;  Iontophoresis:          mins  58214;    Electrical Stimulation:         mins  20273 ( );  Mechanical Traction:          mins  23576  Dry Needling          mins self-pay    Eval:   15   mins    Timed Treatment:   40   mins                  Total Treatment:     55   mins    PT SIGNATURE:     Russ Torrez, [place]  DATE TREATMENT INITIATED: 3/14/2024  ___________________________________________________  Initial Certification  Certification Period: 6/12/2024  I certify that the therapy services are furnished while this patient is under my care.  The services outlined above are required by this patient, and will be reviewed every 90 days.     PHYSICIAN: ________________________________  DATE: ______  Mariano Yee MD        Please sign and return via fax to [phone].. Thank you, [facility].  ______________________________________________________________________  [address] [place] [address]  Phone: [phone] Fax: [phone]

## 2024-03-18 ENCOUNTER — TREATMENT (OUTPATIENT)
Dept: PHYSICAL THERAPY | Facility: CLINIC | Age: 64
End: 2024-03-18
Payer: MEDICARE

## 2024-03-18 DIAGNOSIS — R29.898 WEAKNESS OF RIGHT LOWER EXTREMITY: ICD-10-CM

## 2024-03-18 DIAGNOSIS — Z96.651 STATUS POST TOTAL RIGHT KNEE REPLACEMENT: Primary | ICD-10-CM

## 2024-03-18 DIAGNOSIS — M25.661 STIFFNESS OF RIGHT KNEE: ICD-10-CM

## 2024-03-18 DIAGNOSIS — Z74.09 IMPAIRED FUNCTIONAL MOBILITY, BALANCE, GAIT, AND ENDURANCE: ICD-10-CM

## 2024-03-18 NOTE — PROGRESS NOTES
Physical Therapy Daily Treatment Note    [facility]  [address] [place] [address]  [phone] (phone)  [phone] (fax)    Patient: Zoila Delgado   : 1960  Diagnosis/ICD-10 Code:  Status post total right knee replacement [Z96.651]  Referring practitioner: Mariano Yee MD  Today's Date: 3/18/2024  Patient seen for 2 sessions    Visit Diagnoses:    ICD-10-CM ICD-9-CM   1. Status post total right knee replacement  Z96.651 V43.65   2. Impaired functional mobility, balance, gait, and endurance  Z74.09 V49.89   3. Weakness of right lower extremity  R29.898 729.89   4. Stiffness of right knee  M25.661 719.56              Subjective: Gabrielle stated that bending the R knee is much more uncomfortable than extension.     Objective     Treatment    Manual therapy:  STM to the periphery of the incision of the R knee.  PROM for knee flexion, supine, R quadriceps stretching, 20s x 3, over pressure with heel slides x 5    Therapeutic exercise  Heel slides x 15, R LE  Quad sets, roll at ankle, R LE x 10  Hanging R quadriceps stretch off of treatment table, 2 minutes  NuStep, seat at 10, arms at 11, work load of 4, x 5 minutes    NMR: verbal cues for exercise were given.    Supine R knee AROM after exercises and manual therapy: 5-75 degrees of flexion    Self care: I added heel slides to her HEP.  I asked her to do those before the belt stretch for knee flexion and asked her to allow the R LE to hang, in flexion, off of the side of the bed for stretching.     Assessment & Plan       Assessment  Assessment details: Gabrielle is very apprehensive with knee flexion.  She did notice some increased mobility today, especially after the NuStep.  She progressed her exercises and is doing better.     Plan  Plan details: Continue per treatment plan.                Timed:    Manual Therapy:    15     mins  33967;  Therapeutic Exercise:    25     mins  60125;     Neuromuscular Ema:     5    mins  70327;    Therapeutic Activity:          mins  60083;     Gait Training:           mins  98024;     Ultrasound:          mins  87332;    Aquatic Therapy         mins 28054;  Self Care                            mins   18526        Untimed:  Electrical Stimulation:         mins  26940 ( );  Traction:         mins  16893;   Dry Needling  (1-2 muscles)                 mins 20560 (Self-pay)  Dry Needling (3-4 muscles)      20561 (Self-pay)  Dry Needling Trial         DRYNDLTRIAL  (No Charge)    Timed Treatment:   45   mins   Total Treatment:     45   mins    Ayo Paz PT  Physical Therapist    [place] License:[id number]

## 2024-03-21 ENCOUNTER — TREATMENT (OUTPATIENT)
Dept: PHYSICAL THERAPY | Facility: CLINIC | Age: 64
End: 2024-03-21
Payer: MEDICARE

## 2024-03-21 DIAGNOSIS — R26.9 ABNORMAL GAIT: ICD-10-CM

## 2024-03-21 DIAGNOSIS — M25.661 STIFFNESS OF RIGHT KNEE: ICD-10-CM

## 2024-03-21 DIAGNOSIS — Z96.651 STATUS POST TOTAL RIGHT KNEE REPLACEMENT: Primary | ICD-10-CM

## 2024-03-21 DIAGNOSIS — R29.898 WEAKNESS OF RIGHT LOWER EXTREMITY: ICD-10-CM

## 2024-03-21 DIAGNOSIS — Z74.09 IMPAIRED FUNCTIONAL MOBILITY, BALANCE, GAIT, AND ENDURANCE: ICD-10-CM

## 2024-03-21 NOTE — PROGRESS NOTES
"Physical Therapy Daily Treatment Note    [facility]  [address] [place] [address]  [phone] (phone)  [phone] (fax)    Patient: Zoila Delgado   : 1960  Diagnosis/ICD-10 Code:  Status post total right knee replacement [Z96.651]  Referring practitioner: Mariano Yee MD  Today's Date: 3/21/2024  Patient seen for 3 sessions    Visit Diagnoses:    ICD-10-CM ICD-9-CM   1. Status post total right knee replacement  Z96.651 V43.65   2. Impaired functional mobility, balance, gait, and endurance  Z74.09 V49.89   3. Weakness of right lower extremity  R29.898 729.89   4. Stiffness of right knee  M25.661 719.56   5. Abnormal gait  R26.9 781.2              Subjective: Gabrielle stated that she is feeling better and was able to make it here, yesterday, and do the NuStep independently.     Objective     Treatment    Therapeutic exercise  NuStep, seat at 9, work load of 5 x 5 minutes  Melqui[place] hip abduction, 25 lbs., 10 and 15 repetitions  Step ups, 2\", x 10, B, using column for balance  Lateral step downs, 2\", x 10, B, using column for balance  LAQs x 10, B  Heel slides, R knee x 15, and 5 more with tolerable over pressure at end range  7. Quad sets, R knee roll at R ankle, x 10    Manual therapy;  Seated R quadriceps stretch, 20s x 3, in hook lying, STM to the periphery of the R knee's incision and in supine, R quadriceps stretch, 20s x 3    NMR: verbal cues for the hip abduction included going out to a comfortable range of abduction and using eccentric control on the return, with the step exercises to stand erect, gaze to the ground ahead, 20', for improved balance and to use eccentric control with the static leg.  For the lateral step cues to land parallel to the static foot were used.     Post treatment, supine R knee AROM: 2-91 degrees of flexion     Assessment & Plan       Assessment  Assessment details: Supine AROM of the R knee improved by 19 degrees.  Gabrielle" required verbal cues for exercise technique and is slowly tolerating the intensity of stretching the R quadriceps more.      Plan  Plan details: Continue with current treatment and progress as tolerated.                Timed:    Manual Therapy:    13    mins  56924;  Therapeutic Exercise:    22     mins  77417;     Neuromuscular Ema:    10    mins  33634;    Therapeutic Activity:          mins  92127;     Gait Training:           mins  13750;     Ultrasound:          mins  60669;    Aquatic Therapy         mins 84972;  Self Care                            mins   34649        Untimed:  Electrical Stimulation:         mins  19816 ( );  Traction:         mins  47977;   Dry Needling  (1-2 muscles)                 mins 20560 (Self-pay)  Dry Needling (3-4 muscles)      20561 (Self-pay)  Dry Needling Trial         DRYNDLTRIAL  (No Charge)    Timed Treatment:   45   mins   Total Treatment:     45   mins    Ayo Paz PT  Physical Therapist    [place] License:[id number]

## 2024-03-26 ENCOUNTER — TREATMENT (OUTPATIENT)
Dept: PHYSICAL THERAPY | Facility: CLINIC | Age: 64
End: 2024-03-26
Payer: MEDICARE

## 2024-03-26 DIAGNOSIS — M25.661 STIFFNESS OF RIGHT KNEE: ICD-10-CM

## 2024-03-26 DIAGNOSIS — R29.898 WEAKNESS OF RIGHT LOWER EXTREMITY: ICD-10-CM

## 2024-03-26 DIAGNOSIS — Z96.651 STATUS POST TOTAL RIGHT KNEE REPLACEMENT: Primary | ICD-10-CM

## 2024-03-26 DIAGNOSIS — Z74.09 IMPAIRED FUNCTIONAL MOBILITY, BALANCE, GAIT, AND ENDURANCE: ICD-10-CM

## 2024-03-26 DIAGNOSIS — R26.9 ABNORMAL GAIT: ICD-10-CM

## 2024-03-26 NOTE — PROGRESS NOTES
"Physical Therapy Daily Treatment Note    [facility]  [address] [place] [address]  [phone] (phone)  [phone] (fax)    Patient: Zoila Delgado   : 1960  Diagnosis/ICD-10 Code:  Status post total right knee replacement [Z96.651]  Referring practitioner: Mariano Yee MD  Today's Date: 3/26/2024  Patient seen for 4 sessions    Visit Diagnoses:    ICD-10-CM ICD-9-CM   1. Status post total right knee replacement  Z96.651 V43.65   2. Impaired functional mobility, balance, gait, and endurance  Z74.09 V49.89   3. Weakness of right lower extremity  R29.898 729.89   4. Stiffness of right knee  M25.661 719.56   5. Abnormal gait  R26.9 781.2              Subjective: After using the SC Gabrielle stated that she was going to purchase one from [employer].     Objective      Treatment     Therapeutic exercise  NuStep, seat at 9, work load of 5 x 5 minutes  [place] hip abduction, 25 lbs., 10 and 15 repetitions  Melquiades leg press, seat at 9, work load of 90 lbs., 10 x 2, avoiding full lock out of knees  Step ups, 2\", x 10, B, using column for balance  Lateral step downs, 2\", x 10, B, using column for balance  LAQ. R LE, with concentric and eccentric assist x 5  Heel slides, R knee x 15, and 5 more with tolerable over pressure at end range  7. Quad sets, R knee roll at R ankle, x 10    NMR: verbal cues for exercise included using eccentric control with each of the Melquiades machines, and with the leg press to avoid locking knees.  With the step exercise to gaze to the ground ahead for improved balance and to use eccentric control with the static leg.      Gait trainin' on level surface, using SC, with 3 point technique, SBA-1    Manual therapy:  STM to the periphery of the incision of the R knee, hamstring stretch, R knee, supine, 30s x 3, then the R quadriceps, 30s x 3    Post treatment AROM of the R knee, supine, 2-101 degrees of flexion    Assessment & Plan "       Assessment  Assessment details: Gabrielle is slowly progressing with her exercises, supine AROM of the R knee and began using a SC today showing good technique on level surface.      Plan  Plan details: Continue per treatment plan.                Timed:    Manual Therapy:    10     mins  29078;  Therapeutic Exercise:    23     mins  45588;     Neuromuscular Ema:    8    mins  94476;    Therapeutic Activity:          mins  89122;     Gait Trainin     mins  74163;     Ultrasound:          mins  90307;    Aquatic Therapy         mins 49876;  Self Care                            mins   99495        Untimed:  Electrical Stimulation:         mins  19895 ( );  Traction:         mins  83662;   Dry Needling  (1-2 muscles)                 mins  (Self-pay)  Dry Needling (3-4 muscles)       (Self-pay)  Dry Needling Trial         DRYNDLTRIAL  (No Charge)    Timed Treatment:   45   mins   Total Treatment:     45   mins    Ayo Paz PT  Physical Therapist    [place] License:[id number]

## 2024-03-27 ENCOUNTER — OFFICE VISIT (OUTPATIENT)
Dept: ORTHOPEDIC SURGERY | Facility: CLINIC | Age: 64
End: 2024-03-27
Payer: MEDICARE

## 2024-03-27 VITALS — BODY MASS INDEX: 20.25 KG/M2 | HEIGHT: 66 IN | WEIGHT: 126 LBS

## 2024-03-27 DIAGNOSIS — Z96.651 STATUS POST TOTAL RIGHT KNEE REPLACEMENT: Primary | ICD-10-CM

## 2024-03-27 PROCEDURE — 99024 POSTOP FOLLOW-UP VISIT: CPT | Performed by: NURSE PRACTITIONER

## 2024-03-27 PROCEDURE — 1160F RVW MEDS BY RX/DR IN RCRD: CPT | Performed by: NURSE PRACTITIONER

## 2024-03-27 PROCEDURE — 1159F MED LIST DOCD IN RCRD: CPT | Performed by: NURSE PRACTITIONER

## 2024-03-27 RX ORDER — HYDROXYZINE HYDROCHLORIDE 25 MG/1
25 TABLET, FILM COATED ORAL NIGHTLY PRN
Qty: 30 TABLET | Refills: 0 | Status: SHIPPED | OUTPATIENT
Start: 2024-03-27

## 2024-03-27 NOTE — PROGRESS NOTES
"Physical Therapy Daily Treatment Note    [facility]  [address] [place] [address]  [phone] (phone)  [phone] (fax)    Patient: Zoila Delgado   : 1960  Diagnosis/ICD-10 Code:  Status post total right knee replacement [Z96.651]  Referring practitioner: Mariano Yee MD  Today's Date: 3/28/2024  Patient seen for 5 sessions    Visit Diagnoses:    ICD-10-CM ICD-9-CM   1. Status post total right knee replacement  Z96.651 V43.65   2. Impaired functional mobility, balance, gait, and endurance  Z74.09 V49.89   3. Weakness of right lower extremity  R29.898 729.89   4. Stiffness of right knee  M25.661 719.56   5. Abnormal gait  R26.9 781.2              Subjective   Pt states she went to see the nurse practitioner yesterday who gave her steroid pack to the help swelling and reduce pain levels. She states it already feels better today.    Objective   See Exercise, Manual, and Modality Logs for complete treatment.     Therapeutic exercise  NuStep, seat at 9, work load of 6 x 6 minutes  -prior to session on own  [place] hip abduction, 30 lbs, 3x10  Melquiades leg press, seat at 9, work load of 90 lbs., 10 x 3, avoiding full lock out of knees  Step ups, 2\", x 10, B, using column for balance  Lateral step downs, 2\", x 10, B, using column for balance  LAQ. R LE, with concentric and eccentric assist x 5 *defer  Heel slides, R knee x 15 w/ 5 sec holds, (defer: 5 more with tolerable over pressure at end range)  7. Quad sets, R knee roll at R ankle, x 10--3 sec hold    Manual  -Effusion massage anterior/med/lat/post knee, x8 min   -STM to prox calf/distal HS    Assessment/Plan  Pt tolerated session well overall, reporting an appropriate level of muscle fatigue following strength exercises. Increased reps for leg press and hip abduction machines. Also encouraged prolonged holds for heel slides and quad sets. Required min vcs for form during step ups (to not push off from " bottom leg and instead focusing on using leading leg), which she was able to correct. Continue PT POC.        Timed:    Manual Therapy:    10     mins  45194;  Therapeutic Exercise:    23     mins  17549;     Neuromuscular Ema:    0    mins  03292;    Therapeutic Activity:     10     mins  87992;     Gait Trainin     mins  05707;     Ultrasound:     0     mins  22197;    Aquatic Therapy    0     mins 20648;  Self Care                       0     mins   37836        Untimed:  Electrical Stimulation:    0     mins  74201 ( );  Traction:    0     mins  51877;   Dry Needling  (1-2 muscles)            0     mins 99064 (Self-pay)  Dry Needling (3-4 muscles) 0     89264 (Self-pay)  Dry Needling Trial    0     DRYNDLTRIAL  (No Charge)    Timed Treatment:   43   mins   Total Treatment:     43   mins    Jaci Luo PT  Physical Therapist    [place] License:[id number]

## 2024-03-27 NOTE — PROGRESS NOTES
CC: s/p right total knee arthroplasty, DOS 3/12/2024  Interval History: Zoila Delgado returns for 2 week postoperative visit.  She is doing well. Pain is controlled with pain medication and is  improving. She denies any wound problem, fevers, or chills. Patient is continuing to work with outpatient PT. Ambulating with cane. Continuing DVT prophylaxis with use of aspirin.     Physical Examination:   Right knee was examined   Incision clean and dry   ROM 2-101,  3+/5 strength   Stable to varus and valgus stress   Flex/extend ankle and toes   Positive sensation right foot   No calf pain, negative Homans sign bilaterally    Assessment/Plan:  Zoila Delgado is recovering from surgery as expected.  We will continue outpatient therapy for range of motion, strengthening, and gait normalization.    She is to follow up in clinic in 4 weeks with xrays. Patient had all question answered today. Will continue DVT prophylaxis for an additional 2 weeks. Discussed with patient to avoid immersing incision for 4 weeks total after surgery.     Medications:  New Medications Ordered This Visit   Medications    hydrOXYzine (ATARAX) 25 MG tablet     Sig: Take 1 tablet by mouth At Night As Needed for Itching.     Dispense:  30 tablet     Refill:  0       SYD Landis

## 2024-03-28 ENCOUNTER — TREATMENT (OUTPATIENT)
Dept: PHYSICAL THERAPY | Facility: CLINIC | Age: 64
End: 2024-03-28
Payer: MEDICARE

## 2024-03-28 DIAGNOSIS — R29.898 WEAKNESS OF RIGHT LOWER EXTREMITY: ICD-10-CM

## 2024-03-28 DIAGNOSIS — Z74.09 IMPAIRED FUNCTIONAL MOBILITY, BALANCE, GAIT, AND ENDURANCE: ICD-10-CM

## 2024-03-28 DIAGNOSIS — R26.9 ABNORMAL GAIT: ICD-10-CM

## 2024-03-28 DIAGNOSIS — M25.661 STIFFNESS OF RIGHT KNEE: ICD-10-CM

## 2024-03-28 DIAGNOSIS — Z96.651 STATUS POST TOTAL RIGHT KNEE REPLACEMENT: Primary | ICD-10-CM

## 2024-03-29 ENCOUNTER — TREATMENT (OUTPATIENT)
Dept: PHYSICAL THERAPY | Facility: CLINIC | Age: 64
End: 2024-03-29
Payer: MEDICARE

## 2024-03-29 DIAGNOSIS — Z96.651 STATUS POST TOTAL RIGHT KNEE REPLACEMENT: Primary | ICD-10-CM

## 2024-03-29 DIAGNOSIS — M25.661 STIFFNESS OF RIGHT KNEE: ICD-10-CM

## 2024-03-29 DIAGNOSIS — R26.9 ABNORMAL GAIT: ICD-10-CM

## 2024-03-29 DIAGNOSIS — Z74.09 IMPAIRED FUNCTIONAL MOBILITY, BALANCE, GAIT, AND ENDURANCE: ICD-10-CM

## 2024-03-29 DIAGNOSIS — R29.898 WEAKNESS OF RIGHT LOWER EXTREMITY: ICD-10-CM

## 2024-03-29 NOTE — PROGRESS NOTES
"Physical Therapy Daily Treatment Note    [facility]  [address] [place] [address]  [phone] (phone)  [phone] (fax)    Patient: Zoila Delgado   : 1960  Diagnosis/ICD-10 Code:  Status post total right knee replacement [Z96.651]  Referring practitioner: Mariano Yee MD  Today's Date: 3/29/2024  Patient seen for 6 sessions    Visit Diagnoses:    ICD-10-CM ICD-9-CM   1. Status post total right knee replacement  Z96.651 V43.65   2. Impaired functional mobility, balance, gait, and endurance  Z74.09 V49.89   3. Weakness of right lower extremity  R29.898 729.89   4. Stiffness of right knee  M25.661 719.56   5. Abnormal gait  R26.9 781.2              Subjective   Pt states that she has already done her exercises this morning and it is feeling pretty good.     Objective   See Exercise, Manual, and Modality Logs for complete treatment.     Therapeutic exercise  NuStep, seat at 9, work load of 6 x 6 minutes  -prior to session on own  [place] hip abduction, 30 lbs, 3x10 *defer  [place] leg press, seat at 9, work load of 90 lbs., 2x10 RLE and x10 BLE, avoiding full lock out of knees  Step ups, 2\", x 10, B, using column for balance *defer  Lateral step downs, 2\", x 10, B, using column for balance*defer  LAQ. R LE, with concentric and eccentric assist x 5 *defer  Heel slides, R knee x 15 w/ 5 sec holds, (defer: 5 more with tolerable over pressure at end range) *defer  8.  Quad sets, R knee roll at R ankle, x 10--3 sec hold *defer  9. SAQs, 5 sec hold x10  10. Bridges, 5 sec hold x15  11. Dara stepovers, 3 hurdles, x3 laps down and back w/ UE support from rail  12. TKEs w/ ball against wall, 3 sec hold x10     Manual  -Effusion massage anterior/med/lat/post knee, x5 min   -STM to prox calf/distal HS  x2 min  -Knee flexion stretch/traction, sitting, 30 sec hold 4-5     Post treatment AROM of the R knee, supine, 1-101 degrees of flexion     Assessment/Plan  Pt " tolerated session well overall, reporting an appropriate level of muscle fatigue following strength exercises. Pt was given a printout of SAQs to add to HEP. Added jerrell step-overs and required cues for knee extension/heel strike. Continue PT POC.          Timed:    Manual Therapy:    12     mins  55789;  Therapeutic Exercise:    0     mins  29054;     Neuromuscular Ema:    25    mins  67278;    Therapeutic Activity:     0     mins  30243;     Gait Trainin     mins  97923;     Ultrasound:     0     mins  83577;    Aquatic Therapy    0     mins 24473;  Self Care                       0     mins   08656        Untimed:  Electrical Stimulation:    0     mins  33086 ( );  Traction:    0     mins  58098;   Dry Needling  (1-2 muscles)            0     mins 24173 (Self-pay)  Dry Needling (3-4 muscles) 0     05512 (Self-pay)  Dry Needling Trial    0     DRYNDLTRIAL  (No Charge)    Timed Treatment:   45   mins   Total Treatment:     45   mins    Jaci Luo PT  Physical Therapist    [place] License:[id number]

## 2024-04-01 ENCOUNTER — TREATMENT (OUTPATIENT)
Dept: PHYSICAL THERAPY | Facility: CLINIC | Age: 64
End: 2024-04-01
Payer: MEDICARE

## 2024-04-01 DIAGNOSIS — R26.9 ABNORMAL GAIT: ICD-10-CM

## 2024-04-01 DIAGNOSIS — M25.661 STIFFNESS OF RIGHT KNEE: ICD-10-CM

## 2024-04-01 DIAGNOSIS — Z74.09 IMPAIRED FUNCTIONAL MOBILITY, BALANCE, GAIT, AND ENDURANCE: ICD-10-CM

## 2024-04-01 DIAGNOSIS — Z96.651 STATUS POST TOTAL RIGHT KNEE REPLACEMENT: Primary | ICD-10-CM

## 2024-04-01 DIAGNOSIS — R29.898 WEAKNESS OF RIGHT LOWER EXTREMITY: ICD-10-CM

## 2024-04-01 NOTE — PROGRESS NOTES
"Physical Therapy Daily Treatment Note    [facility]  [address] [place] [address]  [phone] (phone)  [phone] (fax)    Patient: Zoila Delgado   : 1960  Diagnosis/ICD-10 Code:  Status post total right knee replacement [Z96.651]  Referring practitioner: Mariano Yee MD  Today's Date: 2024  Patient seen for 7 sessions    Visit Diagnoses:    ICD-10-CM ICD-9-CM   1. Status post total right knee replacement  Z96.651 V43.65   2. Impaired functional mobility, balance, gait, and endurance  Z74.09 V49.89   3. Weakness of right lower extremity  R29.898 729.89   4. Stiffness of right knee  M25.661 719.56   5. Abnormal gait  R26.9 781.2              Subjective   Pt states that her knee felt fine after last time. She states she is getting a little bit better each day. She came into [facility] earlier and did 6 min at lvl 6 and then did it again right before our session.     Objective   See Exercise, Manual, and Modality Logs for complete treatment.     Therapeutic exercise  NuStep, seat at 9, work load of 6 x 6 minutes  -prior to session on own  [place]qui[place] hip abduction, 30 lbs, 3x10 *defer  [place]qui[place] leg press, seat at 9, work load of 90 lbs. X10 RLE, 100 lbs x10 RLE and x10 BLE, avoiding full lock out of knees  Step ups, 2\", x 10, B, using column for balance *defer  Lateral step downs, 2\", x 10, B, using column for balance*defer  LAQ. R LE, x8 3-5 sec hold  Heel slides, R knee x 15 w/ 5 sec holds, (defer: 5 more with tolerable over pressure at end range) *defer  8.  Quad sets, R knee roll at R ankle, x 10--3 sec hold *defer  9. SAQs, 5 sec hold x10 *defer  10. Bridges, 5 sec hold x15 *defer  11. Dara stepovers, 5 hurdles, x3 laps down and back w/ UE support from rail  12. TKEs w/ ball against wall, 3 sec hold x10 *defer  13. Upright bike, seat 5, x5 min forward and retro pedaling      Manual  -Effusion massage anterior/med/lat/post knee, x5 min *defer  -STM " "to prox calf/distal HS  x2 min  -Knee flexion stretch/traction, sitting, 30 sec hold 4-5      AAROM w/ green strap during heel slides: 106 degrees flexion     Assessment/Plan  Pt tolerated session well overall, reporting an appropriate level of muscle fatigue following strength exercises. Added upright bike this session as pt has a Peloton she would like to ride. Pt primarily performed forward and retro \"rocking\" but was able to get a full revolution one time. Pt continues to improve flexion ROM. Continue PT POC.          Timed:    Manual Therapy:    12     mins  05575;  Therapeutic Exercise:    23     mins  02262;     Neuromuscular Ema:    0    mins  99231;    Therapeutic Activity:     0     mins  21654;     Gait Training:      10     mins  86061;     Ultrasound:     0     mins  10332;    Aquatic Therapy    0     mins 93107;  Self Care                       0     mins   76013        Untimed:  Electrical Stimulation:    0     mins  82159 ( );  Traction:    0     mins  96112;   Dry Needling  (1-2 muscles)            0     mins 34071 (Self-pay)  Dry Needling (3-4 muscles) 0     20561 (Self-pay)  Dry Needling Trial    0     DRYNDLTRIAL  (No Charge)    Timed Treatment:   45   mins   Total Treatment:     45   mins    Jaci Lou PT  Physical Therapist    [place] License:[id number]  "

## 2024-04-03 ENCOUNTER — TREATMENT (OUTPATIENT)
Dept: PHYSICAL THERAPY | Facility: CLINIC | Age: 64
End: 2024-04-03
Payer: MEDICARE

## 2024-04-03 DIAGNOSIS — R26.9 ABNORMAL GAIT: ICD-10-CM

## 2024-04-03 DIAGNOSIS — M25.661 STIFFNESS OF RIGHT KNEE: ICD-10-CM

## 2024-04-03 DIAGNOSIS — R29.898 WEAKNESS OF RIGHT LOWER EXTREMITY: ICD-10-CM

## 2024-04-03 DIAGNOSIS — Z74.09 IMPAIRED FUNCTIONAL MOBILITY, BALANCE, GAIT, AND ENDURANCE: ICD-10-CM

## 2024-04-03 DIAGNOSIS — Z96.651 STATUS POST TOTAL RIGHT KNEE REPLACEMENT: Primary | ICD-10-CM

## 2024-04-04 ENCOUNTER — TREATMENT (OUTPATIENT)
Dept: PHYSICAL THERAPY | Facility: CLINIC | Age: 64
End: 2024-04-04
Payer: MEDICARE

## 2024-04-04 DIAGNOSIS — R26.9 ABNORMAL GAIT: ICD-10-CM

## 2024-04-04 DIAGNOSIS — Z74.09 IMPAIRED FUNCTIONAL MOBILITY, BALANCE, GAIT, AND ENDURANCE: ICD-10-CM

## 2024-04-04 DIAGNOSIS — Z96.651 STATUS POST TOTAL RIGHT KNEE REPLACEMENT: Primary | ICD-10-CM

## 2024-04-04 DIAGNOSIS — M25.661 STIFFNESS OF RIGHT KNEE: ICD-10-CM

## 2024-04-04 DIAGNOSIS — R29.898 WEAKNESS OF RIGHT LOWER EXTREMITY: ICD-10-CM

## 2024-04-04 NOTE — PROGRESS NOTES
Physical Therapy Daily Note    Patient Name: Zoila Delgado         :  1960  Referring Physician: Mariano Yee MD  Treatment Date: 2024  Date of Initial Visit: Type: THERAPY  Noted: 3/14/2024    Visit Diagnoses:    ICD-10-CM ICD-9-CM   1. Status post total right knee replacement  Z96.651 V43.65   2. Impaired functional mobility, balance, gait, and endurance  Z74.09 V49.89   3. Weakness of right lower extremity  R29.898 729.89   4. Stiffness of right knee  M25.661 719.56   5. Abnormal gait  R26.9 781.2     Patient seen for 9 sessions  _____________________________________________________    Subjective   Zoila Delgado reports: feeling much better - improved mobility and gait w/ tape -     Objective   Pt ambulating w/ st cane - flexed knee gait, but better overall - tape loose -   Scar tight / tender, but improved     AROM to start 110-deg flexion;  Limted extension  After MT / stretching 122-deg Flexion and 8-deg extension     TREATMENT:   MANUAL THERAPY:   EDEMA MOBILIZATION (R) KNEE  EXTENSIVE SCAR MOBILIZATION TECHNIQUES  DTM to Scar/QUAD TENDON, Quad w/ KNEE FLEXION STRETCH w/ MFR  DTM to Posterior knee w/ knee ext stretch  GENTLE MANUALLY ASSISTED KNEE STRETCHING FLEX/Ext w/ Prolonged holds and DTM      EXERCISES:   NUSTEP x 10 min L6  KNEE FLEXION STRETCH on STEP 5/30 sec ea  SAQ after Taping x 15/5 sec ea w/ NMR to facil VMO  Gait work 1 lap track after taping to assess gait and facilitate knee extension / quad firing   REC BIKE seat 5 Fwd/Retro (full revolutions) x 10 min    NMR: Verbal and tactile cues to facilitate VMO/Quad musculature statically and dynamically. - -        Functional / Therapeutic Activities:    TAPING / BRACING: K-tape to unload PF jt to reduce pain and allow improved knee extension, gait and quad function -   SEE EXERCISE FLOW SHEET -   Jt protection, ADL modification; Posture and      Assessment/Plan  63 yo female s/p (R) TKA 3- -   PF pain preventing  knee extension, gait and proper quad / vmo firing -       Pt demonstrated improved VMO/Quad firing and able to perform SAQ after taping - Pt demonstrated improved gait w/ much improved knee extension and able to walk w/o her can w/ minimal antalgia - after taping -   Much improved AROM after MT w/o pain -     Scar very painful / tender/tight and painful with MT, but very helpful  -     Progress strengthening /stabilization /functional activity       _________________________________________________    Manual Therapy:            20   mins  57203;  Therapeutic Exercise:    15    mins  53168;     Neuromuscular Ema:    03    mins  87648;    Therapeutic Activity:     10      mins  40787;     Ultrasound:                          mins  04492;  Iontophoresis:                     mins  87002;    Electrical Stimulation:         mins  42639 ( );  Mechanical Traction:          mins  39103  Dry Needling                       mins self-pay     Timed Treatment:   48   mins                  Total Treatment:     48    mins        Russ Torrez PT  Physical Therapist  Lic # [id number]

## 2024-04-04 NOTE — PROGRESS NOTES
Physical Therapy Daily Note    Patient Name: Zoila Delgado         :  1960  Referring Physician: Mariano Yee MD  Treatment Date: 4/3/2024  Date of Initial Visit: Type: THERAPY  Noted: 3/14/2024    Visit Diagnoses:    ICD-10-CM ICD-9-CM   1. Status post total right knee replacement  Z96.651 V43.65   2. Impaired functional mobility, balance, gait, and endurance  Z74.09 V49.89   3. Weakness of right lower extremity  R29.898 729.89   4. Stiffness of right knee  M25.661 719.56   5. Abnormal gait  R26.9 781.2     Patient seen for 8 sessions  _____________________________________________________    Subjective   Zoila Delgado reports: feeling better overall - Difficulty w/ bending / straightening her knee, but improved - Pain anterior knee w/ SLR and unable to do SAQ due to pain / weakness - Now walking w/ st cane -   Given an steroid pack by Dr. Yee's APRN - amost finished them     Objective   Pt ambulating w/ st cane - Flexed (R) knee -   Severe atrophy quad / VMO (R) - Moderate swelling w/ (+) Patellar ballotment (R) knee-  Pt unable to perform SAQ due to pain / weakness  Poor quad /VMO firing -   Scar very thick and tight/tender proximal 2/3 > distal 1/3 -   AROM (at start of session): 15 to 100-deg w/ pain anterior knee w/ flexion -   AROM (after EXTENSIVE MT);  5 to 120-deg w/o pain  AFTER TAPING: Pt demonstrated improved VMO/Quad firing and able to perform SAQ after taping - Pt demonstrated improved gait w/ much improved knee extension and able to walk w/o her can w/ minimal antalgia -     TREATMENT:   MANUAL THERAPY:   EDEMA MOBILIZATION (R) KNEE  EXTENSIVE SCAR MOBILIZATION TECHNIQUES  DTM to Scar/QUAD TENDON, Quad w/ KNEE FLEXION STRETCH  DTM to Posterior nee w/ knee ext stretch  GENTLE MANUALLY ASSISTED KNEE STRETCHING FLEX/Ext w/ Prolonged holds and DTM     EXERCISES:   NUSTEP x 10 min L6  KNEE FLEXION STRETCH on STEP 5/30 sec ea  SAQ after Taping x 15/5 sec ea  REC BIKE seat 5 Fwd/Retro (full  revolutions) x 10 min         Functional / Therapeutic Activities:    TAPING / BRACING: K-TAPE to unload PF jt  Gait work 1 lap on track after taping to facilitate knee extension -  Knee assessment -   Jt protection, ADL modification; Posture and      Assessment/Plan  63 yo female s/p (R) TKA 3- -   PF pain preventing knee extension, gait and proper quad / vmo firing -      Pt demonstrated improved VMO/Quad firing and able to perform SAQ after taping - Pt demonstrated improved gait w/ much improved knee extension and able to walk w/o her can w/ minimal antalgia - after taping -   Much improved AROM after MT w/o pain -     Progress strengthening /stabilization /functional activity       _________________________________________________    Manual Therapy:            23   mins  90541;  Therapeutic Exercise:    15    mins  02863;     Neuromuscular Ema:        mins  24156;    Therapeutic Activity:     15      mins  12469;     Ultrasound:                          mins  06557;  Iontophoresis:                     mins  58036;    Electrical Stimulation:         mins  44333 ( );  Mechanical Traction:          mins  97052  Dry Needling                       mins self-pay     Timed Treatment:   53    mins                  Total Treatment:     53    mins        Russ Torrez PT  Physical Therapist  Lic # [id number]

## 2024-04-08 ENCOUNTER — TREATMENT (OUTPATIENT)
Dept: PHYSICAL THERAPY | Facility: CLINIC | Age: 64
End: 2024-04-08
Payer: MEDICARE

## 2024-04-08 DIAGNOSIS — R29.898 WEAKNESS OF RIGHT LOWER EXTREMITY: ICD-10-CM

## 2024-04-08 DIAGNOSIS — R26.9 ABNORMAL GAIT: ICD-10-CM

## 2024-04-08 DIAGNOSIS — M25.661 STIFFNESS OF RIGHT KNEE: ICD-10-CM

## 2024-04-08 DIAGNOSIS — Z74.09 IMPAIRED FUNCTIONAL MOBILITY, BALANCE, GAIT, AND ENDURANCE: ICD-10-CM

## 2024-04-08 DIAGNOSIS — Z96.651 STATUS POST TOTAL RIGHT KNEE REPLACEMENT: Primary | ICD-10-CM

## 2024-04-08 PROCEDURE — 97110 THERAPEUTIC EXERCISES: CPT | Performed by: PHYSICAL THERAPIST

## 2024-04-08 PROCEDURE — 97530 THERAPEUTIC ACTIVITIES: CPT | Performed by: PHYSICAL THERAPIST

## 2024-04-08 PROCEDURE — 97140 MANUAL THERAPY 1/> REGIONS: CPT | Performed by: PHYSICAL THERAPIST

## 2024-04-08 RX ORDER — PREDNISONE 10 MG/1
TABLET ORAL
Qty: 39 TABLET | Refills: 0 | Status: SHIPPED | OUTPATIENT
Start: 2024-04-08

## 2024-04-08 NOTE — PROGRESS NOTES
"Physical Therapy Daily Treatment Note    [facility]  [address] [place] [address]  [phone] (phone)  [phone] (fax)    Patient: Zoila Delgado   : 1960  Diagnosis/ICD-10 Code:  Status post total right knee replacement [Z96.651]  Referring practitioner: Mariano Yee MD  Today's Date: 2024  Patient seen for 10 sessions    Visit Diagnoses:    ICD-10-CM ICD-9-CM   1. Status post total right knee replacement  Z96.651 V43.65   2. Impaired functional mobility, balance, gait, and endurance  Z74.09 V49.89   3. Weakness of right lower extremity  R29.898 729.89   4. Stiffness of right knee  M25.661 719.56   5. Abnormal gait  R26.9 781.2              Subjective Evaluation    History of Present Illness    Subjective comment: I took the tape off because knee was aching and felt like it was making it worse.  I thought about asking MD for another round of steroids to help with the knee swelling       Objective     Gait:  ambulating w/o AD today - exhibits R flexed knee gait with mild compensation, but overall improving gait mechanics      R Knee AROM:   After MT / stretching 125-deg Flexion and 7-deg extension     Manual Therapy:   Edema mobilization R knee  Extensive scar mobilization techniques  DTM to Scar/Quad tendon, Quad tissues w/ MFR during Knee flex stretch   DTM to Posterior knee w/ knee ext stretch  Gentle knee flexion / extension with manual assist - Prolonged holds and DTM      EXERCISES:   NuStep x 10 min L6  Knee Flexion stretch on step 5/30 sec ea  SAQ x 10/5 sec ea w/ NMR to facil VMO  Airdyne Bike (seat 4) Fwd (full revolutions) x 6 min  Trial of 4\" step ups R LE - Noted caution / apprehension and difficulty without UE support     NMR: Verbal and tactile cues to facilitate VMO/Quad musculature statically and dynamically     Education:  Jt protection, ADL modification, Symptom management, Posture and Body mechanics          Assessment & Plan "       Assessment  Assessment details: Patient is a 63yo F s/p (R) TKA 3-.  She reports seeing improvement from week to week in ROM/ mobility.  Continued with manual techniques and exercise focusing on ROM/flexibility, quad activation, decreasing muscle tension, and improving functional mobility / gait.  Her scar tight w/ tenderness most notably proximally and mid/distal / lateral quad tissues but improves with manual techniques.  Less PF pain today during quad activation - reported feeling strain more in quads.  She was able to do full revolutions on Airdyne bike with minimal compensation at start and minimal knee discomfort.  Continue skilled therapy with progression of ex/activity to include more strengthening /stabilization / functional activity.               Timed:    Manual Therapy:    21     mins  29812;  Therapeutic Exercise:    13     mins  91180;     Neuromuscular Ema:    3    mins  85518;    Therapeutic Activity:     10     mins  73597;     Gait Training:      -     mins  57184;     Ultrasound:     -     mins  75482;    Aquatic Therapy    -     mins 26393;  Self Care                       -     mins   03425        Untimed:  Electrical Stimulation:    -     mins  21653 ( );  Traction:    -     mins  35809;   Dry Needling  (1-2 muscles)            -     mins 20560 (Self-pay)  Dry Needling (3-4 muscles) -     20561 (Self-pay)  Dry Needling Trial    -     DRYNDLTRIAL  (No Charge)    Timed Treatment:   47   mins   Total Treatment:     47   mins    Sumaya Malik PT  Physical Therapist    [place] License:  [id number]

## 2024-04-10 DIAGNOSIS — R10.9 ABDOMINAL DISCOMFORT: ICD-10-CM

## 2024-04-10 DIAGNOSIS — R11.0 NAUSEA: ICD-10-CM

## 2024-04-10 RX ORDER — PANTOPRAZOLE SODIUM 40 MG/1
40 TABLET, DELAYED RELEASE ORAL 2 TIMES DAILY
Qty: 90 TABLET | Refills: 3 | Status: SHIPPED | OUTPATIENT
Start: 2024-04-10

## 2024-04-10 NOTE — PROGRESS NOTES
"Physical Therapy Daily Treatment Note    [facility]  [address] [place] [address]  [phone] (phone)  [phone] (fax)    Patient: Zoila Delgado   : 1960  Diagnosis/ICD-10 Code:  Status post total right knee replacement [Z96.651]  Referring practitioner: Mariano Yee MD  Today's Date: 2024  Patient seen for 11 sessions    Visit Diagnoses:    ICD-10-CM ICD-9-CM   1. Status post total right knee replacement  Z96.651 V43.65   2. Impaired functional mobility, balance, gait, and endurance  Z74.09 V49.89   3. Weakness of right lower extremity  R29.898 729.89   4. Stiffness of right knee  M25.661 719.56   5. Abnormal gait  R26.9 781.2              Subjective   Pt states she just feels frustrated because she wants things to progress quicker than what they are. She was really active before. She knows that this will take time.     Objective   See Exercise, Manual, and Modality Logs for complete treatment.     Gait:  ambulating w/o AD today - exhibits R flexed knee gait with mild compensation, but overall improving gait mechanics      R Knee AROM:     Pre-stretchin degrees flex, 4 degrees ext   After MT / stretching  3-deg extension      Manual Therapy:   Edema mobilization R knee  Extensive scar mobilization techniques  DTM to Scar/Quad tendon during Knee flex stretch   DTM to Posterior knee w/ knee ext stretch  Gentle knee flexion / extension with manual assist - Prolonged holds and DTM *defer   Posterior oscillations to femur in extension, grade 3-4, 20 oscillations x5     EXERCISES:   NuStep x 10 min L6 - on own, prior to sessiom  Knee Flexion stretch on step 5/30 sec ea  SAQ x 10/5 sec ea w/ NMR to facil VMO *defer  LAQ x10/5 sec   Airdyne Bike (seat 4) Fwd (full revolutions) x 6 min  Trial of 2\" step ups R LE - Noted caution / apprehension and difficulty without UE support--noted push off with leg on ground VERSUS pushing UP with R knee. " "Worked on staggered stance mini lunge with R leg in front to mimic going up steps x5  SLS 45 sec B  Mini squat on BOSU 45 sec         Education:  Jt protection, ADL modification, Symptom management, Posture and Body mechanics *defer    Assessment/Plan  Pt tolerated session well overall, reporting an appropriate level of muscle fatigue following strength exercises. Continued most of previously initiated exercises. Focused on step ups this session. Pt was pushing off from LE on ground (rather than pushing up from RLE on step). Performed a few \"lunges\" on level ground (starting with R knee bent, extending knee with weight on front leg) to promote more comfort with motion. Continue PT POC.          Timed:    Manual Therapy:    20     mins  43774;  Therapeutic Exercise:    8     mins  48144;     Neuromuscular Ema:    0    mins  03586;    Therapeutic Activity:     15     mins  42284;     Gait Trainin     mins  63457;     Ultrasound:     0     mins  29377;    Aquatic Therapy    0     mins 83440;  Self Care                       0     mins   81736        Untimed:  Electrical Stimulation:    0     mins  70792 ( );  Traction:    0     mins  91500;   Dry Needling  (1-2 muscles)            0     mins 56892 (Self-pay)  Dry Needling (3-4 muscles) 0     34605 (Self-pay)  Dry Needling Trial    0     DRYNDLTRIAL  (No Charge)    Timed Treatment:   43   mins   Total Treatment:     43   mins    Jaci Luo PT  Physical Therapist    [place] License:[id number]  "

## 2024-04-12 ENCOUNTER — TREATMENT (OUTPATIENT)
Dept: PHYSICAL THERAPY | Facility: CLINIC | Age: 64
End: 2024-04-12
Payer: MEDICARE

## 2024-04-12 DIAGNOSIS — M25.661 STIFFNESS OF RIGHT KNEE: ICD-10-CM

## 2024-04-12 DIAGNOSIS — Z74.09 IMPAIRED FUNCTIONAL MOBILITY, BALANCE, GAIT, AND ENDURANCE: ICD-10-CM

## 2024-04-12 DIAGNOSIS — Z96.651 STATUS POST TOTAL RIGHT KNEE REPLACEMENT: Primary | ICD-10-CM

## 2024-04-12 DIAGNOSIS — R26.9 ABNORMAL GAIT: ICD-10-CM

## 2024-04-12 DIAGNOSIS — R29.898 WEAKNESS OF RIGHT LOWER EXTREMITY: ICD-10-CM

## 2024-04-15 ENCOUNTER — TREATMENT (OUTPATIENT)
Dept: PHYSICAL THERAPY | Facility: CLINIC | Age: 64
End: 2024-04-15
Payer: MEDICARE

## 2024-04-15 DIAGNOSIS — M25.661 STIFFNESS OF RIGHT KNEE: ICD-10-CM

## 2024-04-15 DIAGNOSIS — R26.9 ABNORMAL GAIT: ICD-10-CM

## 2024-04-15 DIAGNOSIS — R29.898 WEAKNESS OF RIGHT LOWER EXTREMITY: ICD-10-CM

## 2024-04-15 DIAGNOSIS — Z74.09 IMPAIRED FUNCTIONAL MOBILITY, BALANCE, GAIT, AND ENDURANCE: ICD-10-CM

## 2024-04-15 DIAGNOSIS — Z96.651 STATUS POST TOTAL RIGHT KNEE REPLACEMENT: Primary | ICD-10-CM

## 2024-04-15 PROCEDURE — 97530 THERAPEUTIC ACTIVITIES: CPT | Performed by: PHYSICAL THERAPIST

## 2024-04-15 PROCEDURE — 97112 NEUROMUSCULAR REEDUCATION: CPT | Performed by: PHYSICAL THERAPIST

## 2024-04-15 PROCEDURE — 97110 THERAPEUTIC EXERCISES: CPT | Performed by: PHYSICAL THERAPIST

## 2024-04-15 NOTE — PROGRESS NOTES
Physical Therapy Daily Note    Patient Name: Zoila Delgado         :  1960  Referring Physician: Mariano Yee MD  Treatment Date: 4/15/2024  Date of Initial Visit: Type: THERAPY  Noted: 3/14/2024    Visit Diagnoses:    ICD-10-CM ICD-9-CM   1. Status post total right knee replacement  Z96.651 V43.65   2. Weakness of right lower extremity  R29.898 729.89   3. Impaired functional mobility, balance, gait, and endurance  Z74.09 V49.89   4. Stiffness of right knee  M25.661 719.56   5. Abnormal gait  R26.9 781.2     Patient seen for 12 sessions  _____________________________________________________    Subjective   Zoila Delgado reports: aching less at night - able to lay flat at night w/o pain - Improved mobility   Anterior knee pain w/ attempting to straighten her knee -   Stretching and doing scar work a lot at home and riding bike at home -     Objective   Pt ambulating w/o AD - flexed (R) knee -   AROM: 10-deg Extension w/ anterior knee pain w/ OP into extension            132-deg FLEXION!   After taping to unload PF jt allowed for increased knee extension w/o pain and only stretch posterior knee -   Proximal scar very tight / tender, but not as severe as before -   Sig atrophy of VMO w/ QS and SAQ, w/ anterior knee pain :  AFTER TAPING TO UNLOAD PF JT - Pt DEMONSTRATED IMPROVED QS/VMO facilitation and improved SAQ and FAQ w/o pain     TREATMENT:   MANUAL THERAPY:   EDEMA MOBILIZATION (R) KNEE  EXTENSIVE SCAR MOBILIZATION TECHNIQUES  DTM to Scar/QUAD TENDON, Quad w/ KNEE FLEXION STRETCH w/ MFR  DTM to Posterior knee w/ knee ext stretch (After taping to unload PF jt) with prone knee ext stretch  GENTLE MANUALLY ASSISTED KNEE STRETCHING into Ext w/ Prolonged holds and DTM      EXERCISES:   NUSTEP x 10 min L6  PRONE KNEE EXTENSION STRETCH w/ PROLONGED HOLDS 5-8 min   SAQ after Taping x 15/5 sec ea w/ NMR to facil VMO and emphasis on ECC lowering  SUPINE ISOMETRIC LEG PRESS w/ ADD SET w/ FEET ON BALL 20/5 sec  ea (B) and (R)  SUPINE HS CURL w/ FLEX / EXT Stretch x 20 w/ emphasis on core -   BRIDGES w/ LEGS ON BALL x 20   FAQ w/ P-H into FULL EXTENSION and SLOW ECC LOWERING -   Gait work 1 lap track after taping to assess gait and facilitate knee extension / quad firing   REC BIKE seat 5 Fwd/Retro (full revolutions) x 10 min - AT HOME     NMR: Verbal and tactile cues to facilitate VMO/Quad musculature statically and dynamically. - -         Functional / Therapeutic Activities:    TAPING / BRACING: K-tape to unload PF jt to reduce pain and allow improved knee extension, gait and quad function -   SEE EXERCISE FLOW SHEET -     Assessment/Plan  65 yo female s/p (R) TKA 3- -   PF pain preventing knee extension, gait and proper quad / vmo firing -       Pt demonstrated improved VMO/Quad firing and able to perform SAQ after taping - Pt demonstrated improved gait w/ much improved knee extension after taping -   Much improved AROM after MT w/o pain -      Scar very painful / tender/tight and painful with MT, but very helpful     Progress strengthening /stabilization /functional activity       _________________________________________________    Manual Therapy:            05     mins  41797;  Therapeutic Exercise:    15    mins  74469;     Neuromuscular Ema:   08     mins  33053;    Therapeutic Activity:     20      mins  35489;     Ultrasound:                          mins  26537;  Iontophoresis:                     mins  11017;    Electrical Stimulation:         mins  87003 ( );  Mechanical Traction:          mins  36423  Dry Needling                       mins self-pay     Timed Treatment:   48    mins                  Total Treatment:     48    mins        Russ Torrez PT  Physical Therapist  Lic # [id number]

## 2024-04-18 ENCOUNTER — TREATMENT (OUTPATIENT)
Dept: PHYSICAL THERAPY | Facility: CLINIC | Age: 64
End: 2024-04-18
Payer: MEDICARE

## 2024-04-18 DIAGNOSIS — Z96.651 STATUS POST TOTAL RIGHT KNEE REPLACEMENT: Primary | ICD-10-CM

## 2024-04-18 DIAGNOSIS — R26.9 ABNORMAL GAIT: ICD-10-CM

## 2024-04-18 DIAGNOSIS — R29.898 WEAKNESS OF RIGHT LOWER EXTREMITY: ICD-10-CM

## 2024-04-18 DIAGNOSIS — Z74.09 IMPAIRED FUNCTIONAL MOBILITY, BALANCE, GAIT, AND ENDURANCE: ICD-10-CM

## 2024-04-18 DIAGNOSIS — M25.661 STIFFNESS OF RIGHT KNEE: ICD-10-CM

## 2024-04-18 NOTE — PROGRESS NOTES
Physical Therapy Daily Note    Patient Name: Zoila Delgado         :  1960  Referring Physician: Mairano Yee MD  Treatment Date: 2024  Date of Initial Visit: Type: THERAPY  Noted: 3/14/2024    Visit Diagnoses:    ICD-10-CM ICD-9-CM   1. Status post total right knee replacement  Z96.651 V43.65   2. Weakness of right lower extremity  R29.898 729.89   3. Impaired functional mobility, balance, gait, and endurance  Z74.09 V49.89   4. Stiffness of right knee  M25.661 719.56   5. Abnormal gait  R26.9 781.2     Patient seen for 13 sessions  _____________________________________________________    Subjective   Zoila Delgado reports: pain lateral knee/proximal lateral lower leg (Pointing to prox fib region) with attempting full knee extension and with knee flexion and walking -     Objective   Very tender at proximal tib-fib joint and painfully hypermobile -   132-deg flexion;  Extension limited due to PF and Prx tib-fib region pain -     TREATMENT:   MANUAL THERAPY:   EDEMA MOBILIZATION (R) KNEE  EXTENSIVE SCAR MOBILIZATION TECHNIQUES  DTM to Scar/QUAD TENDON, Quad w/ KNEE FLEXION STRETCH w/ MFR  DTM to Posterior knee w/ knee ext stretch (After taping to unload PF jt) with prone knee ext stretch  GENTLE MANUALLY ASSISTED KNEE STRETCHING into Ext w/ Prolonged holds and DTM      EXERCISES:   NUSTEP x 10 min L6  PRONE KNEE EXTENSION STRETCH w/ PROLONGED HOLDS 5-8 min   SAQ after Taping x 15/5 sec ea w/ NMR to facil VMO and emphasis on ECC lowering  SUPINE ISOMETRIC LEG PRESS w/ ADD SET w/ FEET ON BALL 20/5 sec ea (B) and (R)  SUPINE HS CURL w/ FLEX / EXT Stretch x 20 w/ emphasis on core -   BRIDGES w/ LEGS ON BALL x 20   FAQ w/ P-H into FULL EXTENSION and SLOW ECC LOWERING -   Gait work 1 lap track after taping to assess gait and facilitate knee extension / quad firing   REC BIKE seat 5 Fwd/Retro (full revolutions) x 10 min - AT HOME     NMR: Verbal and tactile cues to facilitate VMO/Quad musculature  statically and dynamically. - -   [place] STIM to FACILITATE VMO/QUAD (R) - x 15 min 10/20sec        Functional / Therapeutic Activities:    TAPING / BRACING: K-tape to unload PF jt to reduce pain and allow improved knee extension, gait and quad function - HELD  Assessment of Proximal tib-fib joint -   TRIAL OF TAPING TO STABILIZE PROXIMAL TIB-FIB JOINT - NO HELP  SEE EXERCISE FLOW SHEET -     Assessment/Plan  65 yo female s/p (R) TKA 3- -   PF pain preventing knee extension, gait and proper quad / vmo firing -       Pt demonstrated improved VMO/Quad firing and able to perform SAQ after taping - Pt demonstrated improved gait w/ much improved knee extension after taping -   Much improved AROM after MT w/o pain -      HYPERMOBILE PROXIMAL TIB-FIB joint painful with full knee extension and ambulation (tape no help)     Progress strengthening /stabilization /functional activity       _________________________________________________    Manual Therapy:                 mins  71936;  Therapeutic Exercise:    15    mins  72245;     Neuromuscular Ema:   18    mins  96906;    Therapeutic Activity:     20      mins  41952;     Ultrasound:                          mins  92499;  Iontophoresis:                     mins  99622;    Electrical Stimulation:         mins  88928 ( );  Mechanical Traction:          mins  15221  Dry Needling                       mins self-pay     Timed Treatment:   53    mins                  Total Treatment:     53   mins        Russ Torrez PT  Physical Therapist  Lic # [id number]

## 2024-04-22 ENCOUNTER — TREATMENT (OUTPATIENT)
Dept: PHYSICAL THERAPY | Facility: CLINIC | Age: 64
End: 2024-04-22
Payer: MEDICARE

## 2024-04-22 DIAGNOSIS — R26.9 ABNORMAL GAIT: ICD-10-CM

## 2024-04-22 DIAGNOSIS — R29.898 WEAKNESS OF RIGHT LOWER EXTREMITY: ICD-10-CM

## 2024-04-22 DIAGNOSIS — M25.661 STIFFNESS OF RIGHT KNEE: ICD-10-CM

## 2024-04-22 DIAGNOSIS — Z96.651 STATUS POST TOTAL RIGHT KNEE REPLACEMENT: Primary | ICD-10-CM

## 2024-04-22 DIAGNOSIS — Z74.09 IMPAIRED FUNCTIONAL MOBILITY, BALANCE, GAIT, AND ENDURANCE: ICD-10-CM

## 2024-04-22 PROCEDURE — 97140 MANUAL THERAPY 1/> REGIONS: CPT | Performed by: PHYSICAL THERAPIST

## 2024-04-22 PROCEDURE — 97110 THERAPEUTIC EXERCISES: CPT | Performed by: PHYSICAL THERAPIST

## 2024-04-22 PROCEDURE — 97112 NEUROMUSCULAR REEDUCATION: CPT | Performed by: PHYSICAL THERAPIST

## 2024-04-22 PROCEDURE — 97530 THERAPEUTIC ACTIVITIES: CPT | Performed by: PHYSICAL THERAPIST

## 2024-04-22 NOTE — PROGRESS NOTES
Physical Therapy Daily Note    Patient Name: Zoila Delgado         :  1960  Referring Physician: Mariano Yee MD  Treatment Date: 2024  Date of Initial Visit: Type: THERAPY  Noted: 3/14/2024    Visit Diagnoses:    ICD-10-CM ICD-9-CM   1. Status post total right knee replacement  Z96.651 V43.65   2. Weakness of right lower extremity  R29.898 729.89   3. Impaired functional mobility, balance, gait, and endurance  Z74.09 V49.89   4. Stiffness of right knee  M25.661 719.56   5. Abnormal gait  R26.9 781.2     Patient seen for 14 sessions  _____________________________________________________    Subjective   Zoila Delgado reports: painful yesterday (out all day Sat at Soccer - stood up for first game, but sat for the other  2 games - )     Objective   Very tender along entire ITB w/ painful TPs into insertion - (R) LE  Near full knee extension w/ less anterior pain - Flexion up to 130-132-deg;  Proximal scar very tight and tender - improved after DTM/MFR/STRETCHING -   Very tender at lateral HS insertion -   Pt unable to perform supine SAQ due to severe PF Jt pain -     TREATMENT:   MANUAL THERAPY:   EDEMA MOBILIZATION (R) KNEE  EXTENSIVE SCAR MOBILIZATION TECHNIQUES  DTM to Scar/QUAD TENDON, Quad w/ KNEE FLEXION STRETCH w/ MFR  GENTLE MANUALLY ASSISTED KNEE STRETCHING into Ext w/ Prolonged holds and DTM      EXERCISES:   NUSTEP x 10 min L6  PRONE KNEE EXTENSION STRETCH w/ PROLONGED HOLDS 5-8 min   SAQ  x 15/5 sec ea w/ PH / NMR to facil VMO and emphasis on ECC lowering  (TOO PAINFUL TODAY)  SUPINE ISOMETRIC LEG PRESS w/ ADD SET w/ FEET ON BALL 20/5 sec ea (B) and (R) - HOME  SUPINE HS CURL w/ FLEX / EXT Stretch x 20 w/ emphasis on core - HOME  BRIDGES w/ LEGS ON BALL x 20  - HOME  FAQ w/ P-H into FULL EXTENSION and SLOW ECC LOWERING - DEFERRED TODAY  STANDING TKE w/ band (GREEN) x 20 w / much cuing for proper facilitation of quad / Glute    RUNNER STEP UP Hole 2 from bottom x 20  LATERAL STEP UP w/  ABDuction Hole 2 from bottomx 20   SEVERIANO LEG PRESS:   SLS holding on for glute medius facilitation   Gait work 1 lap track after taping to assess gait and facilitate knee extension / quad firing  OCTANE:  SEAT 13   Fwd/RETRO  10 min (5/5)  L7  (cuing to focus on (R) quad firing/facilitation)  REC BIKE seat 5 Fwd/Retro (full revolutions) x 10 min - AT HOME     NMR: Verbal and tactile cues to facilitate VMO/Quad musculature statically and dynamically. - -   Including [place] STIM to FACILITATE VMO/QUAD (R) - x 15 min 10/20sec        Functional / Therapeutic Activities:    TAPING / BRACING: K-tape to stabilize lateral HS insertion at proximal tib   SEE EXERCISEs    Assessment/Plan  63 yo female s/p (R) TKA 3- -   PF pain preventing knee extension, gait and proper quad / vmo firing -       Pt demonstrated improved VMO/Quad firing and able to perform SAQ after taping - Pt demonstrated improved gait w/ much improved knee extension after taping -   Much improved AROM after MT w/o pain -      HYPERMOBILE PROXIMAL TIB-FIB joint painful with full knee extension and ambulation (tape no help)     Progress strengthening /stabilization /functional activity       _________________________________________________    Manual Therapy:            15     mins  87228;  Therapeutic Exercise:    05    mins  15568;     Neuromuscular Ema:   20     mins  47263;    Therapeutic Activity:     20      mins  23115;     Ultrasound:                          mins  49685;  Iontophoresis:                     mins  04320;    Electrical Stimulation:         mins  11182 ( );  Mechanical Traction:          mins  90158  Dry Needling                       mins self-pay     Timed Treatment:   60    mins                  Total Treatment:     60    mins        Russ Torrez PT  Physical Therapist  Lic # [id number]

## 2024-04-24 ENCOUNTER — TREATMENT (OUTPATIENT)
Dept: PHYSICAL THERAPY | Facility: CLINIC | Age: 64
End: 2024-04-24
Payer: MEDICARE

## 2024-04-24 DIAGNOSIS — Z96.651 STATUS POST TOTAL RIGHT KNEE REPLACEMENT: Primary | ICD-10-CM

## 2024-04-24 DIAGNOSIS — Z74.09 IMPAIRED FUNCTIONAL MOBILITY, BALANCE, GAIT, AND ENDURANCE: ICD-10-CM

## 2024-04-24 DIAGNOSIS — R29.898 WEAKNESS OF RIGHT LOWER EXTREMITY: ICD-10-CM

## 2024-04-24 DIAGNOSIS — M25.661 STIFFNESS OF RIGHT KNEE: ICD-10-CM

## 2024-04-24 DIAGNOSIS — R26.9 ABNORMAL GAIT: ICD-10-CM

## 2024-04-24 PROCEDURE — 97112 NEUROMUSCULAR REEDUCATION: CPT | Performed by: PHYSICAL THERAPIST

## 2024-04-24 PROCEDURE — 97530 THERAPEUTIC ACTIVITIES: CPT | Performed by: PHYSICAL THERAPIST

## 2024-04-24 PROCEDURE — 97140 MANUAL THERAPY 1/> REGIONS: CPT | Performed by: PHYSICAL THERAPIST

## 2024-04-24 PROCEDURE — 97110 THERAPEUTIC EXERCISES: CPT | Performed by: PHYSICAL THERAPIST

## 2024-04-24 NOTE — PROGRESS NOTES
Physical Therapy Daily Note  RE-ASSESSMENT    Patient Name: Zoila Delgado         :  1960  Referring Physician: Mariano Yee MD  Treatment Date: 2024  Date of Initial Visit: Type: THERAPY  Noted: 3/14/2024    Visit Diagnoses:    ICD-10-CM ICD-9-CM   1. Status post total right knee replacement  Z96.651 V43.65   2. Weakness of right lower extremity  R29.898 729.89   3. Impaired functional mobility, balance, gait, and endurance  Z74.09 V49.89   4. Stiffness of right knee  M25.661 719.56   5. Abnormal gait  R26.9 781.2     Patient seen for 15 sessions  _____________________________________________________    Subjective   Zoila Delgado reports: sore from riding bike x 20 min twice and walking a mile yesterday - Ice helped - Noting improved mobility / extension -   C/O Anterior knee / Patellar-fem pain with full active knee extension -   Pain lateral knee w/ full knee extension-   To see Dr. Yee tomorrow -   -SEE MD PROGRESS NOTE-      Objective   Pt ambulating w/ flexed knee posture, but improved - w/o AD -   Atrophy of (R) Quad / VMO, but Improved firing of Quad / VMO (R) -  Improved quad control w/ closed chain activities -   Patellar-fem pain prevents open chain TKE -     Tender at distal ITB and up distal 2/3 ITB w/ painful TPs (Improved from last session)    AROM: (R) KNEE:  Near full knee extension to 135-deg Flexion    -SEE MD PROGRESS NOTE-      TREATMENT:   MANUAL THERAPY:   EDEMA MOBILIZATION (R) KNEE  EXTENSIVE SCAR MOBILIZATION TECHNIQUES  DTM to Scar/QUAD TENDON, Quad w/ KNEE FLEXION STRETCH w/ MFR  GENTLE MANUALLY ASSISTED KNEE STRETCHING into Ext w/ Prolonged holds and DTM      EXERCISES:   NUSTEP x 10 min L6  STANDING TKE w/ band (BLUE) x 20 w / much cuing for proper facilitation of quad / Glute    RUNNER STEP UP Hole 3 from bottom x 20  LATERAL STEP UP w/ ABDuction Hole 2 from bottom x 20   SEVERIANO LEG PRESS: 100#  SLS holding on for glute medius facilitation   Gait work 1 lap track  after taping to assess gait and facilitate knee extension / quad firing  OCTANE:  SEAT 13   Fwd/RETRO  10 min (5/5)  L7  (cuing to focus on (R) quad firing/facilitation)       NMR: Verbal and tactile cues to facilitate VMO/Quad musculature statically and dynamically. - -   Including Ug[place] STIM to FACILITATE VMO/QUAD (R) - x 15 min 15/20sec (2 sec Ramp)     Functional / Therapeutic Activities:    TAPING / BRACING: NA  Set up MM Stim unit for Patient -  Insturcted Pt in use of home MM STIM Unit for VMO/Quad facilitation -   KNEE FUNCTIONAL RE-ASSESSMENT -     Assessment/Plan  65 yo female s/p (R) TKA 3- -   Pat-Fem pain preventing terminal knee extension, gait and proper quad / vmo firing -   Improved Quad / VMO firing w/ Muscle stimulation / NMR -   Very good ROM - improved gait and function -   ITB pain improved w/ manual therapy -   -SEE MD PROGRESS NOTE-        Progress strengthening /stabilization /functional activity  -SEE MD PROGRESS NOTE-         _________________________________________________    Manual Therapy:            05     mins  92583;  Therapeutic Exercise:    08    mins  22901;     Neuromuscular Ema:   20     mins  83237;    Therapeutic Activity:     20      mins  96764;     Ultrasound:                          mins  76035;  Iontophoresis:                     mins  07265;    Electrical Stimulation:         mins  68856 ( );  Mechanical Traction:          mins  71244  Dry Needling                       mins self-pay     Timed Treatment:   53    mins                  Total Treatment:     53    mins        Russ Torrez PT  Physical Therapist  Lic # [id number]

## 2024-04-24 NOTE — LETTER
Milestone   ------------------------------------------------------------------------------------------------------   MD PROGRESS NOTE    Patient: Zoila Delgado        : 1960  Diagnosis/ICD-10 Code:  Status post total right knee replacement [Z96.651]  Referring practitioner: Mariano Yee MD  Date of Initial Visit: 3/14/2024                   Today's Date: 2024  _________________________________________________________________    Thank you for the referral of Ms. Delgado to [facility] Physical Therapy.  Ms. Delgado has attended 15 PT sessions and their treatment has consisted of: modalities prn, manual therapy, therapeutic exercise, taping, Togolese muscle stimulation / NMR, patient education, and HEP.     Subjective   Zoila Delgado reports: Noting improved mobility / extension - C/O Anterior knee / Patellar-fem pain with full active knee extension - Pain lateral knee w/ full knee extension, but improved -   Walked a mile yesterday -   ___________________________________________________________________    Objective              OBSERVATION: Pt ambulating w/ flexed knee posture, but improved - w/o AD -   Atrophy of (R) Quad / VMO, but Improved firing of Quad / VMO (R) - Improved quad control w/ closed chain activities - Patellar-fem pain prevents open chain TKE -               PALPATION: Tender at distal ITB and up distal 2/3 ITB w/ painful TPs (Improved from last session) - Proximal scar tight and hypersensitive, but improving - Very tender at Proximal tib-fib joint (R) -         AROM: (R) KNEE: Near full knee extension to 135-deg Flexion    STRENGTH: Improved quad control w/ closed chain activities -    ACTIVITY TOLERANCE: improved tolerance to walking / ADLs, but Patellar pain limits ADLs and tolerance to PT activities -    ___________________________________________________________________     Assessment/Plan  63 yo female s/p (R) TKA 3- -   PF pain preventing knee extension, gait and  proper quad / vmo firing  - Patellar taping helpful in reducing pain, but Gabrielle cannot tolerate the tape.-  Improved Quad / VMO firing w/ Muscle stimulation / NMR -   Very good ROM - improved gait and function -   ITB pain improved w/ manual therapy -   Ms. Delgado would benefit from continued Physical Therapy.                     P: Recommend continued Physical Therapy to allow a full and safe return to ADL's and normal job duties   2 times/wk x 6 weeks.    Please advise after your exam.    Thank you again for this referral of Ms. Delgado to [facility] Physical Therapy.    PT Signature: ______________________________   Russ Torrez, PT  ______________________________________________________  Based upon review of the patient's progress and continued therapy plan, it is my medical opinion that Zoila Delgado should continue physical therapy treatment per the recommendation above.     Signature: ____________________________   Date: ______    Mariano Yee MD

## 2024-04-24 NOTE — PROGRESS NOTES
Milestone   ------------------------------------------------------------------------------------------------------   MD PROGRESS NOTE    Patient: Zoila Delgado        : 1960  Diagnosis/ICD-10 Code:  Status post total right knee replacement [Z96.651]  Referring practitioner: Mariano Yee MD  Date of Initial Visit: 3/14/2024                   Today's Date: 2024  _________________________________________________________________    Thank you for the referral of Ms. Delgado to [facility] Physical Therapy.  Ms. Delgado has attended 15 PT sessions and their treatment has consisted of: modalities prn, manual therapy, therapeutic exercise, taping, Citizen of [place] muscle stimulation / NMR, patient education, and HEP.     Subjective   Zoila Delgado reports: Noting improved mobility / extension - C/O Anterior knee / Patellar-fem pain with full active knee extension - Pain lateral knee w/ full knee extension, but improved -   Walked a mile yesterday -   ___________________________________________________________________  Objective              OBSERVATION: Pt ambulating w/ flexed knee posture, but improved - w/o AD -   Atrophy of (R) Quad / VMO, but Improved firing of Quad / VMO (R) - Improved quad control w/ closed chain activities - Patellar-fem pain prevents open chain TKE -               PALPATION: Tender at distal ITB and up distal 2/3 ITB w/ painful TPs (Improved from last session) - Proximal scar tight and hypersensitive, but improving - Very tender at Proximal tib-fib joint (R) -         AROM: (R) KNEE: Near full knee extension to 135-deg Flexion    STRENGTH: Improved quad control w/ closed chain activities -    ACTIVITY TOLERANCE: improved tolerance to walking / ADLs, but Patellar pain limits ADLs and tolerance to PT activities -    ___________________________________________________________________   Assessment/Plan  65 yo female s/p (R) TKA 3- -   PF pain preventing knee extension, gait and proper  quad / vmo firing  - Patellar taping helpful in reducing pain, but Gabrielle cannot tolerate the tape.-  Improved Quad / VMO firing w/ Muscle stimulation / NMR -   Very good ROM - improved gait and function -   ITB pain improved w/ manual therapy -   Ms. Delgado would benefit from continued Physical Therapy.     P: Recommend continued Physical Therapy to allow a full and safe return to ADL's and normal job duties   2 times/wk x 6 weeks.    Please advise after your exam.    Thank you again for this referral of Ms. Delgado to [facility] Physical Therapy.    PT Signature: ______________________________   Russ Torrez, PT  ______________________________________________________  Based upon review of the patient's progress and continued therapy plan, it is my medical opinion that Zoial Delgado should continue physical therapy treatment per the recommendation above.     Signature: ____________________________   Date: ______    Mariano Yee MD  ______________________________________________________________________  [address] [place] [address]  Phone: [phone] Fax: [phone]

## 2024-04-25 ENCOUNTER — OFFICE VISIT (OUTPATIENT)
Dept: ORTHOPEDIC SURGERY | Facility: CLINIC | Age: 64
End: 2024-04-25
Payer: MEDICARE

## 2024-04-25 VITALS — HEIGHT: 66 IN | BODY MASS INDEX: 20.25 KG/M2 | WEIGHT: 126 LBS

## 2024-04-25 DIAGNOSIS — Z96.651 STATUS POST TOTAL RIGHT KNEE REPLACEMENT: Primary | ICD-10-CM

## 2024-04-25 PROCEDURE — 1159F MED LIST DOCD IN RCRD: CPT | Performed by: ORTHOPAEDIC SURGERY

## 2024-04-25 PROCEDURE — 1160F RVW MEDS BY RX/DR IN RCRD: CPT | Performed by: ORTHOPAEDIC SURGERY

## 2024-04-25 PROCEDURE — 99024 POSTOP FOLLOW-UP VISIT: CPT | Performed by: ORTHOPAEDIC SURGERY

## 2024-04-25 RX ORDER — CEPHALEXIN 500 MG/1
CAPSULE ORAL
Qty: 4 CAPSULE | Refills: 2 | Status: SHIPPED | OUTPATIENT
Start: 2024-04-25

## 2024-04-25 NOTE — PROGRESS NOTES
CC: s/p right total knee arthroplasty, DOS 3/12/2024  Interval History: Zoila Delgado returns for 6 week postoperative visit.  She is doing well. Pain is controlled with pain medication and is  improving. She denies any wound problem, fevers, or chills. Patient is continuing to work with outpatient PT. Ambulating without cane.     Physical Examination: Right knee was examined   Incision well healed   ROM 0-135,  4+/5 strength   Stable to varus and valgus stress   Flex/extend ankle and toes   Positive sensation right foot   No calf pain, negative Homans sign bilaterally    Radiographic review: Radiographs of the right knee 3 views, AP, lateral and patella axial views, compared to immediate postop films, indication s/p TKA,  shows that the implant is in good position. There is no evidence of implant loosening or osteolysis, no dislocation or periprosthetic fractures. Overall alignment acceptable at this time.     Assessment/Plan:  Zoila Delgado is recovering from surgery as expected.  We will continue outpatient therapy for range of motion, strengthening, and gait normalization. .  She is to follow up in clinic in 6 weeks with no xrays. Patient had all question answered today. Discussed need for prophylactic antibiotics with dental/endoscopy procedures.     Medications:  No orders of the defined types were placed in this encounter.      Mariano Yee MD

## 2024-04-29 ENCOUNTER — TREATMENT (OUTPATIENT)
Dept: PHYSICAL THERAPY | Facility: CLINIC | Age: 64
End: 2024-04-29
Payer: MEDICARE

## 2024-04-29 DIAGNOSIS — R26.9 ABNORMAL GAIT: ICD-10-CM

## 2024-04-29 DIAGNOSIS — Z74.09 IMPAIRED FUNCTIONAL MOBILITY, BALANCE, GAIT, AND ENDURANCE: ICD-10-CM

## 2024-04-29 DIAGNOSIS — M25.661 STIFFNESS OF RIGHT KNEE: ICD-10-CM

## 2024-04-29 DIAGNOSIS — R29.898 WEAKNESS OF RIGHT LOWER EXTREMITY: ICD-10-CM

## 2024-04-29 DIAGNOSIS — Z96.651 STATUS POST TOTAL RIGHT KNEE REPLACEMENT: Primary | ICD-10-CM

## 2024-04-29 PROCEDURE — 97110 THERAPEUTIC EXERCISES: CPT | Performed by: PHYSICAL THERAPIST

## 2024-04-29 PROCEDURE — 97530 THERAPEUTIC ACTIVITIES: CPT | Performed by: PHYSICAL THERAPIST

## 2024-04-29 PROCEDURE — 97112 NEUROMUSCULAR REEDUCATION: CPT | Performed by: PHYSICAL THERAPIST

## 2024-04-29 NOTE — PROGRESS NOTES
Physical Therapy Daily Note    Patient Name: Zoila Delgado         :  1960  Referring Physician: Mariano Yee MD  Treatment Date: 2024  Date of Initial Visit: No linked episodes    Visit Diagnoses:    ICD-10-CM ICD-9-CM   1. Status post total right knee replacement  Z96.651 V43.65   2. Weakness of right lower extremity  R29.898 729.89   3. Impaired functional mobility, balance, gait, and endurance  Z74.09 V49.89   4. Stiffness of right knee  M25.661 719.56   5. Abnormal gait  R26.9 781.2     Patient seen for Visit count could not be calculated. Make sure you are using a visit which is associated with an episode. sessions  _____________________________________________________    Subjective   Zoila Delgado reports: saw Dr. Yee - very pleased with progress - shown picture of how bad her cartilage wear was in surgery and told the same about her knee cap and had a lateral release as it was sitting laterally at rest - so told her knee cap pain is exptected and should get better - told to exercises 0-90-deg - other than Rom exercises -   C/O increased (L) LBP     Objective   (+) SI Jt Dysfunction (L) [place] / Post rotation ilium (L)  Pain PF jt w/ SAQ/TKE   Improved firing of quad / VMO (R) -     TREATMENT:   MANUAL THERAPY: (DEFER TODAY)  EDEMA MOBILIZATION (R) KNEE  EXTENSIVE SCAR MOBILIZATION TECHNIQUES  DTM to Scar/QUAD TENDON, Quad w/ KNEE FLEXION STRETCH w/ MFR  GENTLE MANUALLY ASSISTED KNEE STRETCHING into Ext w/ Prolonged holds and DTM      EXERCISES:   NUSTEP x 10 min L6  STANDING TKE w/ band (BLUE) x 20 w / much cuing for proper facilitation of quad / Glute    RUNNER STEP UP Hole 3 from bottom x 20  LATERAL STEP UP w/ ABDuction Hole 2 from bottom x 20   SEVERIANO LEG PRESS: 100# w/ Flexion stretch 15  SLS holding on for glute medius facilitation   Gait work 1 lap track after taping to assess gait and facilitate knee extension / quad firing  OCTANE:  SEAT 13   Fwd/RETRO  10 min (5/5)  L7  (cuing  to focus on (R) quad firing/facilitation)  SL QL Stretch (L) 2x 2 min   Supine Post rotation stretch (L) 2x2 min ea        NMR: Verbal and tactile cues to facilitate VMO/Quad musculature statically and dynamically. - -   Including Martiniquais STIM to FACILITATE VMO/QUAD (R) - x 15 min 15/20sec (2 sec Ramp)     Functional / Therapeutic Activities:    TAPING / BRACING: K-tape to UNLOAD PF jt (R) -   SEE EXERCISE FLOW SHEET -   LB/SI jt assessment     Assessment/Plan  63 yo female s/p (R) TKA 3- -   Pat-Fem pain preventing terminal knee extension, gait and proper quad / vmo firing -   Improved Quad / VMO firing w/ Muscle stimulation / NMR -   Very good ROM - improved gait and function -     LB/SI jt dysfunction -     Progress strengthening /stabilization /functional activity       _________________________________________________    Manual Therapy:                 mins  67242;  Therapeutic Exercise:    18    mins  87924;     Neuromuscular Ema:   05     mins  43421;    Therapeutic Activity:     20      mins  35346;     Ultrasound:                          mins  40597;  Iontophoresis:                     mins  47756;    Electrical Stimulation:         mins  05911 ( );  Mechanical Traction:          mins  77718  Dry Needling                       mins self-pay     Timed Treatment:   43    mins                  Total Treatment:     43    mins        Russ Torrez PT  Physical Therapist  Lic # [id number]

## 2024-05-02 ENCOUNTER — TREATMENT (OUTPATIENT)
Dept: PHYSICAL THERAPY | Facility: CLINIC | Age: 64
End: 2024-05-02
Payer: MEDICARE

## 2024-05-02 DIAGNOSIS — R29.898 WEAKNESS OF RIGHT LOWER EXTREMITY: ICD-10-CM

## 2024-05-02 DIAGNOSIS — Z96.651 STATUS POST TOTAL RIGHT KNEE REPLACEMENT: Primary | ICD-10-CM

## 2024-05-02 DIAGNOSIS — M54.50 LUMBAR PAIN: ICD-10-CM

## 2024-05-02 DIAGNOSIS — M25.661 STIFFNESS OF RIGHT KNEE: ICD-10-CM

## 2024-05-02 DIAGNOSIS — Z74.09 IMPAIRED FUNCTIONAL MOBILITY, BALANCE, GAIT, AND ENDURANCE: ICD-10-CM

## 2024-05-02 DIAGNOSIS — R26.9 ABNORMAL GAIT: ICD-10-CM

## 2024-05-02 DIAGNOSIS — M53.3 SACROILIAC JOINT DYSFUNCTION: ICD-10-CM

## 2024-05-02 NOTE — PROGRESS NOTES
Physical Therapy Daily Note    Patient Name: Zoila Delgado         :  1960  Referring Physician: Mariano Yee MD  Treatment Date: 2024  Date of Initial Visit: Type: THERAPY  Noted: 3/14/2024    Visit Diagnoses:    ICD-10-CM ICD-9-CM   1. Status post total right knee replacement  Z96.651 V43.65   2. Weakness of right lower extremity  R29.898 729.89   3. Stiffness of right knee  M25.661 719.56   4. Impaired functional mobility, balance, gait, and endurance  Z74.09 V49.89   5. Abnormal gait  R26.9 781.2   6. Lumbar pain  M54.50 724.2   7. Sacroiliac joint dysfunction  M53.3 724.6     Patient seen for 17 sessions  _____________________________________________________    Subjective   Zoila Delgado reports: LB stretches very helpful - Worked out in yard for about 1.5 hours and noted increased LBP on (L) after -     Objective   Improved quad / VMO firing noted - Grossly increased knee extension actively before pat pain -     EXERCISES:   [x]   NUSTEP x 10 min L6  []   STANDING TKE w/ band (BLUE) x 20 w / much cuing for proper facilitation of quad / Glute    [x]   RUNNER STEP UP Hole 3 from bottom x 20  [x]   LATERAL STEP UP w/ ABDuction Hole 2 from bottom x 20   []   SEVERIANO LEG PRESS: 100# w/ Flexion stretch 15  [x]   SEVERIANO ECC KNEE EXTENSION w/ P-H (R) 5# x 10  []   SLS holding on for glute medius facilitation   []   Gait work 1 lap track after taping to assess gait and facilitate knee extension / quad firing  []   OCTANE:  SEAT 13   Fwd/RETRO  10 min ()  L7  (cuing to focus on (R) quad firing/facilitation)  [x]   SL QL Stretch (L) 2x 2 min   [x]   Supine Post rotation stretch (L) 2x2 min ea  [x]   Fig 4 piriformis stretch 60 sec  [x]   LTR x 1-  [x]   Supine HF stretch off side of bed x 2 min   [x]   TRANSVERSE ABDOMINUS ISOMETRICS 20/10 sec ea  [x]   HL SMALL MARCH w/ CORE STABILIZATION x 2 min  [x]   BRIDGE on HEELS 15/5 sec ea  [x]   SL HIP ABDUCTION 5x 3 rep  [x]   SIDE STEPPING 40 ft x 2 ea  [x]    MONSTER WALK FWD / RETRO 40 ft ea  []           NMR: Verbal and tactile cues to facilitate VMO/Quad musculature statically and dynamically. - - .     Functional / Therapeutic Activities:    TAPING / BRACING: NA  SEE EXERCISE FLOW SHEET -       Assessment/Plan  63 yo female s/p (R) TKA 3- -   Pat-Fem pain preventing terminal knee extension, gait and proper quad / vmo firing -   Improved Quad / VMO firing w/ Muscle stimulation / NMR -   Very good ROM - improved gait and function -      LB/SI jt dysfunction -     Progress strengthening /stabilization /functional activity       _________________________________________________    Manual Therapy:                 mins  80385;  Therapeutic Exercise:    20    mins  39412;     Neuromuscular Ema:   05     mins  20309;    Therapeutic Activity:     20      mins  64176;     Ultrasound:                          mins  41535;  Iontophoresis:                     mins  43526;    Electrical Stimulation:         mins  88712 ( );  Mechanical Traction:          mins  54696  Dry Needling                       mins self-pay     Timed Treatment:   45    mins                  Total Treatment:     45    mins        Russ Torrez PT  Physical Therapist  Lic # [id number]    Access Code: [email or web address]  URL: [email or web address]  Date: 05/02/2024  Prepared by: Shine Torrez    Exercises  - Supine Piriformis Stretch with Foot on Ground  - 3 x daily - 7 x weekly - 1 sets - 5 reps - 30s hold  - Modified Anival Stretch  - 1-2 x daily - 7 x weekly - 1 sets - 2 reps - 1 min hold  - Supine Isometric Transversus Abdominis Contraction  - 1-2 x daily - 7 x weekly - 1 sets - 15-20 reps - 10s hold  - Hooklying Small March  - 1 x daily - 7 x weekly - 1 sets - 2-3 minutes  - Hooklying Gluteal Sets  - 2-3 x daily - 7 x weekly - 1 sets - 20 reps - 10 hold  - SUPINE BRIDGE w/ GLUTEAL Set on heels  - 1 x daily - 7 x weekly - 1-2 sets - 15 reps - 3-5sec hold  - Sidelying Hip Abduction  - 1 x daily - 7 x  weekly - 1-2 sets - 10-15 reps - 3 hold  - Side Stepping with Resistance at Thighs  - 1 x daily - 3-4 x weekly - 1 sets - 3 reps - 30-40 feet - right / left  - Forward Monster Walks  - 1 x daily - 3-4 x weekly - 1 sets - 2-3 reps - 30-40 feet  - Runner's Step Up/Down  - 1 x daily - 3-4 x weekly - 1-2 sets - 15-20 reps - 2-3 hold

## 2024-05-06 ENCOUNTER — TREATMENT (OUTPATIENT)
Dept: PHYSICAL THERAPY | Facility: CLINIC | Age: 64
End: 2024-05-06
Payer: MEDICARE

## 2024-05-06 DIAGNOSIS — R29.898 WEAKNESS OF RIGHT LOWER EXTREMITY: ICD-10-CM

## 2024-05-06 DIAGNOSIS — M54.50 LUMBAR PAIN: ICD-10-CM

## 2024-05-06 DIAGNOSIS — Z74.09 IMPAIRED FUNCTIONAL MOBILITY, BALANCE, GAIT, AND ENDURANCE: ICD-10-CM

## 2024-05-06 DIAGNOSIS — M53.3 SACROILIAC JOINT DYSFUNCTION: ICD-10-CM

## 2024-05-06 DIAGNOSIS — Z96.651 STATUS POST TOTAL RIGHT KNEE REPLACEMENT: Primary | ICD-10-CM

## 2024-05-06 DIAGNOSIS — R26.9 ABNORMAL GAIT: ICD-10-CM

## 2024-05-06 DIAGNOSIS — M25.661 STIFFNESS OF RIGHT KNEE: ICD-10-CM

## 2024-05-06 PROCEDURE — 97530 THERAPEUTIC ACTIVITIES: CPT | Performed by: PHYSICAL THERAPIST

## 2024-05-06 PROCEDURE — 97112 NEUROMUSCULAR REEDUCATION: CPT | Performed by: PHYSICAL THERAPIST

## 2024-05-06 PROCEDURE — 97110 THERAPEUTIC EXERCISES: CPT | Performed by: PHYSICAL THERAPIST

## 2024-05-08 ENCOUNTER — TREATMENT (OUTPATIENT)
Dept: PHYSICAL THERAPY | Facility: CLINIC | Age: 64
End: 2024-05-08
Payer: MEDICARE

## 2024-05-08 DIAGNOSIS — R26.9 ABNORMAL GAIT: ICD-10-CM

## 2024-05-08 DIAGNOSIS — Z96.651 STATUS POST TOTAL RIGHT KNEE REPLACEMENT: Primary | ICD-10-CM

## 2024-05-08 DIAGNOSIS — M25.661 STIFFNESS OF RIGHT KNEE: ICD-10-CM

## 2024-05-08 DIAGNOSIS — Z74.09 IMPAIRED FUNCTIONAL MOBILITY, BALANCE, GAIT, AND ENDURANCE: ICD-10-CM

## 2024-05-08 DIAGNOSIS — R29.898 WEAKNESS OF RIGHT LOWER EXTREMITY: ICD-10-CM

## 2024-05-13 ENCOUNTER — TREATMENT (OUTPATIENT)
Dept: PHYSICAL THERAPY | Facility: CLINIC | Age: 64
End: 2024-05-13
Payer: MEDICARE

## 2024-05-13 DIAGNOSIS — Z96.651 STATUS POST TOTAL RIGHT KNEE REPLACEMENT: Primary | ICD-10-CM

## 2024-05-13 DIAGNOSIS — R26.9 ABNORMAL GAIT: ICD-10-CM

## 2024-05-13 DIAGNOSIS — M53.3 SACROILIAC JOINT DYSFUNCTION: ICD-10-CM

## 2024-05-13 DIAGNOSIS — M25.661 STIFFNESS OF RIGHT KNEE: ICD-10-CM

## 2024-05-13 DIAGNOSIS — M54.50 LUMBAR PAIN: ICD-10-CM

## 2024-05-13 DIAGNOSIS — R29.898 WEAKNESS OF RIGHT LOWER EXTREMITY: ICD-10-CM

## 2024-05-13 DIAGNOSIS — Z74.09 IMPAIRED FUNCTIONAL MOBILITY, BALANCE, GAIT, AND ENDURANCE: ICD-10-CM

## 2024-05-13 PROCEDURE — 97530 THERAPEUTIC ACTIVITIES: CPT | Performed by: PHYSICAL THERAPIST

## 2024-05-13 PROCEDURE — 97112 NEUROMUSCULAR REEDUCATION: CPT | Performed by: PHYSICAL THERAPIST

## 2024-05-13 PROCEDURE — 97110 THERAPEUTIC EXERCISES: CPT | Performed by: PHYSICAL THERAPIST

## 2024-05-13 PROCEDURE — 97140 MANUAL THERAPY 1/> REGIONS: CPT | Performed by: PHYSICAL THERAPIST

## 2024-05-13 NOTE — PROGRESS NOTES
Physical Therapy Daily Note    Patient Name: Ziola Delgado         :  1960  Referring Physician: Mariano Yee MD  Treatment Date: 2024  Date of Initial Visit: Type: THERAPY  Noted: 3/14/2024    Visit Diagnoses:    ICD-10-CM ICD-9-CM   1. Status post total right knee replacement  Z96.651 V43.65   2. Weakness of right lower extremity  R29.898 729.89   3. Stiffness of right knee  M25.661 719.56   4. Impaired functional mobility, balance, gait, and endurance  Z74.09 V49.89   5. Abnormal gait  R26.9 781.2     Patient seen for 20 sessions  _____________________________________________________    Subjective   Zoila Delgado reports: Continued improvement in knee w/ decreased pain and improved knee extension w/ less knee cap pain -   Persistent LB/LSS pain after walking, standing, bending, etc.     Objective   Tender S1, L5, L4, > L3;   (+) Prone inferior Sacral glide spring Test  (+) Prone bkwd bending sacral spring Test     TREATMENT:   MANUAL THERAPY  Mobilization at end range Prone inferior Sacral glide spring x 2min  Mobilization of Prone bkwd bending sacral spring x2 min  Manual mobilization to address limited flexion, SB to (L) (PROM w/ MET) in SL -     EXERCISES:   []   NUSTEP x 10 min L6  []   STANDING TKE w/ band (BLUE) x 20 w / much cuing for proper facilitation of quad / Glute    []   RUNNER STEP UP Hole 4 from bottom x 20  [x]   LATERAL STEP UP w/ ABDuction Hole 4 from bottom x 20   []   SEVERIANO LEG PRESS: 120# w/ Flexion stretch 15  [x]   FW LEG PRESS w/ UNILATERAL MVMT; 25# each side - 20 then 3 4 for 1  (90/90 hip knee flex max)  [x]   SEVERIANO ECC KNEE EXTENSION w/ P-H (R) 2# x 10  [x]   SEVERIANO HIP ABDuction 62# 2x 12 w/ emphasis on ECC  [x]   SEATED KNEE FLEXION; 30# Emphasis on ECC. 2x 12  []   SIDE STEPPING 40 ft x 2 ea w/ GREEN BAND above knees  []   MONSTER WALK FWD / RETRO 40 ft x2ea  GREEN BAND above knees  [x]   HINGE SQUATS x 15  [x]   UP/DOWN 2 flights of stairs w/ HR as needed  working on ecc lowering and glute/quad isolation w/ up steps  []   SLS holding on for glute medius facilitation   [x]   BALANCE BOARD:  Side / Side;  Front/Bk; Angled 2 min ea  [x]   Gait work 1 lap track to assess gait and facilitate posture correction and core facilitation -   []   OCTANE:  SEAT 13   Fwd/RETRO  10 min (5/5)  L7  (cuing to focus on (R) quad firing/facilitation)  []   SL QL Stretch (L) 2x 2 min   []   Supine Post rotation stretch (L) 2x2 min ea  []   Fig 4 piriformis stretch 60 sec  []   LTR x 1-  []   Supine HF stretch off side of bed x 2 min   []   TRANSVERSE ABDOMINUS ISOMETRICS 20/10 sec ea  []   CRUNCH w/ CORE STABILIZATION w/ emphasis on head relaxed in hands and not using neck mm -  [x]   Prone SUPERMAN over 2 pillows  []   HL SMALL MARCH w/ CORE STABILIZATION x 2 min  []   BRIDGE on HEELS 15/5 sec ea  []   SL HIP ABDUCTION 5x 3 rep  [x]   PRONE OVER YELLOW BALL;  HIP EXTENSION ALT / (B); OPP U/LE Ext x 10 ea  [x]   PLANK on BALL on WALL w/ Core emphasis  [x]   WALL / BALL PUSH-UP w/ Core emphasis        NMR: Verbal and tactile cues to facilitate VMO/Quad / GLUTE, Hip / Core musculature statically and dynamically. - - .     Functional / Therapeutic Activities:    TAPING / BRACING: NA  SEE EXERCISEs  Lumbar / LSS assessment     Assessment/Plan  63 yo female s/p (R) TKA 3- -   Pat-Fem pain preventing terminal knee extension, gait and proper quad / vmo firing -   Improved Quad / VMO firing w/ Muscle stimulation / NMR -   Very good ROM - improved gait and function -   Decreasing pain and improved function and mobility -      LB/SI jt dysfunction -      Progress slowly due to PF pain due to extent of OA and work performed on it including lateral release in surgery -     Progress strengthening /stabilization /functional activity       _________________________________________________    Manual Therapy:            15     mins  10454;  Therapeutic Exercise:    15    mins  32986;      Neuromuscular Ema:   08     mins  94125;    Therapeutic Activity:     20      mins  47851;     Ultrasound:                          mins  35421;  Iontophoresis:                     mins  28324;    Electrical Stimulation:         mins  13555 ( );  Mechanical Traction:          mins  41168  Dry Needling                       mins self-pay     Timed Treatment:   58    mins                  Total Treatment:     58    mins        Russ Torrez PT  Physical Therapist  Lic # [id number]

## 2024-05-15 ENCOUNTER — TREATMENT (OUTPATIENT)
Dept: PHYSICAL THERAPY | Facility: CLINIC | Age: 64
End: 2024-05-15
Payer: MEDICARE

## 2024-05-15 NOTE — PROGRESS NOTES
Physical Therapy Daily Note    Patient Name: Zoila Delgado         :  1960  Referring Physician: Mariano Yee MD  Treatment Date: 5/15/2024  Date of Initial Visit: Type: THERAPY  Noted: 3/14/2024    Visit Diagnoses:  No diagnosis found.  Patient seen for 21 sessions  _____________________________________________________    Subjective   Zoila Delgado reports: Knee continues to improve - central LBP better after last session, but notes (L) > (R) lateral trunk/spine pain when walking for exercise -     Objective   (L) ilium higher -   L>R QL very tight w/ Tp's  (+) Prone inferior Sacral glide spring Test, but improved overall  (+) Prone bkwd bending sacral spring Test, but improved overall    TREATMENT:   MANUAL THERAPY  [x]   Mobilization at end range Prone inferior Sacral glide spring x 2min  [x]   Mobilization of Prone bkwd bending sacral spring x2 min  [x]   DTM/ STM /TPR L>R QL  []   Manual mobilization to address limited flexion, SB to (L) (PROM w/ MET) in SL -      EXERCISES:   [x]   SL QL Stretch (L) 2x 2 min   [x]   Supine Post rotation stretch (L) 2x2 min ea  []   Fig 4 piriformis stretch 60 sec  []   LTR x 1-  [x]   Supine HF stretch off side of bed x 2 min   [x]   STANDING QL / HF Stretch (L) 2x30 sec ea  []   TRANSVERSE ABDOMINUS ISOMETRICS 20/10 sec ea  []   CRUNCH w/ CORE STABILIZATION w/ emphasis on head relaxed in hands and not using neck mm -  []   Prone SUPERMAN over 2 pillows  []   HL SMALL MARCH w/ CORE STABILIZATION x 2 min  []   BRIDGE on HEELS 15/5 sec ea  []   SL HIP ABDUCTION 5x 3 rep  []   PRONE OVER YELLOW BALL;  HIP EXTENSION ALT / (B); OPP U/LE Ext x 10 ea  []   PLANK on BALL on WALL w/ Core emphasis  []   WALL / BALL PUSH-UP w/ Core emphasis   []   NUSTEP x 10 min L6  []   STANDING TKE w/ band (BLUE) x 20 w / much cuing for proper facilitation of quad / Glute    []   RUNNER STEP UP Hole 4 from bottom x 20  []   LATERAL STEP UP w/ ABDuction Hole 4 from bottom x 20   []   SEVERIANO  LEG PRESS: 120# w/ Flexion stretch 15  []   FW LEG PRESS w/ UNILATERAL MVMT; 25# each side - 20 then 3 4 for 1  (90/90 hip knee flex max)  []   SEVERIANO ECC KNEE EXTENSION w/ P-H (R) 2# x 10  []   SEVERIANO HIP ABDuction 62# 2x 12 w/ emphasis on ECC  []   SEATED KNEE FLEXION; 30# Emphasis on ECC. 2x 12  []   SIDE STEPPING 40 ft x 2 ea w/ GREEN BAND above knees  []   MONSTER WALK FWD / RETRO 40 ft x2ea  GREEN BAND above knees  []   HINGE SQUATS x 15  []   UP/DOWN 2 flights of stairs w/ HR as needed working on ecc lowering and glute/quad isolation w/ up steps  []   SLS holding on for glute medius facilitation   []   BALANCE BOARD:  Side / Side;  Front/Bk; Angled 2 min ea  []   Gait work 1 lap track to assess gait and facilitate posture correction and core facilitation -   []   OCTANE:  SEAT 13   Fwd/RETRO  10 min (5/5)  L7  (cuing to focus on (R) quad firing/facilitation)    Functional / Therapeutic Activities:    TAPING / BRACING: NA  Lumbar / sacral / QL assessment -   Discussed / educated Pt on placement of TENS unit electrodes for lumbar pain control -   Jt protection, ADL modification; Posture and      Assessment/Plan  65 yo female s/p (R) TKA 3- -   Pat-Fem pain preventing terminal knee extension, gait and proper quad / vmo firing -   Improved Quad / VMO firing w/ Muscle stimulation / NMR -   Very good ROM - improved gait and function -   Decreasing pain and improved function and mobility -      LBP, Lumbosacral, sacral, SI jt pain w/ QL pain / myofascial pain - Improving w/ MT and stretching, etc.      Progress slowly due to PF pain due to extent of OA and work performed on it including lateral release in surgery -   LBP, etc exacerbated due to TKA and increased activities and abnormal gait, etc.      Progress strengthening /stabilization /functional activity       _________________________________________________    Manual Therapy:            25     mins  20875;  Therapeutic Exercise:    10     mins  96507;     Neuromuscular Ema:        mins  44919;    Therapeutic Activity:     10      mins  70145;     Ultrasound:                          mins  99119;  Iontophoresis:                     mins  31863;    Electrical Stimulation:         mins  05728 ( );  Mechanical Traction:          mins  75045  Dry Needling                       mins self-pay     Timed Treatment:   45    mins                  Total Treatment:     45    mins        Russ Torrez PT  Physical Therapist  Lic # [id number]

## 2024-05-20 ENCOUNTER — TREATMENT (OUTPATIENT)
Dept: PHYSICAL THERAPY | Facility: CLINIC | Age: 64
End: 2024-05-20
Payer: MEDICARE

## 2024-05-20 DIAGNOSIS — Z74.09 IMPAIRED FUNCTIONAL MOBILITY, BALANCE, GAIT, AND ENDURANCE: ICD-10-CM

## 2024-05-20 DIAGNOSIS — Z96.651 STATUS POST TOTAL RIGHT KNEE REPLACEMENT: Primary | ICD-10-CM

## 2024-05-20 DIAGNOSIS — R29.898 WEAKNESS OF RIGHT LOWER EXTREMITY: ICD-10-CM

## 2024-05-20 DIAGNOSIS — M25.661 STIFFNESS OF RIGHT KNEE: ICD-10-CM

## 2024-05-20 DIAGNOSIS — R26.9 ABNORMAL GAIT: ICD-10-CM

## 2024-05-20 PROCEDURE — 97140 MANUAL THERAPY 1/> REGIONS: CPT | Performed by: PHYSICAL THERAPIST

## 2024-05-20 PROCEDURE — 97530 THERAPEUTIC ACTIVITIES: CPT | Performed by: PHYSICAL THERAPIST

## 2024-05-20 PROCEDURE — 97110 THERAPEUTIC EXERCISES: CPT | Performed by: PHYSICAL THERAPIST

## 2024-05-20 NOTE — PROGRESS NOTES
Physical Therapy Daily Note    Patient Name: Zoila Delgado         :  1960  Referring Physician: Mariano Yee MD  Treatment Date: 2024  Date of Initial Visit: Type: THERAPY  Noted: 3/14/2024    Visit Diagnoses:    ICD-10-CM ICD-9-CM   1. Status post total right knee replacement  Z96.651 V43.65   2. Weakness of right lower extremity  R29.898 729.89   3. Stiffness of right knee  M25.661 719.56   4. Impaired functional mobility, balance, gait, and endurance  Z74.09 V49.89   5. Abnormal gait  R26.9 781.2     Patient seen for 22 sessions  _____________________________________________________    Subjective   Zoila Delgado reports: Pt reports LB feeling much better since last session - Knee continues to improve - rode [place] bike and kept a corey of > 80 rpm for some time w/o much resistance and has noted increased pain and swelling lateral knee and up ITB (R) -   Able to walk farther w/o LBP - Did work in garage for 2+ hours and noted increased LBP, but able to stretch it out and alleviated by next morning -     MM stim very helpful -     Objective   Pocket of swelling lateral knee jt (R) just lateral to PF jt -   Very tender distal 2/3 ITB up to GT (R)   Grossly tight - same -   Grossly improved QS/VMO firing (R)     TREATMENT:   MANUAL THERAPY:   STM / DTM / TPR along entire ITB (R)      EXERCISES:   SL ITB STRETCH x 2 min (R)   SL Hip ABDUCTION to FATIGUE w/ PH for ECCENTRICS once TIRED -   FW LEG PRESS w/ UNILATERAL MVMT; 25# each side - 20 then 3  for 1 x 5 (90/90 hip knee flex max)   SEATED HS CURLS 25# 2x 15 w/ emphasis on ECC return -   Up / Down 2 flights of stairs reciprocally x 2 ea w/ HR as needed mostly for ecc lowering w/ RLE -     Functional / Therapeutic Activities:    TAPING / BRACING: NA  SEE EXERCISE FLOW SHEET -     Assessment/Plan  65 yo female s/p (R) TKA 3- -   Pat-Fem pain preventing terminal knee extension, gait and proper quad / vmo firing -   Improved Quad / VMO firing  w/ Muscle stimulation / NMR -   Very good ROM - improved gait and function -   Decreasing pain and improved function and mobility -      LBP, Lumbosacral, sacral, SI jt pain w/ QL pain / myofascial pain - Improving w/ MT and stretching, etc.      Progress slowly due to PF pain due to extent of OA and work performed on it including lateral release in surgery - but much improved recently- MM stim very helpful -    ITB exacerbation recently with fast spinning on [employer] - Improved after MT    Progress strengthening /stabilization /functional activity       _________________________________________________    Manual Therapy:            15     mins  54513;  Therapeutic Exercise:    20    mins  42241;     Neuromuscular Ema:        mins  19157;    Therapeutic Activity:     10      mins  61103;     Ultrasound:                          mins  32756;  Iontophoresis:                     mins  56693;    Electrical Stimulation:         mins  72949 ( );  Mechanical Traction:          mins  74400  Dry Needling                       mins self-pay     Timed Treatment:   45    mins                  Total Treatment:     45    mins        Russ Torrez PT  Physical Therapist  Lic # [id number]

## 2024-05-23 ENCOUNTER — TREATMENT (OUTPATIENT)
Dept: PHYSICAL THERAPY | Facility: CLINIC | Age: 64
End: 2024-05-23
Payer: MEDICARE

## 2024-05-23 DIAGNOSIS — R29.898 WEAKNESS OF RIGHT LOWER EXTREMITY: ICD-10-CM

## 2024-05-23 DIAGNOSIS — Z96.651 STATUS POST TOTAL RIGHT KNEE REPLACEMENT: Primary | ICD-10-CM

## 2024-05-23 DIAGNOSIS — R26.9 ABNORMAL GAIT: ICD-10-CM

## 2024-05-23 DIAGNOSIS — Z74.09 IMPAIRED FUNCTIONAL MOBILITY, BALANCE, GAIT, AND ENDURANCE: ICD-10-CM

## 2024-05-23 DIAGNOSIS — M25.661 STIFFNESS OF RIGHT KNEE: ICD-10-CM

## 2024-05-23 NOTE — PROGRESS NOTES
Physical Therapy Daily Note    Patient Name: Zoila Delgado         :  1960  Referring Physician: Mariano Yee MD  Treatment Date: 2024  Date of Initial Visit: No linked episodes    Visit Diagnoses:    ICD-10-CM ICD-9-CM   1. Status post total right knee replacement  Z96.651 V43.65   2. Weakness of right lower extremity  R29.898 729.89   3. Stiffness of right knee  M25.661 719.56   4. Impaired functional mobility, balance, gait, and endurance  Z74.09 V49.89   5. Abnormal gait  R26.9 781.2     Patient seen for Visit count could not be calculated. Make sure you are using a visit which is associated with an episode. sessions  _____________________________________________________    Subjective   Zoila Delgado reports: lateral and posterior-lateral knee pain with knee flexion - Swelling lateral knee / PF jt since aggressive spinning maintaining 80rpm -   Knee cap continues to Improve - LBP continued improvement -     Objective   Increased pocket of swelling lateral PF jt / knee and anterior knee / patellar ballotment -   Improved up stairs reciprocally > down w/ HR going down -     TREATMENT  MANUAL THERAPY:   STM / DTM / TPR along entire ITB (R)     EXERCISES:   []   NUSTEP x 10 min L6  []   STANDING TKE w/ band (BLUE) x 20 w / much cuing for proper facilitation of quad / Glute    []   RUNNER STEP UP Hole 4 from bottom x 20  []   LATERAL STEP UP w/ ABDuction Hole 4 from bottom x 20   []   SEVERIANO LEG PRESS: 120# w/ Flexion stretch 15  [x]   FW LEG PRESS w/ UNILATERAL MVMT; 40# each side - 20 then 3 4 for 1  (90/90 hip knee flex max)  []   SEVERIANO ECC KNEE EXTENSION w/ P-H (R) 2# x 10  [x]   SEVERIANO HIP ABDuction 62# 2x 12 w/ emphasis on ECC  [x]   SEATED KNEE FLEXION; 25# Emphasis on ECC. 2x 12  []   SIDE STEPPING 40 ft x 2 ea w/ GREEN BAND above knees  []   MONSTER WALK FWD / RETRO 40 ft x2ea  GREEN BAND above knees  [x]   HINGE SQUATS x 20 on BOSU BALL  [x]   UP/DOWN 2 flights of stairs w/ HR as needed  working on ecc lowering and glute/quad isolation w/ up steps  []   SLS holding on for glute medius facilitation   [x]   BALANCE BOARD:  Side / Side;  Front/Bk; Angled 2 min ea  [x]   Gait work 1 lap track to assess gait and facilitate posture correction and core facilitation -   []   OCTANE:  SEAT 13   Fwd/RETRO  10 min (5/5)  L7  (cuing to focus on (R) quad firing/facilitation)  []   SL QL Stretch (L) 2x 2 min   []   Supine Post rotation stretch (L) 2x2 min ea  []   Fig 4 piriformis stretch 60 sec  []   LTR x 1-  []   Supine HF stretch off side of bed x 2 min   []   TRANSVERSE ABDOMINUS ISOMETRICS 20/10 sec ea  []   CRUNCH w/ CORE STABILIZATION w/ emphasis on head relaxed in hands and not using neck mm -  []   Prone SUPERMAN over 2 pillows  []   HL SMALL MARCH w/ CORE STABILIZATION x 2 min  []   BRIDGE on HEELS 15/5 sec ea  []   SL HIP ABDUCTION 5x 3 rep  []   PRONE OVER YELLOW BALL;  HIP EXTENSION ALT / (B); OPP U/LE Ext x 10 ea  []   PLANK on BALL on WALL w/ Core emphasis  []   WALL / BALL PUSH-UP w/ Core emphasis        NMR: Verbal and tactile cues to facilitate VMO/Quad / GLUTE, Hip / Core musculature statically and dynamically. - - .     Functional / Therapeutic Activities:    TAPING / BRACING: NA  SEE EXERCISE FLOW SHEET -        Assessment/Plan  63 yo female s/p (R) TKA 3- -   Pat-Fem pain preventing terminal knee extension, gait and proper quad / vmo firing -   Improved Quad / VMO firing w/ Muscle stimulation / NMR -   Very good ROM - improved gait and function -   Decreasing pain and improved function and mobility -     Increased swelling recently lateral / anterior knee -   ITB pain and distal lateral HS tendon -     Progress strengthening /stabilization /functional activity       _________________________________________________    Manual Therapy:            05     mins  53460;  Therapeutic Exercise:    10    mins  86523;     Neuromuscular Ema:   10     mins  95861;    Therapeutic Activity:      20      mins  96372;     Ultrasound:                          mins  64093;  Iontophoresis:                     mins  51099;    Electrical Stimulation:         mins  88216 ( );  Mechanical Traction:          mins  97381  Dry Needling                       mins self-pay     Timed Treatment:   45    mins                  Total Treatment:     45    mins        Russ Torrez PT  Physical Therapist  Lic # [id number]

## 2024-05-28 ENCOUNTER — TREATMENT (OUTPATIENT)
Dept: PHYSICAL THERAPY | Facility: CLINIC | Age: 64
End: 2024-05-28
Payer: MEDICARE

## 2024-05-28 DIAGNOSIS — Z96.651 STATUS POST TOTAL RIGHT KNEE REPLACEMENT: Primary | ICD-10-CM

## 2024-05-28 DIAGNOSIS — R26.9 ABNORMAL GAIT: ICD-10-CM

## 2024-05-28 DIAGNOSIS — M54.50 LUMBAR PAIN: ICD-10-CM

## 2024-05-28 DIAGNOSIS — M53.3 SACROILIAC JOINT DYSFUNCTION: ICD-10-CM

## 2024-05-28 DIAGNOSIS — M25.661 STIFFNESS OF RIGHT KNEE: ICD-10-CM

## 2024-05-28 DIAGNOSIS — R29.898 WEAKNESS OF RIGHT LOWER EXTREMITY: ICD-10-CM

## 2024-05-28 DIAGNOSIS — Z74.09 IMPAIRED FUNCTIONAL MOBILITY, BALANCE, GAIT, AND ENDURANCE: ICD-10-CM

## 2024-05-28 NOTE — PROGRESS NOTES
Physical Therapy Daily Note  PROGRESS NOTE / DISCHARGE    Patient Name: Zoila Delgado         :  1960  Referring Physician: Mariano Yee MD  Treatment Date: 2024  Date of Initial Visit: Type: THERAPY  Noted: 3/14/2024    Visit Diagnoses:    ICD-10-CM ICD-9-CM   1. Status post total right knee replacement  Z96.651 V43.65   2. Weakness of right lower extremity  R29.898 729.89   3. Stiffness of right knee  M25.661 719.56   4. Impaired functional mobility, balance, gait, and endurance  Z74.09 V49.89   5. Abnormal gait  R26.9 781.2   6. Lumbar pain  M54.50 724.2   7. Sacroiliac joint dysfunction  M53.3 724.6     Patient seen for 24 sessions  _____________________________________________________    Subjective   Zoila Delgado reports: continued improvement -   Going out of town then seeing Dr. Yee -   Working out w/      Objective   Decreased swelling lateral and anterior knee -   Residual tenderness distal ITB and distal biceps femoris into insertion (R) -   Knee AROM: 140-deg flexion;  5-deg extension w/o PF pain   Much improved VMO / Quad facilitation  Strong knee ext and flexion w/o pain   RIGHT Glute / LE weakness ambulating up stairs reciprocally -   Near normal gait w/ increased knee extension-   Able to ambulate up stairs reciprocally w/o HR and down stairs reciprocally w/ HR and mildly antalgic / difficult w/ control -      EXERCISES:   []   NUSTEP x 10 min L6  []   STANDING TKE w/ band (BLUE) x 20 w / much cuing for proper facilitation of quad / Glute    []   RUNNER STEP UP Hole 4 from bottom x 20  [x]   LATERAL STEP UP w/ ABDuction Hole 4 from bottom x 20   []   SEVERIANO LEG PRESS: 120# w/ Flexion stretch 15  [x]   FW LEG PRESS w/ UNILATERAL MVMT; 45# each side - 20 then 3 4 for 1  (90/90 hip knee flex max)  []   SEVERIANO ECC KNEE EXTENSION w/ P-H (R) 2# x 10  [x]   SEVERIANO HIP ABDuction 62# 2x 12 w/ emphasis on ECC  [x]   SEATED HAMSTRING CURLS; 25# Emphasis on ECC. 2x 15  []   SIDE  STEPPING 40 ft x 2 ea w/ GREEN BAND above knees  []   MONSTER WALK FWD / RETRO 40 ft x2ea  GREEN BAND above knees  [x]   HINGE SQUATS x 20 on BOSU BALL  [x]   UP/DOWN 2 flights of stairs w/ HR as needed working on ecc lowering and glute/quad isolation w/ up steps - EMPHASIS / CUING FOR GLUTE FACIL stepping up with RLE -   []   SLS holding on for glute medius facilitation   [x]   BALANCE BOARD:  Side / Side;  Front/Bk; Angled 2 min ea  [x]   Gait work 1 lap track to assess gait and facilitate posture correction and core facilitation -     NMR: Verbal and tactile cues to facilitate glutes, quads, Hip, LE, core musculature statically and dynamically. - Balance / proprioception activities -  -        Functional / Therapeutic Activities:    TAPING / BRACING: NA  SEE EXERCISE FLOW SHEET -   Discussed progression of exercises, importance of faciliaing glute when stepping up with RLE   KNEE FUNCTIONAL ASSESSMENT -     Assessment/Plan  65 yo female s/p (R) TKA 3- -   Pat-Fem pain preventing terminal knee extension, gait and proper quad / vmo firing -   Improved Quad / VMO firing w/ Muscle stimulation / NMR -   Very good ROM - improved gait and function -   Decreasing pain and improved function and mobility -     Residual tenderness distal ITB and distal biceps femoris -     MUCH IMPROVED - Gabrielle would benefit from continuing her HEP and working w/  and discontinue formal Physical Therapy -     GOAL STATUS:   SHORT TERM GOALS::    1) HEP Initiated for ROM, strengthening, functional activities to achieve LTGs -  -MET  2) Pain decreased 50% w/ ROM, gait / ADLs -    -MET  3) AROM  increased (R) knee to 5-110-deg to allow improved gait, stair ambulation    -MET  4) Pt demonstrating normal gait pattern w/ Assistive device ;   -MET     LONG TERM GOALS: ( at time of DISCHARGE):   1) (I) HEP ;   -MET    2) AROM 0-120 (R) and pain free; to allow normal gait, reciprocal stair climbing, exercises / ADLs, etc.  -MOSTLY MET    3) Strength (R) LE >4+/5 to allow LE ADL's , fitness-related activities including hiking, etc w/o restrictions;  - MET   4) Pt able to ambulate distances to allow ADLs and fitness goals / hiking w/ normal gait pattern and ambulate > 2 flights of stairs reciprocally w/ HR prn.   -MET  5) Functional Test KOS > 70/80 - PATIENT TO FILL OUT and RETURN - Anticipate meeting this goal -    DC PT to HEP -        _________________________________________________    Manual Therapy:                 mins  91279;  Therapeutic Exercise:    15    mins  58143;     Neuromuscular Ema:   08     mins  57310;    Therapeutic Activity:     22      mins  16711;     Ultrasound:                          mins  28692;  Iontophoresis:                     mins  98771;    Electrical Stimulation:         mins  08385 ( );  Mechanical Traction:          mins  34538  Dry Needling                       mins self-pay     Timed Treatment:   45    mins                  Total Treatment:     45    mins        Russ Torrez, PT  Physical Therapist  Lic # [id number]

## 2024-06-12 ENCOUNTER — OFFICE VISIT (OUTPATIENT)
Dept: ORTHOPEDIC SURGERY | Facility: CLINIC | Age: 64
End: 2024-06-12
Payer: MEDICARE

## 2024-06-12 VITALS — WEIGHT: 129.4 LBS | BODY MASS INDEX: 20.79 KG/M2 | HEIGHT: 66 IN

## 2024-06-12 DIAGNOSIS — Z96.651 STATUS POST TOTAL RIGHT KNEE REPLACEMENT: Primary | ICD-10-CM

## 2024-06-12 PROCEDURE — 99024 POSTOP FOLLOW-UP VISIT: CPT | Performed by: ORTHOPAEDIC SURGERY

## 2024-06-12 NOTE — PROGRESS NOTES
Subjective:     Patient ID: Zoila Delgado is a 64 y.o. female.    Chief Complaint:  Follow-up status post right total knee arthroplasty-3/12/2024  History of Present Illness  Zoila Delgado returns to clinic today for evaluation of right knee, overall doing very well at this point in time, still has some intermittent swelling particular with longer distance activities as well as some rebound aching type pain but otherwise does well with motion and function.  Denies any hip or groin pain.  No issues with incision.  No fevers chills or sweats.     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 Yee    Low back pain     Lumbar disc disease with radiculopathy 01/31/2023    Palpitations     Perirectal abscess     PONV (postoperative nausea and vomiting)     after ablation    Renal cyst     left    Rotator cuff syndrome     Severe motion sickness     Shoulder injury     Trochanteric bursitis of left hip 01/31/2023     Past Surgical History:   Procedure Laterality Date    COLONOSCOPY  11/30/2015    tics,nibh    ENDOMETRIAL ABLATION      x2    ENDOSCOPY  11/30/2015    HH, Z-line irregular, 42 cm. fromincisors, chronic inflammation    ENDOSCOPY N/A 05/07/2019    [facility]onsivais[facility], HH    KNEE SURGERY      TONSILLECTOMY  1968    TOTAL KNEE  "ARTHROPLASTY Right 03/12/2024    Procedure: TOTAL KNEE ARTHROPLASTY AND ALL ASSOCIATED PROCEDURES;  Surgeon: Mariano Yee MD;  Location: [facility];  Service: Orthopedics;  Laterality: Right;       Family History   Problem Relation Age of Onset    Hypertension Mother     Cancer Mother     Hypertension Father     Breast cancer Sister     Cancer Sister     Stroke Maternal Grandmother     Malig Hyperthermia Neg Hx          Review of Systems        Objective:  Vitals:    06/12/24 1016   Weight: 58.7 kg (129 lb 6.4 oz)   Height: 167.6 cm (66\")         06/12/24  1016   Weight: 58.7 kg (129 lb 6.4 oz)     Body mass index is 20.89 kg/m².  General: No acute distress.  Resp: normal respiratory effort  Skin: no rashes or wounds; normal turgor  Psych: mood and affect appropriate; recent and remote memory intact        Ortho Exam     Right knee-midline incision well-healed, active range of motion 0 to 140 degrees, 5 out of 5 strength in flexion and extension, mild residual effusion most significant superior lateral.  Stable to varus and valgus stress at 0 and 30 degrees with good endpoint on posterior drawer at 90 degrees    Imaging:  None today  Assessment:        1. Status post total right knee replacement           Plan:          Discussed treatment options at length with patient at today's visit.  Patient is doing well at this point in time, continue work on range of motion and strength and function.   Recommend use of knee sleeve intermittently as tolerated for support and to help with swelling  Follow up: 9 months for 1 year visit with x-rays of right knee or sooner if needed      Zoila Delgado was in agreement with plan and had all questions answered.     Orders:  No orders of the defined types were placed in this encounter.      Medications:  No orders of the defined types were placed in this encounter.      Followup:  No follow-ups on file.    Diagnoses and all orders for this visit:    1. Status post total right " knee replacement (Primary)          Dictated utilizing Dragon dictation

## 2024-07-12 ENCOUNTER — OFFICE VISIT (OUTPATIENT)
Dept: ORTHOPEDIC SURGERY | Facility: CLINIC | Age: 64
End: 2024-07-12
Payer: MEDICARE

## 2024-07-12 VITALS
HEART RATE: 82 BPM | BODY MASS INDEX: 20.73 KG/M2 | SYSTOLIC BLOOD PRESSURE: 117 MMHG | WEIGHT: 129 LBS | HEIGHT: 66 IN | DIASTOLIC BLOOD PRESSURE: 84 MMHG

## 2024-07-12 DIAGNOSIS — M25.531 BILATERAL WRIST PAIN: ICD-10-CM

## 2024-07-12 DIAGNOSIS — M25.532 BILATERAL WRIST PAIN: ICD-10-CM

## 2024-07-12 DIAGNOSIS — R20.0 BILATERAL HAND NUMBNESS: Primary | ICD-10-CM

## 2024-07-12 PROCEDURE — 99214 OFFICE O/P EST MOD 30 MIN: CPT | Performed by: NURSE PRACTITIONER

## 2024-07-12 RX ORDER — PREDNISONE 5 MG/1
TABLET ORAL
Qty: 21 TABLET | Refills: 0 | Status: SHIPPED | OUTPATIENT
Start: 2024-07-12

## 2024-07-12 RX ORDER — CELECOXIB 200 MG/1
200 CAPSULE ORAL DAILY
Qty: 30 CAPSULE | Refills: 0 | Status: SHIPPED | OUTPATIENT
Start: 2024-07-12

## 2024-07-12 NOTE — PROGRESS NOTES
Subjective:     Patient ID: Zoila Delgado is a 64 y.o. female.    Chief Complaint:  Bilateral wrist pain, new issue examiner  History of Present Illness  History of Present Illness  The patient presents to clinic today for evaluation of bilateral upper extremities.    She is experiencing pain along the distal ulna bilaterally as well as swelling, but does report a previous diagnosis previously of carpal tunnel syndrome. She is currently utilizing wrist immobilizers at night, which do help decrease her symptoms. Her symptoms began experiencing symptoms approximately a month ago when she was painting overhead with a paint brush. In the past, she has taken Celebrex and the symptoms do subside. She is rating discomfort at a 5 out of a 10, aching, stabbing in nature with numbness and tingling extending to the palmar aspect of the hand into the long finger, ring finger, index finger, and thumb. She describes the pain as stabbing and aching, especially with rotational exercises of the wrist as well as stiffness. She has tried ice, bracing, and anti-inflammatory. She denies any recent x-ray, MRI, or CT. She denies any prior EMG and CT of upper extremities. She denies any other concerns present.       Social History     Occupational History    Occupation: RETIRED    Tobacco Use    Smoking status: Never    Smokeless tobacco: Never   Vaping Use    Vaping status: Never Used   Substance and Sexual Activity    Alcohol use: No    Drug use: No    Sexual activity: Yes     Partners: Female     Birth control/protection: Post-menopausal      Past Medical History:   Diagnosis Date    Ankle sprain     Arthritis     Arthritis of back Jan 22    Dr. Owen    Monsivais esophagus     Cervical disc disorder     Diverticular disease     Diverticulitis     Dyspepsia     Gastritis     GERD (gastroesophageal reflux disease)     HH (hiatus hernia)     Hip arthrosis July 27 2022    Dr Yee did Mri    Hyperlipidemia     Knee swelling Several years  "ago    Dr. Yee    Low back pain     Lumbar disc disease with radiculopathy 01/31/2023    Palpitations     Perirectal abscess     PONV (postoperative nausea and vomiting)     after ablation    Renal cyst     left    Rotator cuff syndrome     Severe motion sickness     Shoulder injury     Trochanteric bursitis of left hip 01/31/2023     Past Surgical History:   Procedure Laterality Date    COLONOSCOPY  11/30/2015    tics,nibh    ENDOMETRIAL ABLATION      x2    ENDOSCOPY  11/30/2015    HH, Z-line irregular, 42 cm. fromincisors, chronic inflammation    ENDOSCOPY N/A 05/07/2019    Monsivais's, HH    KNEE SURGERY      TONSILLECTOMY  1968    TOTAL KNEE ARTHROPLASTY Right 03/12/2024    Procedure: TOTAL KNEE ARTHROPLASTY AND ALL ASSOCIATED PROCEDURES;  Surgeon: Mariano Yee MD;  Location: [facility] OR;  Service: Orthopedics;  Laterality: Right;       Family History   Problem Relation Age of Onset    Hypertension Mother     Cancer Mother     Hypertension Father     Breast cancer Sister     Cancer Sister     Stroke Maternal Grandmother     Malig Hyperthermia Neg Hx                Objective:  Physical Exam    Vital signs reviewed.   General: No acute distress.  Eyes: conjunctiva clear; pupils equally round and reactive  ENT: external ears and nose atraumatic; oropharynx clear  CV: no peripheral edema  Resp: normal respiratory effort  Skin: no rashes or wounds; normal turgor  Psych: mood and affect appropriate; recent and remote memory intact    Vitals:    07/12/24 0813   BP: 117/84   Pulse: 82   Weight: 58.5 kg (129 lb)   Height: 167.6 cm (66\")         07/12/24  0813   Weight: 58.5 kg (129 lb)     Body mass index is 20.82 kg/m².      Right Hand Exam     Tenderness   The patient is experiencing tenderness in the palmar area and ulnar area.    Other   Sensation: normal  Pulse: present    Comments:  Positive Tinel's at median nerve  Positive Durkan's exam with paresthesia distributed through the palmar aspect of the index, " thumb, long finger and radial aspect of the ring finger  Flex/extend all digits  Brisk cap refill all digits  Positive tenderness along the TFCC ligament      Left Hand Exam     Tenderness   The patient is experiencing tenderness in the palmar area and ulnar area.     Other   Sensation: normal  Pulse: present    Comments:  Positive Tinel's at median nerve  Positive Durkan's exam with paresthesia distributed through the palmar aspect of the index, thumb, long finger and radial aspect of the ring finger  Flex/extend all digits  Brisk cap refill all digits  Positive tenderness along the TFCC ligament             Physical Exam      Imaging:  Bilateral Wrist X-Ray  Indication: Pain  AP, Lateral, and Oblique views    Findings:  No fracture  No bony lesion  Normal soft tissues  Mild degeneration CMC joint first digit bilaterally, positive swelling ulnar styloid bilaterally    No prior studies were available for comparison.    Assessment:        1. Bilateral hand numbness    2. Bilateral wrist pain         Assessment & Plan  1. Bilateral upper extremity pain.  I discussed plan of care with patient. I do recommend she continue with the soft wrist immobilizers at night, we did discuss the rotational activities with the wrist are going to significantly exacerbate her symptoms as she does have ligamentous involvement as well as the paresthesia of bilateral hands. The soft wrist immobilizers will be exceptionally helpful at night. Maxim is providing her rest, and she can continue with activity as tolerated throughout the day. We did discuss to avoid dips with the upper extremities and other rotational lifting as to decrease some of the inflammation. I will also start oral prednisone 5 mg tablets, 3 tablets once daily for 7 days, then she can start Celebrex for the following month. If no improvement in the next 4 to 6 weeks, I will gladly see her back. I will proceed at that point with EMG and CT testing bilateral upper  extremities. If she continues to experience the paresthesia, she was encouraged to call with any questions or concerns. All questions were answered.    Orders:  Orders Placed This Encounter   Procedures    XR Wrist 3+ View Bilateral     New Medications Ordered This Visit   Medications    predniSONE (DELTASONE) 5 MG tablet     Sig: Three tabs once daily for seven days     Dispense:  21 tablet     Refill:  0    celecoxib (CeleBREX) 200 MG capsule     Sig: Take 1 capsule by mouth Daily.     Dispense:  30 capsule     Refill:  0         Dragon dictation utilized  BMI is within normal parameters. No other follow-up for BMI required.       Patient or patient representative verbalized consent for the use of Ambient Listening during the visit with  SYD Landis for chart documentation. 7/12/2024  09:15 EDT

## 2024-08-08 RX ORDER — CELECOXIB 200 MG/1
200 CAPSULE ORAL DAILY
Qty: 30 CAPSULE | Refills: 0 | Status: SHIPPED | OUTPATIENT
Start: 2024-08-08

## 2024-08-08 NOTE — TELEPHONE ENCOUNTER
Rx Refill Note  Requested Prescriptions     Pending Prescriptions Disp Refills    celecoxib (CeleBREX) 200 MG capsule [Pharmacy Med Name: CELECOXIB 200MG CAPSULES] 30 capsule 0     Sig: Take 1 capsule by mouth Daily.      Last office visit with prescribing clinician: 7/12/2024      Next office visit with prescribing clinician: Visit date not found   Last Filled: 7/12/2024      Bilateral hand numbness  Bilateral wrist pain    Margo Ulrich MA  08/08/24, 08:02 EDT    Previous RX pended for your approval, change or denial.     {TIP  Encounters:    {TIP  Please add Last Relevant Lab Date if appropriate:  {TIP  Is Refill Pharmacy correct?:

## 2024-08-20 RX ORDER — CELECOXIB 200 MG/1
200 CAPSULE ORAL DAILY
Qty: 30 CAPSULE | Refills: 0 | Status: SHIPPED | OUTPATIENT
Start: 2024-08-20

## 2024-08-20 NOTE — TELEPHONE ENCOUNTER
Rx Refill Note  Requested Prescriptions     Pending Prescriptions Disp Refills    celecoxib (CeleBREX) 200 MG capsule [Pharmacy Med Name: CELECOXIB 200MG CAPSULES] 30 capsule 0     Sig: TAKE 1 CAPSULE BY MOUTH DAILY      Last office visit with prescribing clinician: 7/12/2024      Next office visit with prescribing clinician: 8/21/2024   Last Filled: 8/8/2024    Bilateral hand numbness  Bilateral wrist pain    Margo Ulrich MA  08/20/24, 14:34 EDT    Previous RX pended for your approval, change or denial.     {TIP  Encounters:    {TIP  Please add Last Relevant Lab Date if appropriate:  {TIP  Is Refill Pharmacy correct?:

## 2024-08-21 ENCOUNTER — OFFICE VISIT (OUTPATIENT)
Dept: ORTHOPEDIC SURGERY | Facility: CLINIC | Age: 64
End: 2024-08-21
Payer: MEDICARE

## 2024-08-21 VITALS
BODY MASS INDEX: 20.73 KG/M2 | HEART RATE: 112 BPM | SYSTOLIC BLOOD PRESSURE: 124 MMHG | DIASTOLIC BLOOD PRESSURE: 76 MMHG | WEIGHT: 129 LBS | HEIGHT: 66 IN

## 2024-08-21 DIAGNOSIS — S92.411A CLOSED DISPLACED FRACTURE OF PROXIMAL PHALANX OF RIGHT GREAT TOE, INITIAL ENCOUNTER: ICD-10-CM

## 2024-08-21 DIAGNOSIS — M79.674 PAIN OF RIGHT GREAT TOE: Primary | ICD-10-CM

## 2024-08-21 NOTE — PROGRESS NOTES
Subjective:     Patient ID: Zoila Delgado is a 64 y.o. female.    Chief Complaint:  Right foot injury, new issue to examiner  History of Present Illness  History of Present Illness    Zoila Delgado presents to clinic today for evaluation of her right foot.  She dropped a 45 pound weight on her foot approximately a month ago began experiencing immediate pain she was able to continue with her activity avoidance of pressure to the toe but otherwise has done fairly well she did  a fracture shoe which she is currently utilizing for ambulatory activities.  Maximal tenderness present along the DIP joint continues to experience pretty significant swelling of the joint but is able to ambulate.  She did note significant bruising and pain along the great toe right discomfort a 3 out of 10 aching in nature positive for redness, swelling and bruising and stiffness has tried ice pain made worse by walking however has continued as well as taking anti-inflammatory medication.  Denies any recent x-ray imaging.  Denies other concerns present.     Social History     Occupational History    Occupation: RETIRED    Tobacco Use    Smoking status: Never    Smokeless tobacco: Never   Vaping Use    Vaping status: Never Used   Substance and Sexual Activity    Alcohol use: No    Drug use: No    Sexual activity: Yes     Partners: Female     Birth control/protection: Post-menopausal, Partner of same sex      Past Medical History:   Diagnosis Date    Ankle sprain     Arthritis     Arthritis of back Jan 22    Dr. Owen    Monsivais esophagus     Cervical disc disorder     Diverticular disease     Diverticulitis     Dyspepsia     Gastritis     GERD (gastroesophageal reflux disease)     HH (hiatus hernia)     Hip arthrosis July 27 2022    Dr Yee did Mri    Hyperlipidemia     Knee swelling Several years ago    Dr. Yee    Low back pain     Lumbar disc disease with radiculopathy 01/31/2023    Palpitations     Perirectal abscess     PONV  "(postoperative nausea and vomiting)     after ablation    Renal cyst     left    Rotator cuff syndrome     Severe motion sickness     Shoulder injury     Trochanteric bursitis of left hip 01/31/2023     Past Surgical History:   Procedure Laterality Date    COLONOSCOPY  11/30/2015    tics,nibh    ENDOMETRIAL ABLATION      x2    ENDOSCOPY  11/30/2015    HH, Z-line irregular, 42 cm. fromincisors, chronic inflammation    ENDOSCOPY N/A 05/07/2019    Monsivais's, HH    KNEE SURGERY      TONSILLECTOMY  1968    TOTAL KNEE ARTHROPLASTY Right 03/12/2024    Procedure: TOTAL KNEE ARTHROPLASTY AND ALL ASSOCIATED PROCEDURES;  Surgeon: Mariano Yee MD;  Location: [facility] OR;  Service: Orthopedics;  Laterality: Right;       Family History   Problem Relation Age of Onset    Hypertension Mother     Cancer Mother     Hypertension Father     Breast cancer Sister     Cancer Sister     Stroke Maternal Grandmother     Malig Hyperthermia Neg Hx                Objective:  Physical Exam    Vital signs reviewed.   General: No acute distress.  Eyes: conjunctiva clear; pupils equally round and reactive  ENT: external ears and nose atraumatic; oropharynx clear  CV: no peripheral edema  Resp: normal respiratory effort  Skin: no rashes or wounds; normal turgor  Psych: mood and affect appropriate; recent and remote memory intact    Vitals:    08/21/24 1313   BP: 124/76   Pulse: 112   Weight: 58.5 kg (129 lb)   Height: 167.6 cm (66\")         08/21/24  1313   Weight: 58.5 kg (129 lb)     Body mass index is 20.82 kg/m².      Ortho Exam     Physical Exam  Right foot great toe examined out of shoe  Moderate swelling remains present along the distal aspect of the digit  Positive sensation the dorsum and plantar aspect of the foot including all digits  2+ dorsalis pedis pulse    Imaging:  Right Forearm X-Ray  Indication: Pain  AP and Lateral views    Findings:  Displaced fracture along the proximal phalanx great toe distally appears to be healing " fracture along the distal phalanx base appears to be healing well callus appreciated  No bony lesion  Normal soft tissues  Normal joint spaces    No prior studies were available for comparison.    Assessment:        1. Pain of right great toe    2. Closed displaced fracture of proximal phalanx of right great toe, initial encounter         Assessment & Plan      Plan:  Discussed plan of care with patient.  We did discuss continue fracture shoe until she is able to return to tennis shoe.  Will plan to reevaluate in 3-4 weeks with repeat x-ray imaging right foot to assure healing.  Continue with ice anti-inflammatory medication and easing back into activity.  All questions answered.  Orders:  Orders Placed This Encounter   Procedures    XR Foot 3+ View Right     No orders of the defined types were placed in this encounter.        Dragon dictation utilized  BMI is within normal parameters. No other follow-up for BMI required.

## 2024-09-12 ENCOUNTER — OFFICE VISIT (OUTPATIENT)
Dept: ORTHOPEDIC SURGERY | Facility: CLINIC | Age: 64
End: 2024-09-12
Payer: MEDICARE

## 2024-09-12 VITALS — BODY MASS INDEX: 20.73 KG/M2 | WEIGHT: 129 LBS | HEIGHT: 66 IN

## 2024-09-12 DIAGNOSIS — S92.411D CLOSED DISPLACED FRACTURE OF PROXIMAL PHALANX OF RIGHT GREAT TOE WITH ROUTINE HEALING, SUBSEQUENT ENCOUNTER: Primary | ICD-10-CM

## 2024-09-12 NOTE — PROGRESS NOTES
Subjective:     Patient ID: Zoila Delgado is a 64 y.o. female.    Chief Complaint:  Follow-up displaced fracture along the proximal phalanx great toe right foot  History of Present Illness  History of Present Illness  Gabrielle returns to clinic today for follow-up of her right foot.  She is experiencing pain with attempted flexion of the DIP joint at the great toe but otherwise doing quite well denies that she is experiencing any tenderness to palpate she does continue to experience some discoloration and bruising but otherwise able to ambulate and continue with activities of daily living.  Denies any other concerns present.       Social History     Occupational History    Occupation: RETIRED    Tobacco Use    Smoking status: Never    Smokeless tobacco: Never   Vaping Use    Vaping status: Never Used   Substance and Sexual Activity    Alcohol use: No    Drug use: No    Sexual activity: Yes     Partners: Female     Birth control/protection: Post-menopausal, Partner of same sex      Past Medical History:   Diagnosis Date    Ankle sprain     Arthritis     Arthritis of back Jan 22    Dr. Owen    Monsivais esophagus     Cervical disc disorder     Diverticular disease     Diverticulitis     Dyspepsia     Gastritis     GERD (gastroesophageal reflux disease)     HH (hiatus hernia)     Hip arthrosis July 27 2022    Dr Yee did Mri    Hyperlipidemia     Knee swelling Several years ago    Dr. Yee    Low back pain     Lumbar disc disease with radiculopathy 01/31/2023    Palpitations     Perirectal abscess     PONV (postoperative nausea and vomiting)     after ablation    Renal cyst     left    Rotator cuff syndrome     Severe motion sickness     Shoulder injury     Trochanteric bursitis of left hip 01/31/2023     Past Surgical History:   Procedure Laterality Date    COLONOSCOPY  11/30/2015    tics,nibh    ENDOMETRIAL ABLATION      x2    ENDOSCOPY  11/30/2015    HH, Z-line irregular, 42 cm. fromincisors, chronic inflammation  "   ENDOSCOPY N/A 05/07/2019    Sabine, MARCELLA    KNEE SURGERY      TONSILLECTOMY  1968    TOTAL KNEE ARTHROPLASTY Right 03/12/2024    Procedure: TOTAL KNEE ARTHROPLASTY AND ALL ASSOCIATED PROCEDURES;  Surgeon: Mariano Yee MD;  Location: [facility];  Service: Orthopedics;  Laterality: Right;       Family History   Problem Relation Age of Onset    Hypertension Mother     Cancer Mother     Hypertension Father     Breast cancer Sister     Cancer Sister     Stroke Maternal Grandmother     Malig Hyperthermia Neg Hx                Objective:  Physical Exam    Vital signs reviewed.   General: No acute distress.  Eyes: conjunctiva clear; pupils equally round and reactive  ENT: external ears and nose atraumatic; oropharynx clear  CV: no peripheral edema  Resp: normal respiratory effort  Skin: no rashes or wounds; normal turgor  Psych: mood and affect appropriate; recent and remote memory intact    Vitals:    09/12/24 0833   Weight: 58.5 kg (129 lb)   Height: 167.6 cm (66\")         09/12/24  0833   Weight: 58.5 kg (129 lb)     Body mass index is 20.82 kg/m².      Ortho Exam     Physical Exam    Right foot examined out of shoe  Maximal tenderness continues to be present along the great toe, DIP joint greater medially  Brisk cap refill all digits right foot  2+ distal radius pulse  Imaging:  Right Foot X-Ray  Indication: Pain  AP, Lateral, and Oblique views    Findings:  Mildly displaced fracture along the proximal phalanx of the great toe appears to be healing well, fracture along the distal phalanx of the base with significant callus appreciated  No bony lesion  Normal soft tissues  Normal joint spaces    prior studies were available for comparison.    Assessment:        1. Closed displaced fracture of proximal phalanx of right great toe with routine healing, subsequent encounter         Assessment & Plan      Plan:  Discussed plan of care with patient we did review x-ray imaging again the fracture does appear to be " healing we did discuss the increased risk of arthritis at the DIP joint we also discussed inability for flexion going moving forward she is able to tolerate activities of daily living wishes to hold off on any further treatment will plan to see her back in clinic as needed.  All questions answered.  Orders:  Orders Placed This Encounter   Procedures    XR Foot 3+ View Right     No orders of the defined types were placed in this encounter.          Dragon dictation utilized  BMI is within normal parameters. No other follow-up for BMI required.

## 2024-09-13 PROBLEM — S92.411D: Status: ACTIVE | Noted: 2024-09-13

## 2024-09-16 ENCOUNTER — OFFICE VISIT (OUTPATIENT)
Dept: CARDIOLOGY | Facility: CLINIC | Age: 64
End: 2024-09-16
Payer: MEDICARE

## 2024-09-16 VITALS
HEART RATE: 101 BPM | BODY MASS INDEX: 20.76 KG/M2 | WEIGHT: 129.2 LBS | HEIGHT: 66 IN | SYSTOLIC BLOOD PRESSURE: 116 MMHG | DIASTOLIC BLOOD PRESSURE: 76 MMHG

## 2024-09-16 DIAGNOSIS — R00.2 PALPITATIONS: Primary | ICD-10-CM

## 2024-09-16 DIAGNOSIS — E78.2 MIXED HYPERLIPIDEMIA: ICD-10-CM

## 2024-09-16 PROCEDURE — 93000 ELECTROCARDIOGRAM COMPLETE: CPT | Performed by: INTERNAL MEDICINE

## 2024-09-16 PROCEDURE — 1160F RVW MEDS BY RX/DR IN RCRD: CPT | Performed by: INTERNAL MEDICINE

## 2024-09-16 PROCEDURE — 99214 OFFICE O/P EST MOD 30 MIN: CPT | Performed by: INTERNAL MEDICINE

## 2024-09-16 PROCEDURE — 1159F MED LIST DOCD IN RCRD: CPT | Performed by: INTERNAL MEDICINE

## 2024-10-01 RX ORDER — CEPHALEXIN 500 MG/1
CAPSULE ORAL
Qty: 4 CAPSULE | Refills: 2 | OUTPATIENT
Start: 2024-10-01

## 2024-11-16 DIAGNOSIS — R10.9 ABDOMINAL DISCOMFORT: ICD-10-CM

## 2024-11-16 DIAGNOSIS — R11.0 NAUSEA: ICD-10-CM

## 2024-11-18 ENCOUNTER — TELEPHONE (OUTPATIENT)
Dept: GASTROENTEROLOGY | Facility: CLINIC | Age: 64
End: 2024-11-18
Payer: MEDICARE

## 2024-11-18 RX ORDER — PANTOPRAZOLE SODIUM 40 MG/1
40 TABLET, DELAYED RELEASE ORAL 2 TIMES DAILY
Qty: 90 TABLET | Refills: 0 | Status: SHIPPED | OUTPATIENT
Start: 2024-11-18 | End: 2024-11-22 | Stop reason: SDUPTHER

## 2024-11-18 NOTE — TELEPHONE ENCOUNTER
Ok for the hub to relay and schedule.  Left vm for the pt to call back and schedule her annual follow up to see Evelin Charlton or Dr Arias due in January

## 2024-11-22 ENCOUNTER — OFFICE VISIT (OUTPATIENT)
Dept: GASTROENTEROLOGY | Facility: CLINIC | Age: 64
End: 2024-11-22
Payer: MEDICARE

## 2024-11-22 VITALS
BODY MASS INDEX: 20.63 KG/M2 | DIASTOLIC BLOOD PRESSURE: 83 MMHG | WEIGHT: 128.4 LBS | HEIGHT: 66 IN | HEART RATE: 76 BPM | SYSTOLIC BLOOD PRESSURE: 122 MMHG

## 2024-11-22 DIAGNOSIS — R11.0 NAUSEA: ICD-10-CM

## 2024-11-22 DIAGNOSIS — K59.04 CHRONIC IDIOPATHIC CONSTIPATION: Primary | ICD-10-CM

## 2024-11-22 DIAGNOSIS — R10.9 ABDOMINAL DISCOMFORT: ICD-10-CM

## 2024-11-22 DIAGNOSIS — K31.84 GASTROPARESIS: ICD-10-CM

## 2024-11-22 RX ORDER — PANTOPRAZOLE SODIUM 40 MG/1
40 TABLET, DELAYED RELEASE ORAL 2 TIMES DAILY
Qty: 180 TABLET | Refills: 1 | Status: SHIPPED | OUTPATIENT
Start: 2024-11-22

## 2024-11-22 NOTE — PROGRESS NOTES
"Chief Complaint  Monsivais's and Heartburn    Subjective          History of Present Illness    Zoila Delgado is a  64 y.o. female presents for follow-up.  She has a history of abdominal pain.  She is a patient of Dr. Arias and was last seen by me 1/2024.    At last visit she was having worsening reflux and abdominal pain.  She was placed on pantoprazole 40 mg twice daily and subsequently underwent EGD and colonoscopy as below. Today patient reports she is doing really well on pantoprazole twice daily.  She states this keeps GERD well-controlled for the most part other than occasional flares at nighttime depending on what she eats.  She says once or twice a year she will wake up in the middle the night feeling nauseous.  She says she does not eat before bedtime and is not sure if there are any food correlations to this.  She says this is not a regular occurrence and has not happened in quite some time now.  She does get a little bit constipated and uses MiraLAX as needed for this.  Generally has regular bowel movements.  No black or bloody stool.    2/15/2024 EGD showed salmon-colored mucosa which was biopsied, gastritis, normal examined duodenum.  Gastric biopsies were normal.  No Monsivais's changes seen on biopsy, but this does not mean that she does not have Monsivais's changes that have been seen previously.  Recommend repeat EGD in 5 years to follow-up on Monsivais's.    2/15/2024 colonoscopy showed diverticulosis, nonbleeding internal hemorrhoids and otherwise normal exam.  Repeat colonoscopy 10 years.    Objective   Vital Signs:   /83 (BP Location: Right arm, Patient Position: Sitting, Cuff Size: Adult)   Pulse 76   Ht 167.6 cm (66\")   Wt 58.2 kg (128 lb 6.4 oz)   BMI 20.72 kg/m²       Physical Exam  Constitutional:       General: She is not in acute distress.     Appearance: Normal appearance.   Eyes:      General: No scleral icterus.  Pulmonary:      Effort: Pulmonary effort is normal.   Abdominal:     "  General: Abdomen is flat. Bowel sounds are normal. There is no distension.      Tenderness: There is no abdominal tenderness. There is no guarding.   Skin:     Coloration: Skin is not jaundiced.   Neurological:      General: No focal deficit present.      Mental Status: She is alert and oriented to person, place, and time.   Psychiatric:         Mood and Affect: Mood normal.         Behavior: Behavior normal.          Result Review :   The following data was reviewed by: Evelin Charlton PA-C on 11/22/2024:  CMP          2/27/2024    13:40   CMP   Glucose 104    BUN 14    Creatinine 0.68    EGFR 96.2    Sodium 139    Potassium 3.8    Chloride 101    Calcium 9.1    BUN/Creatinine Ratio 20.6    Anion Gap 9.7      CBC          2/27/2024    13:40   CBC   WBC 5.27    RBC 4.23    Hemoglobin 13.9    Hematocrit 42.1    MCV 99.5    MCH 32.9    MCHC 33.0    RDW 12.4    Platelets 277              Assessment:   Diagnoses and all orders for this visit:    1. Chronic idiopathic constipation (Primary)    2. Abdominal discomfort  -     pantoprazole (PROTONIX) 40 MG EC tablet; Take 1 tablet by mouth 2 (Two) Times a Day.  Dispense: 180 tablet; Refill: 1    3. Nausea  -     pantoprazole (PROTONIX) 40 MG EC tablet; Take 1 tablet by mouth 2 (Two) Times a Day.  Dispense: 180 tablet; Refill: 1    4. Gastroparesis          Plan:   -Recommend continuing on pantoprazole twice daily.  Okay to use Pepcid as needed for breakthrough symptoms.  Would advise avoiding spicy/acidic foods/alcohol and continuing to not eat before bedtime  -She will need repeat EGD in 2029 due to history of Monsivais's  -Repeat colonoscopy due 2034  -Recommend continuing to use MiraLAX as needed for intermittent constipation      Follow Up   No follow-ups on file.    Dragon dictation used throughout this note.         Evelin Charlton PA-C  [facility]  [address] [place] [address] - [address] [place] [address]  Office: [phone]

## 2024-12-18 DIAGNOSIS — R20.0 BILATERAL HAND NUMBNESS: Primary | ICD-10-CM

## 2024-12-19 ENCOUNTER — TELEPHONE (OUTPATIENT)
Dept: ORTHOPEDIC SURGERY | Facility: CLINIC | Age: 64
End: 2024-12-19
Payer: MEDICARE

## 2024-12-20 NOTE — TELEPHONE ENCOUNTER
Patient called to clinic on 12/18/2024 for worsening of symptoms.  She was seen 7/12/2024 for same symptoms she is now ready to proceed with EMG and CT testing after she is not received any symptom improvement with conservative therapy including bracing, activity modification, NSAIDs, oral steroid.  Order placed for EMG NCT bilateral upper extremity testing for surgical planning

## 2025-02-06 RX ORDER — METHYLPREDNISOLONE 4 MG/1
TABLET ORAL
Qty: 21 TABLET | Refills: 0 | Status: SHIPPED | OUTPATIENT
Start: 2025-02-06

## 2025-02-28 ENCOUNTER — OFFICE VISIT (OUTPATIENT)
Dept: ORTHOPEDIC SURGERY | Facility: CLINIC | Age: 65
End: 2025-02-28
Payer: MEDICARE

## 2025-02-28 VITALS — BODY MASS INDEX: 20.57 KG/M2 | HEIGHT: 66 IN | WEIGHT: 128 LBS

## 2025-02-28 DIAGNOSIS — M51.16 LUMBAR DISC DISEASE WITH RADICULOPATHY: ICD-10-CM

## 2025-02-28 DIAGNOSIS — M54.50 LOW BACK PAIN, UNSPECIFIED BACK PAIN LATERALITY, UNSPECIFIED CHRONICITY, UNSPECIFIED WHETHER SCIATICA PRESENT: ICD-10-CM

## 2025-02-28 DIAGNOSIS — M70.62 TROCHANTERIC BURSITIS OF LEFT HIP: Primary | ICD-10-CM

## 2025-02-28 RX ORDER — LIDOCAINE HYDROCHLORIDE 10 MG/ML
4 INJECTION, SOLUTION EPIDURAL; INFILTRATION; INTRACAUDAL; PERINEURAL
Status: COMPLETED | OUTPATIENT
Start: 2025-02-28 | End: 2025-02-28

## 2025-02-28 RX ORDER — TRIAMCINOLONE ACETONIDE 40 MG/ML
80 INJECTION, SUSPENSION INTRA-ARTICULAR; INTRAMUSCULAR
Status: COMPLETED | OUTPATIENT
Start: 2025-02-28 | End: 2025-02-28

## 2025-02-28 RX ORDER — CYCLOBENZAPRINE HCL 10 MG
10 TABLET ORAL NIGHTLY PRN
Qty: 45 TABLET | Refills: 0 | Status: SHIPPED | OUTPATIENT
Start: 2025-02-28

## 2025-02-28 RX ADMIN — LIDOCAINE HYDROCHLORIDE 4 ML: 10 INJECTION, SOLUTION EPIDURAL; INFILTRATION; INTRACAUDAL; PERINEURAL at 14:38

## 2025-02-28 RX ADMIN — TRIAMCINOLONE ACETONIDE 80 MG: 40 INJECTION, SUSPENSION INTRA-ARTICULAR; INTRAMUSCULAR at 14:38

## 2025-02-28 NOTE — PROGRESS NOTES
Subjective:     Patient ID: Zoila Delgado is a 65 y.o. female.    Chief Complaint:  Follow-up DJD lumbar spine with radicular symptoms  History of Present Illness  History of Present Illness  The patient is a 65-year-old female who presents to the clinic today for reevaluation of her lumbar spine.    She reports a consistent, daily presence of pain in her lower back, a change from the previous intermittent nature of the discomfort. The pain radiates to the posterolateral aspect of her left hip and extends downwards. Additionally, she experiences constant paresthesia in both feet, a symptom that has persisted for several years. She also reports pain in the lateral aspect of her hip. She reports no other health concerns at present. She has been managing her symptoms with a heating pad applied to her lumbar spine at night and has taken prednisone taper on several occasions. She has tried ibuprofen, Mobic, oral steroids, and muscle relaxers, all of which have provided some relief. A recent total knee arthroplasty appeared to alleviate some of the hip pain.    MEDICATIONS  Current: ibuprofen, Mobic, prednisone, Flexeril       Social History     Occupational History    Occupation: RETIRED    Tobacco Use    Smoking status: Never     Passive exposure: Past (PARENTS SMOKED IN THE HOME.)    Smokeless tobacco: Never   Vaping Use    Vaping status: Never Used   Substance and Sexual Activity    Alcohol use: No    Drug use: No    Sexual activity: Yes     Partners: Female     Birth control/protection: Post-menopausal, Partner of same sex      Past Medical History:   Diagnosis Date    Ankle sprain     Arthritis     Arthritis of back Jan 22    Dr. Owen    Monsivais esophagus     Cervical disc disorder     Diverticulitis     Diverticulitis of colon     Dyspepsia     Gastritis     GERD (gastroesophageal reflux disease)     HH (hiatus hernia)     Hip arthrosis July 27 2022    Dr Yee did Mri    Hyperlipidemia     Knee swelling  "Several years ago    Dr. Yee    Low back pain     Lumbar disc disease with radiculopathy 01/31/2023    Palpitations     Perirectal abscess     PONV (postoperative nausea and vomiting)     after ablation    Renal cyst     left    Rotator cuff syndrome     Severe motion sickness     Shoulder injury     Trochanteric bursitis of left hip 01/31/2023     Past Surgical History:   Procedure Laterality Date    COLONOSCOPY  11/30/2015    tics,nibh    ENDOMETRIAL ABLATION      x2    ENDOSCOPY  11/30/2015    HH, Z-line irregular, 42 cm. fromincisors, chronic inflammation    ENDOSCOPY N/A 05/07/2019    Monsivais's, HH    KNEE SURGERY      TONSILLECTOMY  1968    TOTAL KNEE ARTHROPLASTY Right 03/12/2024    Procedure: TOTAL KNEE ARTHROPLASTY AND ALL ASSOCIATED PROCEDURES;  Surgeon: Mariano Yee MD;  Location: [facility];  Service: Orthopedics;  Laterality: Right;    UPPER GASTROINTESTINAL ENDOSCOPY         Family History   Problem Relation Age of Onset    Hypertension Mother     Cancer Mother     Hypertension Father     Breast cancer Sister     Cancer Sister     Stroke Maternal Grandmother     Malig Hyperthermia Neg Hx                Objective:  Physical Exam    General: No acute distress.  Eyes: conjunctiva clear; pupils equally round and reactive  ENT: external ears and nose atraumatic; oropharynx clear  CV: no peripheral edema  Resp: normal respiratory effort  Skin: no rashes or wounds; normal turgor  Psych: mood and affect appropriate; recent and remote memory intact    Vitals:    02/28/25 1346   Weight: 58.1 kg (128 lb)   Height: 167.6 cm (66\")         02/28/25  1346   Weight: 58.1 kg (128 lb)     Body mass index is 20.66 kg/m².      Ortho Exam     Physical Exam    Left lower extremity examined  Negative logroll exam  Negative Stinchfield exam  Maximal tenderness present along the greater trochanter  Positive straight leg raise  Paresthesia into all digits of the feet bilaterally  Positive tenderness to palpate along " the posterior aspect of the hip gluteal muscles  Positive straight leg raise 60 degrees  Imaging:  Lumbar Spine X-Ray  Indication: Pain  AP and Lateral views    Findings:  No fracture  No bony lesion  Normal soft tissues  Multilevel degeneration throughout the lumbar spine, facet arthropathy, thoracolumbar scoliosis as previously noted on prior x-ray imaging  Prior studies were available for comparison.    Assessment:        1. Low back pain, unspecified back pain laterality, unspecified chronicity, unspecified whether sciatica present         Assessment & Plan  1. Lumbar spine reevaluation.  A comprehensive discussion was held regarding potential treatment strategies. Radiographic images were reviewed. A repeat MRI will be conducted to assess any progression in her lumbar spine condition. The results will be communicated, and the subsequent care plan will be determined. She is advised to continue applying heat to the lumbar spine. The use of Flexeril was discussed, and it will be initiated. As she does not experience gastrointestinal discomfort with anti-inflammatory medications, their use will be deferred for now. However, she may take ibuprofen as needed. All her queries were addressed.    2. Greater trochanteric bursitis.  A corticosteroid injection was proposed as a potential treatment for the greater trochanteric bursitis to evaluate if it provides significant relief for the lateral hip pain. She is advised to apply ice to the injection site this evening.    Plan:    Large Joint Arthrocentesis: L greater trochanteric bursa  Date/Time: 2/28/2025 2:38 PM  Consent given by: patient  Site marked: site marked  Timeout: Immediately prior to procedure a time out was called to verify the correct patient, procedure, equipment, support staff and site/side marked as required   Supporting Documentation  Indications: pain   Procedure Details  Location: hip - L greater trochanteric bursa  Preparation: Patient was prepped  and draped in the usual sterile fashion  Needle size: 22 G  Approach: lateral  Medications administered: 4 mL lidocaine PF 1% 1 %; 80 mg triamcinolone acetonide 40 MG/ML  Patient tolerance: patient tolerated the procedure well with no immediate complications        Orders:  Orders Placed This Encounter   Procedures    Large Joint Arthrocentesis: L greater trochanteric bursa    XR Spine Lumbar 2 or 3 View     New Medications Ordered This Visit   Medications    cyclobenzaprine (FLEXERIL) 10 MG tablet     Sig: Take 1 tablet by mouth At Night As Needed for Muscle Spasms.     Dispense:  45 tablet     Refill:  0           Dragon dictation utilized  BMI is within normal parameters. No other follow-up for BMI required.       Patient or patient representative verbalized consent for the use of Ambient Listening during the visit with  SYD Landis for chart documentation. 2/28/2025  16:23 EST   left knee replacement

## 2025-03-13 ENCOUNTER — OFFICE VISIT (OUTPATIENT)
Dept: ORTHOPEDIC SURGERY | Facility: CLINIC | Age: 65
End: 2025-03-13
Payer: MEDICARE

## 2025-03-13 VITALS — HEIGHT: 66 IN | BODY MASS INDEX: 20.57 KG/M2 | WEIGHT: 128 LBS

## 2025-03-13 DIAGNOSIS — Z96.651 STATUS POST TOTAL RIGHT KNEE REPLACEMENT: Primary | ICD-10-CM

## 2025-03-13 NOTE — PROGRESS NOTES
Subjective:     Patient ID: Zoila Delgado is a 65 y.o. female.    Chief Complaint:  Follow-up status post right total knee arthroplasty-3/12/2024     History of Present Illness  History of Present Illness  Gabrielle returns to clinic today, 1 year out from her right total knee arthroplasty.  Doing very well at this point in time.  Mild intermittent clicking over her patellofemoral joint.  Mild intermittent swelling with increased levels of activity but significantly improved compared to preoperative levels and continued to notice improvement over the last 4 to 6 months.  No issues with incision, no fever chills or sweats, no numbness or tingling.     Social History     Occupational History    Occupation: RETIRED    Tobacco Use    Smoking status: Never     Passive exposure: Past (PARENTS SMOKED IN THE HOME.)    Smokeless tobacco: Never   Vaping Use    Vaping status: Never Used   Substance and Sexual Activity    Alcohol use: No    Drug use: No    Sexual activity: Yes     Partners: Female     Birth control/protection: Post-menopausal, Partner of same sex      Past Medical History:   Diagnosis Date    Ankle sprain     Arthritis     Arthritis of back Jan 22    Dr. Owen    Monsivais esophagus     Cervical disc disorder     Diverticulitis     Diverticulitis of colon     Dyspepsia     Gastritis     GERD (gastroesophageal reflux disease)     HH (hiatus hernia)     Hip arthrosis July 27 2022    Dr Yee did Mri    Hyperlipidemia     Knee swelling Several years ago    Dr. Yee    Low back pain     Lumbar disc disease with radiculopathy 01/31/2023    Palpitations     Perirectal abscess     PONV (postoperative nausea and vomiting)     after ablation    Renal cyst     left    Rotator cuff syndrome     Severe motion sickness     Shoulder injury     Trochanteric bursitis of left hip 01/31/2023     Past Surgical History:   Procedure Laterality Date    COLONOSCOPY  11/30/2015    tics,nibh    ENDOMETRIAL ABLATION      x2    ENDOSCOPY  " 11/30/2015    HH, Z-line irregular, 42 cm. fromincisors, chronic inflammation    ENDOSCOPY N/A 05/07/2019    Monsivais's, HH    KNEE SURGERY      TONSILLECTOMY  1968    TOTAL KNEE ARTHROPLASTY Right 03/12/2024    Procedure: TOTAL KNEE ARTHROPLASTY AND ALL ASSOCIATED PROCEDURES;  Surgeon: Mariano Yee MD;  Location: [facility];  Service: Orthopedics;  Laterality: Right;    UPPER GASTROINTESTINAL ENDOSCOPY         Family History   Problem Relation Age of Onset    Hypertension Mother     Cancer Mother     Hypertension Father     Breast cancer Sister     Cancer Sister     Stroke Maternal Grandmother     Malig Hyperthermia Neg Hx          Review of Systems        Objective:  Vitals:    03/13/25 0834   Weight: 58.1 kg (128 lb)   Height: 167.6 cm (66\")         03/13/25  0834   Weight: 58.1 kg (128 lb)     Body mass index is 20.66 kg/m².  General: No acute distress.  Resp: normal respiratory effort  Skin: no rashes or wounds; normal turgor  Psych: mood and affect appropriate; recent and remote memory intact          Physical Exam         Right knee-midline incision well-healed, active motion 0 to 140 degrees, 5 out of 5 strength in flexion and extension, midline patella tracking, no inverted J sign.  Minimal residual effusion superior lateral.  Stable to varus valgus stress 0 and 30 degrees.  Good endpoint on posterior drawer 90 degrees.  Imaging:  Right Knee X-Ray  Indication: s/p total knee     AP, Lateral, and [place] views    Findings:  Total knee arthroplasty components are in stable position with acceptable overall alignment, no evidence of periprosthetic fracture, loosening, osteolysis, or reactive bone formation.    Compared to prior postoperative x-rays    Assessment:        1. Status post total right knee replacement           Plan:          Assessment & Plan  Gabrielle is doing very well at this point in time, continue with work on motion strength and return to function as tolerated.  Follow-up 1 year repeat " x-rays of right knee or sooner if needed    Zoila Delgado  was in agreement with plan and had all questions answered.     Orders:  Orders Placed This Encounter   Procedures    XR Knee 3+ View With [place] Right       Medications:  No orders of the defined types were placed in this encounter.      Followup:  No follow-ups on file.    Diagnoses and all orders for this visit:    1. Status post total right knee replacement (Primary)  -     XR Knee 3+ View With [place] Right          BMI is within normal parameters. No other follow-up for BMI required.       Dictated utilizing Dragon dictation     Patient or patient representative verbalized consent for the use of Ambient Listening during the visit with  Mariano Yee MD for chart documentation. 3/13/2025  08:45 EDT

## 2025-04-10 DIAGNOSIS — M43.16 ANTEROLISTHESIS OF LUMBAR SPINE: ICD-10-CM

## 2025-04-10 DIAGNOSIS — M51.360 DEGENERATION OF INTERVERTEBRAL DISC OF LUMBAR REGION WITH DISCOGENIC BACK PAIN: Primary | ICD-10-CM

## 2025-04-18 NOTE — PROGRESS NOTES
EMG and Nerve Conduction Studies    I.      Instrument used: Neuromax 1002  II.     Please see data sheets for tabular summary of NCS and details on methods, temperatures and lab standards.   III.    EMG muscles tested for upper extremity studies include the deltoid, biceps, triceps, pronator teres, extensor digitorum communis, first dorsal interosseous and abductor pollicis brevis.    IV.   EMG muscles tested for lower extremity studies include the vastus lateralis, tibialis anterior, peroneus longus, medial gastrocnemius and extensor digitorum brevis.    V.    Additional muscles tested as needed.  Paraspinal muscles tested as needed.   VI.   Please see data sheets for tabular summary of EMG findings.   VII. The complete report includes the data sheets.      Indication: Bilateral carpal tunnel syndrome  History: 65-year-old woman with longstanding history of carpal tunnel syndrome with variations of symptoms.  Last December she was doing a lot of painting and had a really bad flareup.  She wears a wrist splint on each wrist at night.  Symptoms have gotten significantly better since then.  She denies diabetes or thyroid disease      Ht: 66 inches  Wt: 128 pounds  HbA1C:   Lab Results   Component Value Date    HGBA1C 5.40 02/27/2024     TSH:   Lab Results   Component Value Date    TSH 2.870 11/13/2019       Technical summary:  Nerve conduction studies were obtained in both arms.  Skin temperatures were cold.  The hands were warmed but I was unable to get the right hand fully warmed despite 2 attempts.  Temperature correction was used if indicated.  Needle examination was obtained on selected muscles in both arms.    Results:  1.  Prolonged median antidromic sensory distal latencies bilaterally at 4.1 ms on the left and 4.6 ms on the right.  2.  Normal left ulnar antidromic sensory distal latency and amplitude.  Normal right ulnar sensory latency with temperature correction and normal amplitude.  3.  Normal right  radial sensory distal latency and amplitude.  4.  Prolonged median motor conduction velocities bilaterally at 5.1 ms on the left and 5.0 ms on the right.  The conduction velocity was normal on the left but slow on the right at 45.6 m/s.  The amplitudes were normal bilaterally.  5.  Normal ulnar motor conduction velocities with normal distal latencies and amplitudes bilaterally.  6.  Needle examination of selected muscles in both arms showed normal insertional activities throughout with normal motor units and recruitment patterns throughout.    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  The right median motor conduction velocity was slow in the forearm but there were no needle exam changes in the flexor pollicis longus or pronator teres to suggest proximal median nerve involvement.  This finding is likely due to retrograde demyelination.  There was no evidence of an ulnar neuropathy or cervical radiculopathy on either side by this study.  Study results were discussed with the patient.    Mario Bull M.D.              Dictated utilizing Dragon dictation.

## 2025-04-22 ENCOUNTER — PROCEDURE VISIT (OUTPATIENT)
Dept: NEUROLOGY | Facility: CLINIC | Age: 65
End: 2025-04-22
Payer: MEDICARE

## 2025-04-22 DIAGNOSIS — R20.0 BILATERAL HAND NUMBNESS: ICD-10-CM

## 2025-04-22 DIAGNOSIS — G56.03 BILATERAL CARPAL TUNNEL SYNDROME: Primary | ICD-10-CM

## 2025-05-07 NOTE — PROGRESS NOTES
"Chief Complaint  Heartburn    Subjective          History of Present Illness    Zoila Delgado is a  65 y.o. female presents for follow-up.  She has a history of abdominal pain and Monsivais's esophagus.  She is a patient of Dr. Arias and was last seen by me in the office 11/22/2024.    She has previously been maintained on pantoprazole 40 mg twice daily.  This has controlled her reflux in the past.  She recently contacted the office stating that despite taking pantoprazole twice daily she has continued to have significant burning in her throat.     Today she reports worsening reflux since January and she has been requiring Pepcid daily in addition to pantoprazole but she is still having significant breakthrough symptoms. Symptoms worse in the afternoon. No trouble swallowing or painful swallowing. Regular bowel movements w/ miralax.  Good appetite and weight has been stable.    Constipation is maintained on MiraLAX as needed.    2/15/2024 EGD showed salmon-colored mucosa which was biopsied, gastritis, normal examined duodenum.  Gastric biopsies were normal.  No Monsivais's changes seen on biopsy, but this does not mean that she does not have Monsivais's changes that have been seen previously.  Recommend repeat EGD in 5 years to follow-up on Monsivais's.     2/15/2024 colonoscopy showed diverticulosis, nonbleeding internal hemorrhoids and otherwise normal exam.  Repeat colonoscopy 10 years.    Objective   Vital Signs:   /79 (BP Location: Left arm, Patient Position: Sitting, Cuff Size: Adult)   Pulse 81   Temp 97.5 °F (36.4 °C)   Ht 167.6 cm (66\")   Wt 57.9 kg (127 lb 11.2 oz)   BMI 20.61 kg/m²       Physical Exam  Constitutional:       General: She is not in acute distress.     Appearance: Normal appearance.   Eyes:      General: No scleral icterus.  Pulmonary:      Effort: Pulmonary effort is normal.   Abdominal:      General: Abdomen is flat. There is no distension.      Tenderness: There is no abdominal " tenderness. There is no guarding.   Skin:     Coloration: Skin is not jaundiced.   Neurological:      General: No focal deficit present.      Mental Status: She is alert and oriented to person, place, and time.   Psychiatric:         Mood and Affect: Mood normal.         Behavior: Behavior normal.          Result Review :   The following data was reviewed by: Evelin Charlton PA-C on 05/08/2025:              Assessment:   Diagnoses and all orders for this visit:    1. Monsivais's esophagus without dysplasia (Primary)  -     lansoprazole (PREVACID) 30 MG capsule; Take 1 capsule by mouth Daily.  Dispense: 90 capsule; Refill: 3  -     Case Request; Standing  -     Case Request    2. Gastroesophageal reflux disease, unspecified whether esophagitis present  -     lansoprazole (PREVACID) 30 MG capsule; Take 1 capsule by mouth Daily.  Dispense: 90 capsule; Refill: 3  -     Case Request; Standing  -     Case Request    Other orders  -     Implement Anesthesia orders day of procedure.; Standing  -     Follow Anesthesia Guidelines / Protocol; Future          Plan:   - She is having significant breakthrough symptoms despite pantoprazole and Pepcid.  Recommend EGD for further evaluation especially given her history of Monsivais's esophagus.  In the meantime I recommend she switch to lansoprazole twice daily.  We discussed that she can still use Pepcid as needed for breakthrough symptoms and to keep her reflux under control.  Recommend she avoid spicy/acidic foods, caffeine, alcohol in the meantime and avoid eating close to bedtime.      Follow Up   Return for EGD.    Dragon dictation used throughout this note.         Evelin Charlton PA-C  [facility]  [address] [place] [address]  Office: [phone]

## 2025-05-08 ENCOUNTER — TELEPHONE (OUTPATIENT)
Dept: GASTROENTEROLOGY | Facility: CLINIC | Age: 65
End: 2025-05-08
Payer: MEDICARE

## 2025-05-08 ENCOUNTER — OFFICE VISIT (OUTPATIENT)
Dept: GASTROENTEROLOGY | Facility: CLINIC | Age: 65
End: 2025-05-08
Payer: MEDICARE

## 2025-05-08 VITALS
HEIGHT: 66 IN | SYSTOLIC BLOOD PRESSURE: 121 MMHG | WEIGHT: 127.7 LBS | DIASTOLIC BLOOD PRESSURE: 79 MMHG | HEART RATE: 81 BPM | BODY MASS INDEX: 20.52 KG/M2 | TEMPERATURE: 97.5 F

## 2025-05-08 DIAGNOSIS — K22.70 BARRETT'S ESOPHAGUS WITHOUT DYSPLASIA: Primary | ICD-10-CM

## 2025-05-08 DIAGNOSIS — K21.9 GASTROESOPHAGEAL REFLUX DISEASE, UNSPECIFIED WHETHER ESOPHAGITIS PRESENT: ICD-10-CM

## 2025-05-08 PROCEDURE — 99214 OFFICE O/P EST MOD 30 MIN: CPT

## 2025-05-08 PROCEDURE — 1160F RVW MEDS BY RX/DR IN RCRD: CPT

## 2025-05-08 PROCEDURE — 1159F MED LIST DOCD IN RCRD: CPT

## 2025-05-08 RX ORDER — LANSOPRAZOLE 30 MG/1
30 CAPSULE, DELAYED RELEASE ORAL DAILY
Qty: 90 CAPSULE | Refills: 3 | Status: SHIPPED | OUTPATIENT
Start: 2025-05-08

## 2025-05-08 NOTE — TELEPHONE ENCOUNTER
Advised that [facility] will call with final arrival time minimum 24 hrs before procedure. IF they do not get a phone call, arrival time will stay the same as given on instructions OK FOR THE HUB TO RELAY   Egd   05/29

## 2025-05-14 NOTE — PROGRESS NOTES
Patient ID: Zoila Delgado is a 65 y.o. female is being seen for consultation today at the request of SYD Dalal for degeneration of intervertebral disc of lumbar region with discogenic back pain.    Imaging: MRI of the lumbar spine completed on 03/19/2025    Subjective     The patient is here in regards to   Chief Complaint   Patient presents with    Back Pain       History of Present Illness  Gabrielle is in excellent condition as very active.  She had a right-sided knee replacement and had stopped running as result of knee pain but she continues to walk and still continues to exercise with a .  Occasionally she has some tightness in the low back and she does some stretching for that and it tends to get better over time.  She also has some neck stiffness and left-sided neck pain that she attributes to a recent case of shingles, but that is improving.  She has occasional left-sided sciatica in her buttocks but does not travel down her leg.  She has bilateral numbness in her feet and has bilateral carpal tunnel syndrome.      While in the room and during my examination of the patient I wore a mask and eye protection.  I washed my hands before and after this patient encounter.  The patient was also wearing a mask.    The following portions of the patient's history were reviewed and updated as appropriate: allergies, current medications, past family history, past medical history, past social history, past surgical history and problem list.    Review of Systems   Constitutional:  Negative for fever.   HENT:  Positive for congestion and tinnitus.    Eyes:  Negative for visual disturbance.   Respiratory:  Negative for chest tightness and shortness of breath.    Cardiovascular:  Negative for chest pain.   Gastrointestinal:  Negative for nausea and vomiting.   Endocrine: Negative for cold intolerance and heat intolerance.   Genitourinary:  Negative for difficulty urinating.   Musculoskeletal:  Positive  for back pain, neck pain and neck stiffness. Negative for gait problem.   Skin:  Negative for rash.   Allergic/Immunologic: Negative for food allergies.   Neurological:  Positive for numbness and headaches. Negative for dizziness, weakness and light-headedness.   Hematological:  Does not bruise/bleed easily.   Psychiatric/Behavioral:  Negative for confusion and decreased concentration.         Past Medical History:   Diagnosis Date    Ankle sprain     Arthritis     Arthritis of back Jan 22    Dr. Owen    Monsivais esophagus     Cervical disc disorder     Diverticulitis     Diverticulitis of colon     Dyspepsia     Gastritis     GERD (gastroesophageal reflux disease)     HH (hiatus hernia)     Hip arthrosis July 27 2022    Dr Yee did Mri    Hyperlipidemia 2018    Knee swelling Several years ago    Dr. Yee    Low back pain     Lumbar disc disease with radiculopathy 01/31/2023    Palpitations     Perirectal abscess     PONV (postoperative nausea and vomiting)     after ablation    Renal cyst     left    Rotator cuff syndrome     Severe motion sickness     Shoulder injury     Trochanteric bursitis of left hip 01/31/2023       No Known Allergies    Family History   Problem Relation Age of Onset    Hypertension Mother     Cancer Mother     Hypertension Father     Breast cancer Sister     Cancer Sister     Stroke Maternal Grandmother     Malig Hyperthermia Neg Hx        Social History     Socioeconomic History    Marital status: Single   Tobacco Use    Smoking status: Never     Passive exposure: Past (PARENTS SMOKED IN THE HOME.)    Smokeless tobacco: Never   Vaping Use    Vaping status: Never Used   Substance and Sexual Activity    Alcohol use: No    Drug use: No    Sexual activity: Yes     Partners: Female     Birth control/protection: Post-menopausal, Partner of same sex       Past Surgical History:   Procedure Laterality Date    COLONOSCOPY  11/30/2015    balbina mallory    ENDOMETRIAL ABLATION      x2    ENDOSCOPY   11/30/2015    HH, Z-line irregular, 42 cm. fromincisors, chronic inflammation    ENDOSCOPY N/A 05/07/2019    Monsivais's, [facility]    KNEE SURGERY      TONSILLECTOMY  1968    TOTAL KNEE ARTHROPLASTY Right 03/12/2024    Procedure: TOTAL KNEE ARTHROPLASTY AND ALL ASSOCIATED PROCEDURES;  Surgeon: Mariano Yee MD;  Location: [facility];  Service: Orthopedics;  Laterality: Right;    UPPER GASTROINTESTINAL ENDOSCOPY           Objective     Vitals:    05/15/25 0824   BP: 122/68   Pulse: 71   Resp: 16   Temp: 97 °F (36.1 °C)   SpO2: 95%     Body mass index is 20.69 kg/m².    Physical Exam  Constitutional:       General: She is awake.   HENT:      Head: Normocephalic and atraumatic.   Eyes:      General: Lids are normal.      Extraocular Movements: Extraocular movements intact.      Conjunctiva/sclera: Conjunctivae normal.      Pupils: Pupils are equal, round, and reactive to light.   Pulmonary:      Breath sounds: Normal breath sounds.   Abdominal:      Palpations: Abdomen is soft.   Musculoskeletal:         General: Normal range of motion.   Skin:     General: Skin is warm and dry.   Neurological:      Mental Status: She is alert.      Coordination: Coordination is intact.   Psychiatric:         Behavior: Behavior is cooperative.         Neurological Exam  Mental Status  Awake and alert. Oriented to person, place and time.    Cranial Nerves  CN II: Visual acuity is normal. Visual fields full to confrontation.  CN III, IV, VI: Extraocular movements intact bilaterally. Normal lids and orbits bilaterally. Pupils equal round and reactive to light bilaterally.  CN V: Facial sensation is normal.  CN VII: Full and symmetric facial movement.  CN IX, X: Palate elevates symmetrically. Normal gag reflex.  CN XI: Shoulder shrug strength is normal.  CN XII: Tongue midline without atrophy or fasciculations.    Motor                                               Right                     Left  Deltoid                                   5                           5   Biceps                                   5                          5   Triceps                                  5                          5   Iliopsoas                               5                          5   Quadriceps                           5                          5   Hamstring                             5                          5   Gastrocnemius                     5                           5   Anterior tibialis                      5                          5    Sensory  Sensation is intact to light touch, pinprick, vibration and proprioception in all four extremities.    Coordination    Finger-to-nose, rapid alternating movements and heel-to-shin normal bilaterally without dysmetria.    Gait  Normal casual, toe, heel and tandem gait.      Assessment & Plan   Independent Review of Radiographic Studies:      I personally reviewed the images from the following studies.    FINDINGS:    MR: MRI of the lumbar spine wo contrast was reviewed and shows L4-5 anterolisthesis with advanced degenerative facet arthropathy.  There is also moderate degenerative facet arthropathy at L3-4 and L5-S1 and mild degenerative facet arthropathy at L2-3.  No obvious foraminal stenosis bilaterally aside from L4-5 with mild foraminal narrowing on the right worse than the left but no obvious compression of the exiting nerve root at that level.    Assessment/Plan: Has some degenerative disc disease in the lumbar spine and facet arthropathy especially at L4-5.  In the future, I think that she could benefit from medial branch blocks or radiofrequency ablation.  Additionally, I recommended that she use gabapentin provided by her PCP if her bilateral lower extremity peripheral neuropathy worsens but I do not think it is related to her spine.  Overall she is in excellent shape and has minimal symptoms from her spine disease.    Medical Decision Making:      Follow-up as needed         Diagnoses and all  orders for this visit:    1. Anterolisthesis of lumbar spine (Primary)             Patient Instructions/Recommendations:    Call with any questions or concerns      Dexter Kramer MD  05/15/25  08:53 EDT

## 2025-05-15 ENCOUNTER — OFFICE VISIT (OUTPATIENT)
Dept: NEUROSURGERY | Facility: CLINIC | Age: 65
End: 2025-05-15
Payer: MEDICARE

## 2025-05-15 VITALS
SYSTOLIC BLOOD PRESSURE: 122 MMHG | OXYGEN SATURATION: 95 % | WEIGHT: 128.2 LBS | RESPIRATION RATE: 16 BRPM | HEIGHT: 66 IN | HEART RATE: 71 BPM | TEMPERATURE: 97 F | DIASTOLIC BLOOD PRESSURE: 68 MMHG | BODY MASS INDEX: 20.6 KG/M2

## 2025-05-15 DIAGNOSIS — M43.16 ANTEROLISTHESIS OF LUMBAR SPINE: Primary | ICD-10-CM

## 2025-05-29 ENCOUNTER — OUTSIDE FACILITY SERVICE (OUTPATIENT)
Dept: GASTROENTEROLOGY | Facility: CLINIC | Age: 65
End: 2025-05-29
Payer: MEDICARE

## 2025-05-29 ENCOUNTER — LAB REQUISITION (OUTPATIENT)
Dept: LAB | Facility: HOSPITAL | Age: 65
End: 2025-05-29
Payer: MEDICARE

## 2025-05-29 DIAGNOSIS — K22.89 OTHER SPECIFIED DISEASE OF ESOPHAGUS: ICD-10-CM

## 2025-05-29 DIAGNOSIS — R12 HEARTBURN: ICD-10-CM

## 2025-05-29 DIAGNOSIS — K21.9 GASTRO-ESOPHAGEAL REFLUX DISEASE WITHOUT ESOPHAGITIS: ICD-10-CM

## 2025-05-29 PROCEDURE — 88305 TISSUE EXAM BY PATHOLOGIST: CPT | Performed by: INTERNAL MEDICINE

## 2025-05-29 PROCEDURE — 43239 EGD BIOPSY SINGLE/MULTIPLE: CPT | Performed by: INTERNAL MEDICINE

## 2025-05-30 LAB
CYTO UR: NORMAL
LAB AP CASE REPORT: NORMAL
LAB AP CLINICAL INFORMATION: NORMAL
PATH REPORT.FINAL DX SPEC: NORMAL
PATH REPORT.GROSS SPEC: NORMAL

## 2025-07-01 DIAGNOSIS — K21.9 GASTROESOPHAGEAL REFLUX DISEASE, UNSPECIFIED WHETHER ESOPHAGITIS PRESENT: ICD-10-CM

## 2025-07-01 DIAGNOSIS — K22.70 BARRETT'S ESOPHAGUS WITHOUT DYSPLASIA: ICD-10-CM

## 2025-07-01 RX ORDER — LANSOPRAZOLE 30 MG/1
30 CAPSULE, DELAYED RELEASE ORAL
Qty: 180 CAPSULE | Refills: 0 | Status: SHIPPED | OUTPATIENT
Start: 2025-07-01

## (undated) DEVICE — KT CATH NERV BLCK ONQ QUIKBLCK 20GA 100MM

## (undated) DEVICE — KT MIX CMT PALAMIX/UNO VAC WO/CMT

## (undated) DEVICE — SOL IRR H2O BTL 1000ML STRL

## (undated) DEVICE — DRP SURG U/DRP INVISISHIELD PA 48X52IN

## (undated) DEVICE — FRAZIER SURGICAL SUCTION INSTRUMENT: Brand: ARGYLE

## (undated) DEVICE — IMMOB KN 3PNL DLX CANVS 19IN BLU

## (undated) DEVICE — GLV SURG SENSICARE PF POLYISPRN SZ8 LF

## (undated) DEVICE — DUAL CUT SAGITTAL BLADE

## (undated) DEVICE — SUT VIC 0 CT1 36IN J946H

## (undated) DEVICE — Device

## (undated) DEVICE — INTENDED USE FOR SURGICAL MARKING ON INTACT SKIN, ALSO PROVIDES A PERMANENT METHOD OF IDENTIFYING OBJECTS IN THE OPERATING ROOM: Brand: WRITESITE® REGULAR TIP SKIN MARKER

## (undated) DEVICE — CAP GUIDE PSI/SIGNATURE/I-ASSIST

## (undated) DEVICE — SPNG GZ WOVN 4X4IN 12PLY 10/BX STRL

## (undated) DEVICE — PUMP PAIN AUTOFUSER AUTO SELCT NOBOLUS 1TO14ML/HR 550ML DISP

## (undated) DEVICE — SOL ISO/ALC RUB 70PCT 4OZ

## (undated) DEVICE — Device: Brand: DEFENDO AIR/WATER/SUCTION AND BIOPSY VALVE

## (undated) DEVICE — YANKAUER,BULB TIP,W/O VENT,RIGID,STERILE: Brand: MEDLINE

## (undated) DEVICE — SUT VIC 2/0 CT1 CR8 18IN J839D

## (undated) DEVICE — PREP SOL POVIDONE/IODINE BT 4OZ

## (undated) DEVICE — TBG PENCL TELESCP MEGADYNE SMOKE EVAC 10FT

## (undated) DEVICE — MAT FLR ABSORBENT LG 4FT 10 2.5FT

## (undated) DEVICE — GLV SURG SENSICARE PI LF PF 7.0

## (undated) DEVICE — WRAP KNEE COLD THERAPY

## (undated) DEVICE — SCRW HEX PERSONA FML 2.5X25MM PK/2
Type: IMPLANTABLE DEVICE | Site: KNEE | Status: NON-FUNCTIONAL
Removed: 2024-03-12

## (undated) DEVICE — 2108 SERIES SAGITTAL BLADE AGGRESSIVE  (12.5 X 1.19 X 81.5MM)

## (undated) DEVICE — WEBRIL* CAST PADDING: Brand: DEROYAL

## (undated) DEVICE — FOAM BUMP ROUND LARGE: Brand: MEDLINE INDUSTRIES, INC.

## (undated) DEVICE — APPL CHLORAPREP HI/LITE 26ML ORNG

## (undated) DEVICE — GLV SURG SENSICARE PI PF LF 7 GRN STRL

## (undated) DEVICE — FRCP BX RADJAW4 NDL 2.8 240CM LG OG BX40

## (undated) DEVICE — TUBING, SUCTION, 1/4" X 10', STRAIGHT: Brand: MEDLINE

## (undated) DEVICE — SCRW HEX CORT HD 3.5X38MM
Type: IMPLANTABLE DEVICE | Site: KNEE | Status: NON-FUNCTIONAL
Removed: 2024-03-12

## (undated) DEVICE — INTENDED FOR TISSUE SEPARATION, AND OTHER PROCEDURES THAT REQUIRE A SHARP SURGICAL BLADE TO PUNCTURE OR CUT.: Brand: BARD-PARKER ® STAINLESS STEEL BLADES

## (undated) DEVICE — BITEBLOCK OMNI BLOC

## (undated) DEVICE — DISPOSABLE TOURNIQUET CUFF 34"X4", 1-LINE, BLUE, STERILE, 1EA/PK, 10PK/CS: Brand: ASP MEDICAL

## (undated) DEVICE — PAD,NON-ADHERENT,3X8,STERILE,LF,1/PK: Brand: MEDLINE

## (undated) DEVICE — PK KN TOTL 90

## (undated) DEVICE — CANN NASL CO2 TRULINK W/O2 A/

## (undated) DEVICE — SYS CLS SKIN PREMIERPRO EXOFINFUSION 22CM